# Patient Record
Sex: FEMALE | Race: WHITE | NOT HISPANIC OR LATINO | Employment: OTHER | URBAN - METROPOLITAN AREA
[De-identification: names, ages, dates, MRNs, and addresses within clinical notes are randomized per-mention and may not be internally consistent; named-entity substitution may affect disease eponyms.]

---

## 2023-02-20 LAB
INR IN PPP BY COAGULATION ASSAY: 2.5 (ref 0.9–1.1)
PROTHROMBIN TIME (PT) IN PPP BY COAGULATION ASSAY: 29.7 SEC (ref 9.8–13.4)

## 2023-03-10 LAB
ANION GAP IN SER/PLAS: 8 MMOL/L (ref 10–20)
CALCIUM (MG/DL) IN SER/PLAS: 9.4 MG/DL (ref 8.6–10.3)
CARBON DIOXIDE, TOTAL (MMOL/L) IN SER/PLAS: 29 MMOL/L (ref 21–32)
CHLORIDE (MMOL/L) IN SER/PLAS: 103 MMOL/L (ref 98–107)
CREATININE (MG/DL) IN SER/PLAS: 0.59 MG/DL (ref 0.5–1.05)
GFR FEMALE: >90 ML/MIN/1.73M2
GLUCOSE (MG/DL) IN SER/PLAS: 93 MG/DL (ref 74–99)
INR IN PPP BY COAGULATION ASSAY: 3.2 (ref 0.9–1.1)
POTASSIUM (MMOL/L) IN SER/PLAS: 4.8 MMOL/L (ref 3.5–5.3)
PROTHROMBIN TIME (PT) IN PPP BY COAGULATION ASSAY: 37.5 SEC (ref 9.8–13.4)
SODIUM (MMOL/L) IN SER/PLAS: 135 MMOL/L (ref 136–145)
UREA NITROGEN (MG/DL) IN SER/PLAS: 16 MG/DL (ref 6–23)

## 2023-03-24 LAB
INR IN PPP BY COAGULATION ASSAY: 2.6 (ref 0.9–1.1)
PROTHROMBIN TIME (PT) IN PPP BY COAGULATION ASSAY: 30.4 SEC (ref 9.8–13.4)

## 2023-04-11 RX ORDER — LORAZEPAM 1 MG/1
1 TABLET ORAL 2 TIMES DAILY PRN
COMMUNITY
Start: 2016-12-21 | End: 2023-04-12 | Stop reason: SDUPTHER

## 2023-04-11 RX ORDER — AMLODIPINE BESYLATE 5 MG/1
1 TABLET ORAL DAILY
COMMUNITY
Start: 2021-08-12 | End: 2024-01-29

## 2023-04-11 RX ORDER — CHOLECALCIFEROL (VITAMIN D3) 25 MCG
1 TABLET ORAL DAILY
COMMUNITY
Start: 2019-01-07

## 2023-04-11 RX ORDER — ROSUVASTATIN CALCIUM 40 MG/1
1 TABLET, COATED ORAL DAILY
COMMUNITY
Start: 2020-03-06 | End: 2024-03-05 | Stop reason: SDUPTHER

## 2023-04-11 RX ORDER — METOPROLOL TARTRATE 50 MG/1
50 TABLET ORAL 2 TIMES DAILY
COMMUNITY
End: 2024-03-05 | Stop reason: SDUPTHER

## 2023-04-11 RX ORDER — LISINOPRIL 20 MG/1
20 TABLET ORAL 2 TIMES DAILY
COMMUNITY
End: 2024-03-05 | Stop reason: SDUPTHER

## 2023-04-11 RX ORDER — WARFARIN SODIUM 5 MG/1
TABLET ORAL
COMMUNITY
Start: 2019-01-09 | End: 2024-01-10 | Stop reason: SDUPTHER

## 2023-04-12 ENCOUNTER — OFFICE VISIT (OUTPATIENT)
Dept: PRIMARY CARE | Facility: CLINIC | Age: 73
End: 2023-04-12
Payer: MEDICARE

## 2023-04-12 VITALS
HEIGHT: 59 IN | SYSTOLIC BLOOD PRESSURE: 128 MMHG | DIASTOLIC BLOOD PRESSURE: 66 MMHG | HEART RATE: 74 BPM | WEIGHT: 157.8 LBS | TEMPERATURE: 97.3 F | OXYGEN SATURATION: 96 % | BODY MASS INDEX: 31.81 KG/M2

## 2023-04-12 DIAGNOSIS — Z95.1 S/P CABG X 4: ICD-10-CM

## 2023-04-12 DIAGNOSIS — G47.33 OSA ON CPAP: ICD-10-CM

## 2023-04-12 DIAGNOSIS — F41.9 ANXIETY: Primary | ICD-10-CM

## 2023-04-12 DIAGNOSIS — I10 BENIGN ESSENTIAL HYPERTENSION: ICD-10-CM

## 2023-04-12 DIAGNOSIS — Z79.899 LONG-TERM CURRENT USE OF BENZODIAZEPINE: ICD-10-CM

## 2023-04-12 DIAGNOSIS — L98.8 SKIN MACULE: ICD-10-CM

## 2023-04-12 DIAGNOSIS — I50.32 CHRONIC DIASTOLIC CONGESTIVE HEART FAILURE (MULTI): ICD-10-CM

## 2023-04-12 PROBLEM — I25.10 ARTERIOSCLEROSIS OF CORONARY ARTERY IN PATIENT WITH HISTORY OF MYOCARDIAL INFARCTION: Status: ACTIVE | Noted: 2023-04-12

## 2023-04-12 PROBLEM — I36.1 NON-RHEUMATIC TRICUSPID VALVE INSUFFICIENCY: Status: ACTIVE | Noted: 2023-04-12

## 2023-04-12 PROBLEM — I48.0 PAROXYSMAL ATRIAL FIBRILLATION (MULTI): Status: ACTIVE | Noted: 2023-04-12

## 2023-04-12 PROBLEM — E78.5 HYPERLIPIDEMIA: Status: ACTIVE | Noted: 2023-04-12

## 2023-04-12 PROBLEM — R73.01 IFG (IMPAIRED FASTING GLUCOSE): Status: ACTIVE | Noted: 2023-04-12

## 2023-04-12 PROBLEM — M80.88XA OSTEOPOROTIC COMPRESSION FRACTURE OF SPINE (MULTI): Status: ACTIVE | Noted: 2023-04-12

## 2023-04-12 PROBLEM — I25.2 ARTERIOSCLEROSIS OF CORONARY ARTERY IN PATIENT WITH HISTORY OF MYOCARDIAL INFARCTION: Status: ACTIVE | Noted: 2023-04-12

## 2023-04-12 PROBLEM — I34.0 NONRHEUMATIC MITRAL (VALVE) INSUFFICIENCY: Status: ACTIVE | Noted: 2023-04-12

## 2023-04-12 LAB
POC AMPHETAMINE: NEGATIVE NG/ML
POC BARBITURATES: NEGATIVE NG/ML
POC BENZODIAZEPINES: POSITIVE NG/ML
POC BUPRENORPHINE URINE: NEGATIVE NG/ML
POC COCAINE: NEGATIVE NG/ML
POC MDMA (NG/ML) IN URINE: NEGATIVE NG/ML
POC METHADONE MANUALLY ENTERED: ABNORMAL NG/ML
POC METHAMPHETAMINE: NEGATIVE NG/ML
POC MORPHINE URINE: NEGATIVE NG/ML
POC OPIATES: NEGATIVE NG/ML
POC OXYCODONE: NEGATIVE NG/ML
POC PHENCYCLIDINE (PCP): NEGATIVE NG/ML
POC THC: NEGATIVE NG/ML
POC TICYCLIC ANTIDEPRESSANTS (TCA): ABNORMAL NG/ML

## 2023-04-12 PROCEDURE — 3074F SYST BP LT 130 MM HG: CPT | Performed by: INTERNAL MEDICINE

## 2023-04-12 PROCEDURE — 80305 DRUG TEST PRSMV DIR OPT OBS: CPT | Performed by: INTERNAL MEDICINE

## 2023-04-12 PROCEDURE — 3078F DIAST BP <80 MM HG: CPT | Performed by: INTERNAL MEDICINE

## 2023-04-12 PROCEDURE — 99214 OFFICE O/P EST MOD 30 MIN: CPT | Performed by: INTERNAL MEDICINE

## 2023-04-12 PROCEDURE — 1157F ADVNC CARE PLAN IN RCRD: CPT | Performed by: INTERNAL MEDICINE

## 2023-04-12 PROCEDURE — 1036F TOBACCO NON-USER: CPT | Performed by: INTERNAL MEDICINE

## 2023-04-12 RX ORDER — LORAZEPAM 1 MG/1
1 TABLET ORAL 2 TIMES DAILY PRN
Qty: 60 TABLET | Refills: 2 | Status: SHIPPED | OUTPATIENT
Start: 2023-04-12 | End: 2023-07-12 | Stop reason: SDUPTHER

## 2023-04-12 ASSESSMENT — ENCOUNTER SYMPTOMS
MUSCULOSKELETAL NEGATIVE: 1
PSYCHIATRIC NEGATIVE: 1
ENDOCRINE NEGATIVE: 1
CONSTITUTIONAL NEGATIVE: 1
EYES NEGATIVE: 1
ALLERGIC/IMMUNOLOGIC NEGATIVE: 1
CARDIOVASCULAR NEGATIVE: 1
HEMATOLOGIC/LYMPHATIC NEGATIVE: 1
GASTROINTESTINAL NEGATIVE: 1
RESPIRATORY NEGATIVE: 1
NEUROLOGICAL NEGATIVE: 1

## 2023-04-12 NOTE — PROGRESS NOTES
"Subjective   Patient ID: Sara Russell is a 72 y.o. female who presents for 3 month follow up .  Anxiety symptoms have been overall well controlled with current medication therapy.  No adverse effects of medication reported.  No new or associated issues reported.    She continues routine follow up with Cardiology for her cardiac issues.        Review of Systems   Constitutional: Negative.    HENT: Negative.     Eyes: Negative.    Respiratory: Negative.     Cardiovascular: Negative.    Gastrointestinal: Negative.    Endocrine: Negative.    Genitourinary: Negative.    Musculoskeletal: Negative.    Skin: Negative.    Allergic/Immunologic: Negative.    Neurological: Negative.    Hematological: Negative.    Psychiatric/Behavioral: Negative.         Objective     Blood pressure 128/66, pulse 74, temperature 36.3 °C (97.3 °F), temperature source Temporal, height 1.486 m (4' 10.5\"), weight 71.6 kg (157 lb 12.8 oz), SpO2 96 %.   Physical Exam  Constitutional:       Appearance: Normal appearance.   Neck:      Vascular: No carotid bruit.   Cardiovascular:      Rate and Rhythm: Normal rate and regular rhythm.      Pulses: Normal pulses.      Heart sounds: Normal heart sounds. No murmur heard.  Pulmonary:      Effort: Pulmonary effort is normal.      Breath sounds: Normal breath sounds. No wheezing or rales.   Abdominal:      General: Abdomen is flat. There is no distension.      Palpations: Abdomen is soft.      Tenderness: There is no abdominal tenderness.   Musculoskeletal:         General: Normal range of motion.      Cervical back: Normal range of motion and neck supple.   Skin:     General: Skin is warm and dry.   Neurological:      General: No focal deficit present.      Mental Status: She is alert and oriented to person, place, and time.   Psychiatric:         Mood and Affect: Mood normal.         Behavior: Behavior normal.         Assessment/Plan   Problem List Items Addressed This Visit          Nervous    APOLONIA on " CPAP       Circulatory    Benign essential hypertension    Chronic diastolic congestive heart failure (CMS/HCC)    Relevant Medications    amLODIPine (Norvasc) 5 mg tablet    metoprolol tartrate (Lopressor) 50 mg tablet    S/P CABG x 4       Other    Anxiety - Primary     Anxiety symptoms well controlled and doing well on current therapy.  Will continue present therapy and follow clinically.    CSA reviewed/renewed today.  UDS reviewed today with expected findings. She continues follow up with Cardiology.  She has a skin macule on the right flank and I recommend seeing Derm re:  this.  Pt. continues to use CPAP nightly and is tolerating well.  Advised to continue treatment and will continue to follow clinically.    Follow up in 3 months

## 2023-04-14 LAB
INR IN PPP BY COAGULATION ASSAY: 3 (ref 0.9–1.1)
PROTHROMBIN TIME (PT) IN PPP BY COAGULATION ASSAY: 35.6 SEC (ref 9.8–13.4)

## 2023-05-05 LAB
INR IN PPP BY COAGULATION ASSAY: 3.2 (ref 0.9–1.1)
PROTHROMBIN TIME (PT) IN PPP BY COAGULATION ASSAY: 37.5 SEC (ref 9.8–13.4)

## 2023-05-12 LAB
INR IN PPP BY COAGULATION ASSAY: 3.2 (ref 0.9–1.1)
PROTHROMBIN TIME (PT) IN PPP BY COAGULATION ASSAY: 37.8 SEC (ref 9.8–13.4)

## 2023-05-22 LAB
CHOLESTEROL (MG/DL) IN SER/PLAS: 142 MG/DL (ref 0–199)
CHOLESTEROL IN HDL (MG/DL) IN SER/PLAS: 54.2 MG/DL
CHOLESTEROL/HDL RATIO: 2.6
LDL: 72 MG/DL (ref 0–99)
TRIGLYCERIDE (MG/DL) IN SER/PLAS: 80 MG/DL (ref 0–149)
VLDL: 16 MG/DL (ref 0–40)

## 2023-06-02 LAB
INR IN PPP BY COAGULATION ASSAY: 2.7 (ref 0.9–1.1)
PROTHROMBIN TIME (PT) IN PPP BY COAGULATION ASSAY: 31 SEC (ref 9.8–13.4)

## 2023-06-23 LAB
INR IN PPP BY COAGULATION ASSAY: 2.2 (ref 0.9–1.1)
PROTHROMBIN TIME (PT) IN PPP BY COAGULATION ASSAY: 24.6 SEC (ref 9.8–12.8)

## 2023-07-07 LAB
INR IN PPP BY COAGULATION ASSAY: 2.1 (ref 0.9–1.1)
PROTHROMBIN TIME (PT) IN PPP BY COAGULATION ASSAY: 23.9 SEC (ref 9.8–12.8)

## 2023-07-12 ENCOUNTER — OFFICE VISIT (OUTPATIENT)
Dept: PRIMARY CARE | Facility: CLINIC | Age: 73
End: 2023-07-12
Payer: MEDICARE

## 2023-07-12 VITALS
HEART RATE: 79 BPM | WEIGHT: 157.4 LBS | OXYGEN SATURATION: 97 % | DIASTOLIC BLOOD PRESSURE: 70 MMHG | HEIGHT: 56 IN | TEMPERATURE: 97.2 F | SYSTOLIC BLOOD PRESSURE: 148 MMHG | BODY MASS INDEX: 35.41 KG/M2

## 2023-07-12 DIAGNOSIS — E78.00 PURE HYPERCHOLESTEROLEMIA: ICD-10-CM

## 2023-07-12 DIAGNOSIS — I25.2 ARTERIOSCLEROSIS OF CORONARY ARTERY IN PATIENT WITH HISTORY OF MYOCARDIAL INFARCTION: ICD-10-CM

## 2023-07-12 DIAGNOSIS — I25.10 ARTERIOSCLEROSIS OF CORONARY ARTERY IN PATIENT WITH HISTORY OF MYOCARDIAL INFARCTION: ICD-10-CM

## 2023-07-12 DIAGNOSIS — F41.9 ANXIETY: Primary | ICD-10-CM

## 2023-07-12 DIAGNOSIS — G47.33 OSA ON CPAP: ICD-10-CM

## 2023-07-12 DIAGNOSIS — I10 BENIGN ESSENTIAL HYPERTENSION: ICD-10-CM

## 2023-07-12 DIAGNOSIS — I48.0 PAROXYSMAL ATRIAL FIBRILLATION (MULTI): ICD-10-CM

## 2023-07-12 PROCEDURE — 1157F ADVNC CARE PLAN IN RCRD: CPT | Performed by: INTERNAL MEDICINE

## 2023-07-12 PROCEDURE — 3077F SYST BP >= 140 MM HG: CPT | Performed by: INTERNAL MEDICINE

## 2023-07-12 PROCEDURE — 99213 OFFICE O/P EST LOW 20 MIN: CPT | Performed by: INTERNAL MEDICINE

## 2023-07-12 PROCEDURE — 1036F TOBACCO NON-USER: CPT | Performed by: INTERNAL MEDICINE

## 2023-07-12 PROCEDURE — 3078F DIAST BP <80 MM HG: CPT | Performed by: INTERNAL MEDICINE

## 2023-07-12 RX ORDER — LORAZEPAM 1 MG/1
1 TABLET ORAL 2 TIMES DAILY PRN
Qty: 60 TABLET | Refills: 2 | Status: SHIPPED | OUTPATIENT
Start: 2023-07-12 | End: 2023-10-18 | Stop reason: SDUPTHER

## 2023-07-12 ASSESSMENT — ENCOUNTER SYMPTOMS
MUSCULOSKELETAL NEGATIVE: 1
HEMATOLOGIC/LYMPHATIC NEGATIVE: 1
GASTROINTESTINAL NEGATIVE: 1
ALLERGIC/IMMUNOLOGIC NEGATIVE: 1
ENDOCRINE NEGATIVE: 1
OCCASIONAL FEELINGS OF UNSTEADINESS: 0
LOSS OF SENSATION IN FEET: 0
NEUROLOGICAL NEGATIVE: 1
EYES NEGATIVE: 1
PSYCHIATRIC NEGATIVE: 1
CONSTITUTIONAL NEGATIVE: 1
DEPRESSION: 1
CARDIOVASCULAR NEGATIVE: 1
RESPIRATORY NEGATIVE: 1

## 2023-07-12 ASSESSMENT — ANXIETY QUESTIONNAIRES
6. BECOMING EASILY ANNOYED OR IRRITABLE: NOT AT ALL
2. NOT BEING ABLE TO STOP OR CONTROL WORRYING: SEVERAL DAYS
1. FEELING NERVOUS, ANXIOUS, OR ON EDGE: SEVERAL DAYS
4. TROUBLE RELAXING: NOT AT ALL
3. WORRYING TOO MUCH ABOUT DIFFERENT THINGS: SEVERAL DAYS
7. FEELING AFRAID AS IF SOMETHING AWFUL MIGHT HAPPEN: NOT AT ALL
5. BEING SO RESTLESS THAT IT IS HARD TO SIT STILL: NOT AT ALL
IF YOU CHECKED OFF ANY PROBLEMS ON THIS QUESTIONNAIRE, HOW DIFFICULT HAVE THESE PROBLEMS MADE IT FOR YOU TO DO YOUR WORK, TAKE CARE OF THINGS AT HOME, OR GET ALONG WITH OTHER PEOPLE: NOT DIFFICULT AT ALL
GAD7 TOTAL SCORE: 3

## 2023-07-12 NOTE — PROGRESS NOTES
"Subjective   Patient ID: Sara Russell is a 73 y.o. female who presents for 3 month follow up .  Patient presents today in follow up re:   anxiety.    Anxiety symptoms have been overall well controlled with current medication therapy.  No adverse effects of medication reported.  No new or associated issues reported.    She continues routine follow up with Cardiology for her cardiac issues.        Review of Systems   Constitutional: Negative.    HENT: Negative.     Eyes: Negative.    Respiratory: Negative.     Cardiovascular: Negative.    Gastrointestinal: Negative.    Endocrine: Negative.    Genitourinary: Negative.    Musculoskeletal: Negative.    Skin: Negative.    Allergic/Immunologic: Negative.    Neurological: Negative.    Hematological: Negative.    Psychiatric/Behavioral: Negative.         Objective     Blood pressure 148/70, pulse 79, temperature 36.2 °C (97.2 °F), temperature source Temporal, height 1.422 m (4' 8\"), weight 71.4 kg (157 lb 6.4 oz), SpO2 97 %.   Physical Exam  Constitutional:       Appearance: Normal appearance.   Neck:      Vascular: No carotid bruit.   Cardiovascular:      Rate and Rhythm: Normal rate and regular rhythm.      Pulses: Normal pulses.      Heart sounds: Normal heart sounds. No murmur heard.  Pulmonary:      Effort: Pulmonary effort is normal.      Breath sounds: Normal breath sounds. No wheezing or rales.   Abdominal:      General: Abdomen is flat. There is no distension.      Palpations: Abdomen is soft.      Tenderness: There is no abdominal tenderness.   Musculoskeletal:         General: Normal range of motion.      Cervical back: Normal range of motion and neck supple.   Skin:     General: Skin is warm and dry.   Neurological:      General: No focal deficit present.      Mental Status: She is alert and oriented to person, place, and time.   Psychiatric:         Mood and Affect: Mood normal.         Behavior: Behavior normal.         Assessment/Plan   Problem List " Items Addressed This Visit       Anxiety - Primary    Arteriosclerosis of coronary artery in patient with history of myocardial infarction    Benign essential hypertension    Hyperlipidemia    APOLONIA on CPAP    Paroxysmal atrial fibrillation (CMS/HCC)     Anxiety symptoms well controlled and doing well on current therapy.  Will continue present therapy and follow clinically.    CSA reviewed/renewed today.  She continues follow up with Cardiology re:  heart issues and anticoagulation.  Pt. continues to use CPAP nightly and is tolerating well.  Advised to continue treatment and will continue to follow clinically.    Follow up in 3 months

## 2023-07-21 LAB
INR IN PPP BY COAGULATION ASSAY: 2.4 (ref 0.9–1.1)
PROTHROMBIN TIME (PT) IN PPP BY COAGULATION ASSAY: 27.5 SEC (ref 9.8–12.8)

## 2023-08-11 LAB
INR IN PPP BY COAGULATION ASSAY: 2.4 (ref 0.9–1.1)
PROTHROMBIN TIME (PT) IN PPP BY COAGULATION ASSAY: 26.8 SEC (ref 9.8–12.8)

## 2023-09-06 LAB
INR IN PPP BY COAGULATION ASSAY: 2.1 (ref 0.9–1.1)
PROTHROMBIN TIME (PT) IN PPP BY COAGULATION ASSAY: 23.6 SEC (ref 9.8–12.8)

## 2023-10-06 ENCOUNTER — LAB (OUTPATIENT)
Dept: LAB | Facility: LAB | Age: 73
End: 2023-10-06
Payer: MEDICARE

## 2023-10-06 DIAGNOSIS — I48.0 PAROXYSMAL ATRIAL FIBRILLATION (MULTI): Primary | ICD-10-CM

## 2023-10-06 LAB
INR PPP: 2.3 (ref 0.9–1.1)
PROTHROMBIN TIME: 25.9 SECONDS (ref 9.8–12.8)

## 2023-10-06 PROCEDURE — 36415 COLL VENOUS BLD VENIPUNCTURE: CPT

## 2023-10-06 PROCEDURE — 85610 PROTHROMBIN TIME: CPT

## 2023-10-09 ENCOUNTER — ANTICOAGULATION - WARFARIN VISIT (OUTPATIENT)
Dept: CARDIOLOGY | Facility: CLINIC | Age: 73
End: 2023-10-09
Payer: MEDICARE

## 2023-10-09 PROBLEM — I48.91 ATRIAL FIBRILLATION (MULTI): Status: ACTIVE | Noted: 2023-04-12

## 2023-10-11 ENCOUNTER — APPOINTMENT (OUTPATIENT)
Dept: PRIMARY CARE | Facility: CLINIC | Age: 73
End: 2023-10-11
Payer: MEDICARE

## 2023-10-18 ENCOUNTER — OFFICE VISIT (OUTPATIENT)
Dept: PRIMARY CARE | Facility: CLINIC | Age: 73
End: 2023-10-18
Payer: MEDICARE

## 2023-10-18 VITALS
DIASTOLIC BLOOD PRESSURE: 60 MMHG | BODY MASS INDEX: 33.78 KG/M2 | TEMPERATURE: 98.6 F | HEART RATE: 79 BPM | HEIGHT: 57 IN | OXYGEN SATURATION: 90 % | SYSTOLIC BLOOD PRESSURE: 138 MMHG | WEIGHT: 156.6 LBS

## 2023-10-18 DIAGNOSIS — I25.10 ARTERIOSCLEROSIS OF CORONARY ARTERY IN PATIENT WITH HISTORY OF MYOCARDIAL INFARCTION: ICD-10-CM

## 2023-10-18 DIAGNOSIS — I10 BENIGN ESSENTIAL HYPERTENSION: ICD-10-CM

## 2023-10-18 DIAGNOSIS — G47.33 OSA ON CPAP: ICD-10-CM

## 2023-10-18 DIAGNOSIS — I50.32 CHRONIC DIASTOLIC CONGESTIVE HEART FAILURE (MULTI): ICD-10-CM

## 2023-10-18 DIAGNOSIS — E78.00 PURE HYPERCHOLESTEROLEMIA: ICD-10-CM

## 2023-10-18 DIAGNOSIS — M80.88XD OSTEOPOROTIC COMPRESSION FRACTURE OF VERTEBRA WITH ROUTINE HEALING, SUBSEQUENT ENCOUNTER: ICD-10-CM

## 2023-10-18 DIAGNOSIS — I48.0 PAROXYSMAL ATRIAL FIBRILLATION (MULTI): ICD-10-CM

## 2023-10-18 DIAGNOSIS — I25.2 ARTERIOSCLEROSIS OF CORONARY ARTERY IN PATIENT WITH HISTORY OF MYOCARDIAL INFARCTION: ICD-10-CM

## 2023-10-18 DIAGNOSIS — F41.9 ANXIETY: Primary | ICD-10-CM

## 2023-10-18 DIAGNOSIS — Z95.1 S/P CABG X 4: ICD-10-CM

## 2023-10-18 PROCEDURE — 1036F TOBACCO NON-USER: CPT | Performed by: INTERNAL MEDICINE

## 2023-10-18 PROCEDURE — 99213 OFFICE O/P EST LOW 20 MIN: CPT | Performed by: INTERNAL MEDICINE

## 2023-10-18 PROCEDURE — 1159F MED LIST DOCD IN RCRD: CPT | Performed by: INTERNAL MEDICINE

## 2023-10-18 PROCEDURE — 3078F DIAST BP <80 MM HG: CPT | Performed by: INTERNAL MEDICINE

## 2023-10-18 PROCEDURE — 3075F SYST BP GE 130 - 139MM HG: CPT | Performed by: INTERNAL MEDICINE

## 2023-10-18 RX ORDER — LORAZEPAM 1 MG/1
1 TABLET ORAL 2 TIMES DAILY PRN
Qty: 60 TABLET | Refills: 2 | Status: SHIPPED | OUTPATIENT
Start: 2023-10-18 | End: 2024-03-20 | Stop reason: SDUPTHER

## 2023-10-18 ASSESSMENT — ENCOUNTER SYMPTOMS
MUSCULOSKELETAL NEGATIVE: 1
HEMATOLOGIC/LYMPHATIC NEGATIVE: 1
CARDIOVASCULAR NEGATIVE: 1
GASTROINTESTINAL NEGATIVE: 1
NEUROLOGICAL NEGATIVE: 1
RESPIRATORY NEGATIVE: 1
CONSTITUTIONAL NEGATIVE: 1
ENDOCRINE NEGATIVE: 1
ALLERGIC/IMMUNOLOGIC NEGATIVE: 1
EYES NEGATIVE: 1
PSYCHIATRIC NEGATIVE: 1

## 2023-10-18 NOTE — PROGRESS NOTES
"Subjective   Patient ID: Sara Russell is a 73 y.o. female who presents for 3 month follow up .  Patient presents today in follow up re:   anxiety.    Anxiety symptoms have been overall well controlled with current medication therapy.  No adverse effects of medication reported.  No new or associated issues reported.    She continues routine follow up with Cardiology for all of her cardiac issues.        Review of Systems   Constitutional: Negative.    HENT: Negative.     Eyes: Negative.    Respiratory: Negative.     Cardiovascular: Negative.    Gastrointestinal: Negative.    Endocrine: Negative.    Genitourinary: Negative.    Musculoskeletal: Negative.    Skin: Negative.    Allergic/Immunologic: Negative.    Neurological: Negative.    Hematological: Negative.    Psychiatric/Behavioral: Negative.         Objective     Blood pressure 138/60, pulse 79, temperature 37 °C (98.6 °F), temperature source Temporal, height 1.448 m (4' 9\"), weight 71 kg (156 lb 9.6 oz), SpO2 90 %.   Physical Exam  Constitutional:       Appearance: Normal appearance.   Neck:      Vascular: No carotid bruit.   Cardiovascular:      Rate and Rhythm: Normal rate and regular rhythm.      Pulses: Normal pulses.      Heart sounds: Normal heart sounds. No murmur heard.  Pulmonary:      Effort: Pulmonary effort is normal.      Breath sounds: Normal breath sounds. No wheezing or rales.   Abdominal:      General: Abdomen is flat. There is no distension.      Palpations: Abdomen is soft.      Tenderness: There is no abdominal tenderness.   Musculoskeletal:         General: Normal range of motion.      Cervical back: Normal range of motion and neck supple.   Skin:     General: Skin is warm and dry.   Neurological:      General: No focal deficit present.      Mental Status: She is alert and oriented to person, place, and time.   Psychiatric:         Mood and Affect: Mood normal.         Behavior: Behavior normal.         Assessment/Plan   Problem List " Items Addressed This Visit       Anxiety - Primary    Relevant Medications    LORazepam (Ativan) 1 mg tablet    Arteriosclerosis of coronary artery in patient with history of myocardial infarction    Benign essential hypertension    Chronic diastolic congestive heart failure (CMS/HCC)    Hyperlipidemia    APOLONIA on CPAP    Osteoporotic compression fracture of spine (CMS/HCC)    Atrial fibrillation (CMS/HCC)    S/P CABG x 4     Anxiety symptoms well controlled and doing well on current therapy.  Will continue present therapy and follow clinically.    She continues follow with Cardiology re:  heart issues and anticoagulation.  Cardiology continues to manage all these issues and she is doing well.  Pt. continues to use CPAP nightly and is tolerating well.  Advised to continue treatment and will continue to follow clinically.    Follow up in 3 months

## 2023-11-06 ENCOUNTER — ANTICOAGULATION - WARFARIN VISIT (OUTPATIENT)
Dept: CARDIOLOGY | Facility: CLINIC | Age: 73
End: 2023-11-06

## 2023-11-06 ENCOUNTER — LAB (OUTPATIENT)
Dept: LAB | Facility: LAB | Age: 73
End: 2023-11-06
Payer: MEDICARE

## 2023-11-06 DIAGNOSIS — I48.0 PAROXYSMAL ATRIAL FIBRILLATION (MULTI): Primary | ICD-10-CM

## 2023-11-06 DIAGNOSIS — I48.91 ATRIAL FIBRILLATION, UNSPECIFIED TYPE (MULTI): ICD-10-CM

## 2023-11-06 LAB
INR PPP: 1.3 (ref 0.9–1.1)
PROTHROMBIN TIME: 14.2 SECONDS (ref 9.8–12.8)

## 2023-11-06 PROCEDURE — 36415 COLL VENOUS BLD VENIPUNCTURE: CPT

## 2023-11-06 PROCEDURE — 85610 PROTHROMBIN TIME: CPT

## 2023-11-13 ENCOUNTER — LAB (OUTPATIENT)
Dept: LAB | Facility: LAB | Age: 73
End: 2023-11-13
Payer: MEDICARE

## 2023-11-13 DIAGNOSIS — I48.0 PAROXYSMAL ATRIAL FIBRILLATION (MULTI): ICD-10-CM

## 2023-11-13 LAB
INR PPP: 2.1 (ref 0.9–1.1)
PROTHROMBIN TIME: 23.5 SECONDS (ref 9.8–12.8)

## 2023-11-13 PROCEDURE — 85610 PROTHROMBIN TIME: CPT

## 2023-11-13 PROCEDURE — 36415 COLL VENOUS BLD VENIPUNCTURE: CPT

## 2023-11-14 ENCOUNTER — ANTICOAGULATION - WARFARIN VISIT (OUTPATIENT)
Dept: CARDIOLOGY | Facility: CLINIC | Age: 73
End: 2023-11-14
Payer: MEDICARE

## 2023-11-14 DIAGNOSIS — I48.91 ATRIAL FIBRILLATION, UNSPECIFIED TYPE (MULTI): ICD-10-CM

## 2023-11-20 ENCOUNTER — LAB (OUTPATIENT)
Dept: LAB | Facility: LAB | Age: 73
End: 2023-11-20
Payer: MEDICARE

## 2023-11-20 ENCOUNTER — ANTICOAGULATION - WARFARIN VISIT (OUTPATIENT)
Dept: CARDIOLOGY | Facility: CLINIC | Age: 73
End: 2023-11-20

## 2023-11-20 DIAGNOSIS — I48.0 PAROXYSMAL ATRIAL FIBRILLATION (MULTI): ICD-10-CM

## 2023-11-20 DIAGNOSIS — I48.91 ATRIAL FIBRILLATION, UNSPECIFIED TYPE (MULTI): ICD-10-CM

## 2023-11-20 LAB
INR PPP: 2.5 (ref 0.9–1.1)
PROTHROMBIN TIME: 28.8 SECONDS (ref 9.8–12.8)

## 2023-11-20 PROCEDURE — 93793 ANTICOAG MGMT PT WARFARIN: CPT | Performed by: INTERNAL MEDICINE

## 2023-11-20 PROCEDURE — 36415 COLL VENOUS BLD VENIPUNCTURE: CPT

## 2023-11-20 PROCEDURE — 85610 PROTHROMBIN TIME: CPT

## 2023-11-21 NOTE — PROGRESS NOTES
Anticoagulation Episode Summary       Current INR goal:  2.0-3.0   TTR:  24.3 % (1.1 mo)   Next INR check:  11/27/2023   INR from last check:  2.5 (11/20/2023)   Weekly max warfarin dose:     Target end date:     INR check location:     Preferred lab:     Send INR reminders to:  DO [id number] CARD1 ACOAG    Indications    Atrial fibrillation (CMS/HCC) [I48.91]             Comments:               Anticoagulation Monitoring INR INR Date INR Goal Trend Last Week Total Next Week Total Sun Mon Tue 11/20/2023 2.5 11/20/2023 2.0-3.0 Same 32.5 mg 32.5 mg 5 mg 5 mg 5 mg   11/14/2023 2.1 11/13/2023 2.0-3.0 Same 32.5 mg 32.5 mg 5 mg - 5 mg   11/6/2023 1.3 11/6/2023 2.0-3.0 Up 30 mg 32.5 mg 5 mg 5 mg 5 mg   10/9/2023 2.3 10/6/2023 2.0-3.0 - 0 mg 30 mg 5 mg 2.5 mg 5 mg     Anticoagulation Monitoring Wed Thu Fri Sat   11/20/2023 5 mg 5 mg 2.5 mg 5 mg   11/14/2023 5 mg 5 mg 2.5 mg 5 mg   11/6/2023 5 mg 5 mg 2.5 mg 5 mg   10/9/2023 5 mg 5 mg 2.5 mg 5 mg         Phone communication conducted:     INR IN RANGE- Continue current dose of warfarin and repeat INR in 1w.

## 2023-11-27 ENCOUNTER — ANTICOAGULATION - WARFARIN VISIT (OUTPATIENT)
Dept: CARDIOLOGY | Facility: CLINIC | Age: 73
End: 2023-11-27

## 2023-11-27 ENCOUNTER — LAB (OUTPATIENT)
Dept: LAB | Facility: LAB | Age: 73
End: 2023-11-27
Payer: MEDICARE

## 2023-11-27 DIAGNOSIS — I48.0 PAROXYSMAL ATRIAL FIBRILLATION (MULTI): ICD-10-CM

## 2023-11-27 DIAGNOSIS — I48.91 ATRIAL FIBRILLATION, UNSPECIFIED TYPE (MULTI): ICD-10-CM

## 2023-11-27 LAB
INR IN PPP BY COAGULATION ASSAY EXTERNAL: 2.5 (ref 2–3)
INR PPP: 2.5 (ref 0.9–1.1)
PROTHROMBIN TIME (PT) IN PPP BY COAGULATION ASSAY EXTERNAL: NORMAL SECONDS
PROTHROMBIN TIME: 28 SECONDS (ref 9.8–12.8)

## 2023-11-27 PROCEDURE — 85610 PROTHROMBIN TIME: CPT

## 2023-11-27 PROCEDURE — 93793 ANTICOAG MGMT PT WARFARIN: CPT | Performed by: INTERNAL MEDICINE

## 2023-11-27 PROCEDURE — 36415 COLL VENOUS BLD VENIPUNCTURE: CPT

## 2023-11-27 NOTE — PROGRESS NOTES
Anticoagulation Episode Summary       Current INR goal:  2.0-3.0   TTR:  37.2 % (1.3 mo)   Next INR check:  12/18/2023   INR from last check:  2.50 (11/27/2023)   Weekly max warfarin dose:     Target end date:     INR check location:     Preferred lab:     Send INR reminders to:  DO [id number] CARD1 ACOAG    Indications    Atrial fibrillation (CMS/HCC) [I48.91]             Comments:               Anticoagulation Monitoring INR INR Date INR Goal Trend Last Week Total Next Week Total Sun Mon Tue 11/27/2023 2.50 11/27/2023 2.0-3.0 Same 32.5 mg 32.5 mg 5 mg 5 mg 5 mg   11/20/2023 2.5 11/20/2023 2.0-3.0 Same 32.5 mg 32.5 mg 5 mg 5 mg 5 mg   11/14/2023 2.1 11/13/2023 2.0-3.0 Same 32.5 mg 32.5 mg 5 mg - 5 mg   11/6/2023 1.3 11/6/2023 2.0-3.0 Up 30 mg 32.5 mg 5 mg 5 mg 5 mg   10/9/2023 2.3 10/6/2023 2.0-3.0 - 0 mg 30 mg 5 mg 2.5 mg 5 mg     Anticoagulation Monitoring Wed Thu Fri Sat   11/27/2023 5 mg 5 mg 2.5 mg 5 mg   11/20/2023 5 mg 5 mg 2.5 mg 5 mg   11/14/2023 5 mg 5 mg 2.5 mg 5 mg   11/6/2023 5 mg 5 mg 2.5 mg 5 mg   10/9/2023 5 mg 5 mg 2.5 mg 5 mg         Phone communication conducted: CWP SD    INR IN RANGE- Continue current dose of warfarin and repeat INR in 3w.

## 2023-12-20 ENCOUNTER — OFFICE VISIT (OUTPATIENT)
Dept: PRIMARY CARE | Facility: CLINIC | Age: 73
End: 2023-12-20
Payer: MEDICARE

## 2023-12-20 VITALS
HEART RATE: 85 BPM | WEIGHT: 156.6 LBS | OXYGEN SATURATION: 98 % | TEMPERATURE: 96.8 F | DIASTOLIC BLOOD PRESSURE: 70 MMHG | BODY MASS INDEX: 33.89 KG/M2 | SYSTOLIC BLOOD PRESSURE: 139 MMHG

## 2023-12-20 DIAGNOSIS — I48.91 ATRIAL FIBRILLATION, UNSPECIFIED TYPE (MULTI): ICD-10-CM

## 2023-12-20 DIAGNOSIS — F41.9 ANXIETY: Primary | ICD-10-CM

## 2023-12-20 DIAGNOSIS — G47.33 OSA ON CPAP: ICD-10-CM

## 2023-12-20 DIAGNOSIS — I10 BENIGN ESSENTIAL HYPERTENSION: ICD-10-CM

## 2023-12-20 PROCEDURE — 1159F MED LIST DOCD IN RCRD: CPT | Performed by: INTERNAL MEDICINE

## 2023-12-20 PROCEDURE — 1036F TOBACCO NON-USER: CPT | Performed by: INTERNAL MEDICINE

## 2023-12-20 PROCEDURE — 3078F DIAST BP <80 MM HG: CPT | Performed by: INTERNAL MEDICINE

## 2023-12-20 PROCEDURE — 3075F SYST BP GE 130 - 139MM HG: CPT | Performed by: INTERNAL MEDICINE

## 2023-12-20 PROCEDURE — 99213 OFFICE O/P EST LOW 20 MIN: CPT | Performed by: INTERNAL MEDICINE

## 2023-12-20 ASSESSMENT — ENCOUNTER SYMPTOMS
CONSTITUTIONAL NEGATIVE: 1
ENDOCRINE NEGATIVE: 1
HEMATOLOGIC/LYMPHATIC NEGATIVE: 1
MUSCULOSKELETAL NEGATIVE: 1
CARDIOVASCULAR NEGATIVE: 1
ALLERGIC/IMMUNOLOGIC NEGATIVE: 1
GASTROINTESTINAL NEGATIVE: 1
EYES NEGATIVE: 1
NEUROLOGICAL NEGATIVE: 1
PSYCHIATRIC NEGATIVE: 1
RESPIRATORY NEGATIVE: 1

## 2023-12-20 NOTE — PROGRESS NOTES
Subjective   Patient ID: Sara Russell is a 73 y.o. female who presents for 3 month follow up .  Patient presents today in follow up re:   anxiety.    Anxiety symptoms have been overall well controlled with current medication therapy.  No adverse effects of medication reported.  No new or associated issues reported.    She continues routine follow up with Cardiology for all of her cardiac issues.        Review of Systems   Constitutional: Negative.    HENT: Negative.     Eyes: Negative.    Respiratory: Negative.     Cardiovascular: Negative.    Gastrointestinal: Negative.    Endocrine: Negative.    Genitourinary: Negative.    Musculoskeletal: Negative.    Skin: Negative.    Allergic/Immunologic: Negative.    Neurological: Negative.    Hematological: Negative.    Psychiatric/Behavioral: Negative.         Objective     Blood pressure 139/70, pulse 85, temperature 36 °C (96.8 °F), temperature source Temporal, weight 71 kg (156 lb 9.6 oz), SpO2 98 %.   Physical Exam  Constitutional:       Appearance: Normal appearance.   Neck:      Vascular: No carotid bruit.   Cardiovascular:      Rate and Rhythm: Normal rate and regular rhythm.      Pulses: Normal pulses.      Heart sounds: Normal heart sounds. No murmur heard.  Pulmonary:      Effort: Pulmonary effort is normal.      Breath sounds: Normal breath sounds. No wheezing or rales.   Abdominal:      General: Abdomen is flat. There is no distension.      Palpations: Abdomen is soft.      Tenderness: There is no abdominal tenderness.   Musculoskeletal:         General: Normal range of motion.      Cervical back: Normal range of motion and neck supple.   Skin:     General: Skin is warm and dry.   Neurological:      General: No focal deficit present.      Mental Status: She is alert and oriented to person, place, and time.   Psychiatric:         Mood and Affect: Mood normal.         Behavior: Behavior normal.         Assessment/Plan   Problem List Items Addressed This  Visit       Anxiety - Primary    Benign essential hypertension    APOLONIA on CPAP    Atrial fibrillation (CMS/HCC)     Anxiety symptoms well controlled and doing well on current therapy.  Will continue present therapy and follow clinically.    She continues follow with Cardiology re:  heart issues and anticoagulation.  Cardiology continues to manage all these issues and she is doing well.  Pt. continues to use CPAP nightly and is tolerating well.  Advised to continue treatment and will continue to follow clinically.    Follow up in 3 months

## 2023-12-22 ENCOUNTER — LAB (OUTPATIENT)
Dept: LAB | Facility: LAB | Age: 73
End: 2023-12-22
Payer: MEDICARE

## 2023-12-22 DIAGNOSIS — I48.0 PAROXYSMAL ATRIAL FIBRILLATION (MULTI): ICD-10-CM

## 2023-12-22 LAB
INR PPP: 1.9 (ref 0.9–1.1)
PROTHROMBIN TIME: 22.1 SECONDS (ref 9.8–12.8)

## 2023-12-22 PROCEDURE — 85610 PROTHROMBIN TIME: CPT

## 2023-12-22 PROCEDURE — 36415 COLL VENOUS BLD VENIPUNCTURE: CPT

## 2023-12-27 ENCOUNTER — ANTICOAGULATION - WARFARIN VISIT (OUTPATIENT)
Dept: CARDIOLOGY | Facility: CLINIC | Age: 73
End: 2023-12-27
Payer: MEDICARE

## 2023-12-27 DIAGNOSIS — I48.91 ATRIAL FIBRILLATION, UNSPECIFIED TYPE (MULTI): ICD-10-CM

## 2023-12-27 PROCEDURE — 93793 ANTICOAG MGMT PT WARFARIN: CPT | Performed by: INTERNAL MEDICINE

## 2023-12-27 NOTE — PROGRESS NOTES
Anticoagulation Episode Summary       Current INR goal:  2.0-3.0   TTR:  55.4 % (2.1 mo)   Next INR check:  1/3/2024   INR from last check:  1.9 (12/22/2023)   Weekly max warfarin dose:     Target end date:     INR check location:     Preferred lab:     Send INR reminders to:  DO [id number] CARD1 ACOAG    Indications    Atrial fibrillation (CMS/HCC) [I48.91]             Comments:               Anticoagulation Monitoring INR INR Date INR Goal Trend Last Week Total Next Week Total Sun Mon Tue   12/27/2023 1.9 12/22/2023 2.0-3.0 Up 32.5 mg 35 mg 5 mg 5 mg 5 mg   11/27/2023 2.50 11/27/2023 2.0-3.0 Same 32.5 mg 32.5 mg 5 mg 5 mg 5 mg   11/20/2023 2.5 11/20/2023 2.0-3.0 Same 32.5 mg 32.5 mg 5 mg 5 mg 5 mg   11/14/2023 2.1 11/13/2023 2.0-3.0 Same 32.5 mg 32.5 mg 5 mg - 5 mg   11/6/2023 1.3 11/6/2023 2.0-3.0 Up 30 mg 32.5 mg 5 mg 5 mg 5 mg   10/9/2023 2.3 10/6/2023 2.0-3.0 - 0 mg 30 mg 5 mg 2.5 mg 5 mg     Anticoagulation Monitoring Wed Thu Fri Sat   12/27/2023 5 mg 5 mg 5 mg 5 mg   11/27/2023 5 mg 5 mg 2.5 mg 5 mg   11/20/2023 5 mg 5 mg 2.5 mg 5 mg   11/14/2023 5 mg 5 mg 2.5 mg 5 mg   11/6/2023 5 mg 5 mg 2.5 mg 5 mg   10/9/2023 5 mg 5 mg 2.5 mg 5 mg         Phone communication conducted: CWP SD    INR OUT OF RANGE- Change warfarin dose to 5mg daily and repeat INR in 1w week/s.

## 2024-01-04 ENCOUNTER — LAB (OUTPATIENT)
Dept: LAB | Facility: LAB | Age: 74
End: 2024-01-04
Payer: MEDICARE

## 2024-01-04 DIAGNOSIS — I48.0 PAROXYSMAL ATRIAL FIBRILLATION (MULTI): ICD-10-CM

## 2024-01-04 LAB
INR PPP: 3.4 (ref 0.9–1.1)
PROTHROMBIN TIME: 38.7 SECONDS (ref 9.8–12.8)

## 2024-01-04 PROCEDURE — 36415 COLL VENOUS BLD VENIPUNCTURE: CPT

## 2024-01-04 PROCEDURE — 85610 PROTHROMBIN TIME: CPT

## 2024-01-05 ENCOUNTER — ANTICOAGULATION - WARFARIN VISIT (OUTPATIENT)
Dept: CARDIOLOGY | Facility: CLINIC | Age: 74
End: 2024-01-05
Payer: MEDICARE

## 2024-01-05 DIAGNOSIS — I48.91 ATRIAL FIBRILLATION, UNSPECIFIED TYPE (MULTI): ICD-10-CM

## 2024-01-05 PROCEDURE — 93793 ANTICOAG MGMT PT WARFARIN: CPT | Performed by: INTERNAL MEDICINE

## 2024-01-05 NOTE — PROGRESS NOTES
Anticoagulation Episode Summary       Current INR goal:  2.0-3.0   TTR:  57.3 % (2.6 mo)   Next INR check:  1/3/2024   INR from last check:  3.4 (1/4/2024)   Weekly max warfarin dose:     Target end date:     INR check location:     Preferred lab:     Send INR reminders to:  DO [id number] CARD1 ACOAG    Indications    Atrial fibrillation (CMS/HCC) [I48.91]             Comments:               Anticoagulation Monitoring INR INR Date INR Goal Trend Last Week Total Next Week Total Sun Mon Tue 1/5/2024 3.4 1/4/2024 2.0-3.0 Down 35 mg 32.5 mg 5 mg 5 mg 5 mg   12/27/2023 1.9 12/22/2023 2.0-3.0 Up 32.5 mg 35 mg 5 mg 5 mg 5 mg   11/27/2023 2.50 11/27/2023 2.0-3.0 Same 32.5 mg 32.5 mg 5 mg 5 mg 5 mg   11/20/2023 2.5 11/20/2023 2.0-3.0 Same 32.5 mg 32.5 mg 5 mg 5 mg 5 mg   11/14/2023 2.1 11/13/2023 2.0-3.0 Same 32.5 mg 32.5 mg 5 mg - 5 mg   11/6/2023 1.3 11/6/2023 2.0-3.0 Up 30 mg 32.5 mg 5 mg 5 mg 5 mg   10/9/2023 2.3 10/6/2023 2.0-3.0 - 0 mg 30 mg 5 mg 2.5 mg 5 mg     Anticoagulation Monitoring Wed Thu Fri Sat   1/5/2024 5 mg 5 mg 2.5 mg 5 mg   12/27/2023 5 mg 5 mg 5 mg 5 mg   11/27/2023 5 mg 5 mg 2.5 mg 5 mg   11/20/2023 5 mg 5 mg 2.5 mg 5 mg   11/14/2023 5 mg 5 mg 2.5 mg 5 mg   11/6/2023 5 mg 5 mg 2.5 mg 5 mg   10/9/2023 5 mg 5 mg 2.5 mg 5 mg         Phone communication conducted: lvm sd    INR OUT OF RANGE- Change warfarin dose to 2.5mg f, 5mg others and repeat INR in 2w week/s.

## 2024-01-10 DIAGNOSIS — I48.91 ATRIAL FIBRILLATION, UNSPECIFIED TYPE (MULTI): Primary | ICD-10-CM

## 2024-01-10 RX ORDER — WARFARIN SODIUM 5 MG/1
5 TABLET ORAL EVERY EVENING
Qty: 90 TABLET | Refills: 0 | Status: SHIPPED | OUTPATIENT
Start: 2024-01-10 | End: 2024-04-04 | Stop reason: SDUPTHER

## 2024-01-17 ENCOUNTER — APPOINTMENT (OUTPATIENT)
Dept: PRIMARY CARE | Facility: CLINIC | Age: 74
End: 2024-01-17
Payer: MEDICARE

## 2024-01-18 ENCOUNTER — LAB (OUTPATIENT)
Dept: LAB | Facility: LAB | Age: 74
End: 2024-01-18
Payer: MEDICARE

## 2024-01-18 ENCOUNTER — ANTICOAGULATION - WARFARIN VISIT (OUTPATIENT)
Dept: CARDIOLOGY | Facility: CLINIC | Age: 74
End: 2024-01-18

## 2024-01-18 DIAGNOSIS — I48.91 ATRIAL FIBRILLATION, UNSPECIFIED TYPE (MULTI): ICD-10-CM

## 2024-01-18 DIAGNOSIS — I48.0 PAROXYSMAL ATRIAL FIBRILLATION (MULTI): ICD-10-CM

## 2024-01-18 LAB
INR PPP: 2.9 (ref 0.9–1.1)
PROTHROMBIN TIME: 33.5 SECONDS (ref 9.8–12.8)

## 2024-01-18 PROCEDURE — 85610 PROTHROMBIN TIME: CPT

## 2024-01-18 PROCEDURE — 36415 COLL VENOUS BLD VENIPUNCTURE: CPT

## 2024-01-18 PROCEDURE — 93793 ANTICOAG MGMT PT WARFARIN: CPT | Performed by: INTERNAL MEDICINE

## 2024-01-18 NOTE — PROGRESS NOTES
Anticoagulation Episode Summary       Current INR goal:  2.0-3.0   TTR:  51.6 % (3 mo)   Next INR check:  1/25/2024   INR from last check:  2.9 (1/18/2024)   Weekly max warfarin dose:     Target end date:     INR check location:     Preferred lab:     Send INR reminders to:  DO [id number] CARD1 ACOAG    Indications    Atrial fibrillation (CMS/HCC) [I48.91]             Comments:               Anticoagulation Monitoring INR INR Date INR Goal Trend Last Week Total Next Week Total Sun Mon Tue   1/18/2024 2.9 1/18/2024 2.0-3.0 Same 32.5 mg 32.5 mg 5 mg 5 mg 5 mg   1/5/2024 3.4 1/4/2024 2.0-3.0 Down 35 mg 32.5 mg 5 mg 5 mg 5 mg   12/27/2023 1.9 12/22/2023 2.0-3.0 Up 32.5 mg 35 mg 5 mg 5 mg 5 mg   11/27/2023 2.50 11/27/2023 2.0-3.0 Same 32.5 mg 32.5 mg 5 mg 5 mg 5 mg   11/20/2023 2.5 11/20/2023 2.0-3.0 Same 32.5 mg 32.5 mg 5 mg 5 mg 5 mg   11/14/2023 2.1 11/13/2023 2.0-3.0 Same 32.5 mg 32.5 mg 5 mg - 5 mg   11/6/2023 1.3 11/6/2023 2.0-3.0 Up 30 mg 32.5 mg 5 mg 5 mg 5 mg   10/9/2023 2.3 10/6/2023 2.0-3.0 - 0 mg 30 mg 5 mg 2.5 mg 5 mg     Anticoagulation Monitoring Wed Thu Fri Sat   1/18/2024 5 mg 5 mg 2.5 mg 5 mg   1/5/2024 5 mg 5 mg 2.5 mg 5 mg   12/27/2023 5 mg 5 mg 5 mg 5 mg   11/27/2023 5 mg 5 mg 2.5 mg 5 mg   11/20/2023 5 mg 5 mg 2.5 mg 5 mg   11/14/2023 5 mg 5 mg 2.5 mg 5 mg   11/6/2023 5 mg 5 mg 2.5 mg 5 mg   10/9/2023 5 mg 5 mg 2.5 mg 5 mg         Phone communication conducted: cwp sd    INR IN RANGE- Continue current dose of warfarin and repeat INR in 1w.

## 2024-01-26 ENCOUNTER — LAB (OUTPATIENT)
Dept: LAB | Facility: LAB | Age: 74
End: 2024-01-26
Payer: MEDICARE

## 2024-01-26 DIAGNOSIS — I48.0 PAROXYSMAL ATRIAL FIBRILLATION (MULTI): ICD-10-CM

## 2024-01-26 LAB
INR PPP: 3.2 (ref 0.9–1.1)
PROTHROMBIN TIME: 36.3 SECONDS (ref 9.8–12.8)

## 2024-01-26 PROCEDURE — 85610 PROTHROMBIN TIME: CPT

## 2024-01-26 PROCEDURE — 36415 COLL VENOUS BLD VENIPUNCTURE: CPT

## 2024-01-29 ENCOUNTER — ANTICOAGULATION - WARFARIN VISIT (OUTPATIENT)
Dept: CARDIOLOGY | Facility: CLINIC | Age: 74
End: 2024-01-29
Payer: MEDICARE

## 2024-01-29 DIAGNOSIS — I10 BENIGN ESSENTIAL HYPERTENSION: ICD-10-CM

## 2024-01-29 DIAGNOSIS — E78.5 HYPERLIPIDEMIA, UNSPECIFIED HYPERLIPIDEMIA TYPE: ICD-10-CM

## 2024-01-29 DIAGNOSIS — I10 BENIGN ESSENTIAL HYPERTENSION: Primary | ICD-10-CM

## 2024-01-29 DIAGNOSIS — I48.91 ATRIAL FIBRILLATION, UNSPECIFIED TYPE (MULTI): ICD-10-CM

## 2024-01-29 DIAGNOSIS — E78.5 HYPERLIPIDEMIA, UNSPECIFIED HYPERLIPIDEMIA TYPE: Primary | ICD-10-CM

## 2024-01-29 PROCEDURE — 93793 ANTICOAG MGMT PT WARFARIN: CPT | Performed by: INTERNAL MEDICINE

## 2024-01-29 RX ORDER — METOPROLOL TARTRATE 100 MG/1
TABLET ORAL 2 TIMES DAILY
Qty: 90 TABLET | Refills: 3 | Status: SHIPPED | OUTPATIENT
Start: 2024-01-29 | End: 2024-03-05 | Stop reason: WASHOUT

## 2024-01-29 RX ORDER — AMLODIPINE BESYLATE 5 MG/1
5 TABLET ORAL DAILY
Qty: 90 TABLET | Refills: 3 | Status: SHIPPED | OUTPATIENT
Start: 2024-01-29 | End: 2024-03-05 | Stop reason: SDUPTHER

## 2024-01-29 NOTE — PROGRESS NOTES
Anticoagulation Episode Summary       Current INR goal:  2.0-3.0   TTR:  50.2 % (3.3 mo)   Next INR check:  2/5/2024   INR from last check:  3.2 (1/26/2024)   Weekly max warfarin dose:     Target end date:     INR check location:     Preferred lab:     Send INR reminders to:  DO [id number] CARD1 ACOAG    Indications    Atrial fibrillation (CMS/HCC) [I48.91]             Comments:               Anticoagulation Monitoring INR INR Date INR Goal Trend Last Week Total Next Week Total Sun Mon Tue   1/29/2024 3.2 1/26/2024 2.0-3.0 Down 32.5 mg 30 mg 5 mg 2.5 mg 5 mg   1/18/2024 2.9 1/18/2024 2.0-3.0 Same 32.5 mg 32.5 mg 5 mg 5 mg 5 mg   1/5/2024 3.4 1/4/2024 2.0-3.0 Down 35 mg 32.5 mg 5 mg 5 mg 5 mg   12/27/2023 1.9 12/22/2023 2.0-3.0 Up 32.5 mg 35 mg 5 mg 5 mg 5 mg   11/27/2023 2.50 11/27/2023 2.0-3.0 Same 32.5 mg 32.5 mg 5 mg 5 mg 5 mg   11/20/2023 2.5 11/20/2023 2.0-3.0 Same 32.5 mg 32.5 mg 5 mg 5 mg 5 mg   11/14/2023 2.1 11/13/2023 2.0-3.0 Same 32.5 mg 32.5 mg 5 mg - 5 mg   11/6/2023 1.3 11/6/2023 2.0-3.0 Up 30 mg 32.5 mg 5 mg 5 mg 5 mg   10/9/2023 2.3 10/6/2023 2.0-3.0 - 0 mg 30 mg 5 mg 2.5 mg 5 mg     Anticoagulation Monitoring Wed Thu Fri Sat   1/29/2024 5 mg 5 mg 2.5 mg 5 mg   1/18/2024 5 mg 5 mg 2.5 mg 5 mg   1/5/2024 5 mg 5 mg 2.5 mg 5 mg   12/27/2023 5 mg 5 mg 5 mg 5 mg   11/27/2023 5 mg 5 mg 2.5 mg 5 mg   11/20/2023 5 mg 5 mg 2.5 mg 5 mg   11/14/2023 5 mg 5 mg 2.5 mg 5 mg   11/6/2023 5 mg 5 mg 2.5 mg 5 mg   10/9/2023 5 mg 5 mg 2.5 mg 5 mg         Phone communication conducted: cwp sd    INR OUT OF RANGE- Change warfarin dose to 2.5mg m&f, 5mg others and repeat INR in 1 week/s.

## 2024-01-29 NOTE — TELEPHONE ENCOUNTER
1/29/24  1601  Labs ordered.  Called patient and informed her to have fasting labs done prior to her appt with Dr. Stoddard; verified that appt was with Dr. Stoddard.    Patient verbalized understanding.

## 2024-02-06 ENCOUNTER — LAB (OUTPATIENT)
Dept: LAB | Facility: LAB | Age: 74
End: 2024-02-06
Payer: MEDICARE

## 2024-02-06 DIAGNOSIS — I48.0 PAROXYSMAL ATRIAL FIBRILLATION (MULTI): ICD-10-CM

## 2024-02-06 LAB
INR PPP: 3.2 (ref 0.9–1.1)
PROTHROMBIN TIME: 36.3 SECONDS (ref 9.8–12.8)

## 2024-02-06 PROCEDURE — 36415 COLL VENOUS BLD VENIPUNCTURE: CPT

## 2024-02-06 PROCEDURE — 85610 PROTHROMBIN TIME: CPT

## 2024-02-07 ENCOUNTER — ANTICOAGULATION - WARFARIN VISIT (OUTPATIENT)
Dept: CARDIOLOGY | Facility: CLINIC | Age: 74
End: 2024-02-07
Payer: MEDICARE

## 2024-02-07 DIAGNOSIS — I48.91 ATRIAL FIBRILLATION, UNSPECIFIED TYPE (MULTI): ICD-10-CM

## 2024-02-07 PROCEDURE — 93793 ANTICOAG MGMT PT WARFARIN: CPT | Performed by: INTERNAL MEDICINE

## 2024-02-07 NOTE — PROGRESS NOTES
Anticoagulation Episode Summary       Current INR goal:  2.0-3.0   TTR:  45.2 % (3.7 mo)   Next INR check:  2/13/2024   INR from last check:  3.2 (2/6/2024)   Weekly max warfarin dose:     Target end date:     INR check location:     Preferred lab:     Send INR reminders to:  DO [id number] CARD1 ACOAG    Indications    Atrial fibrillation (CMS/HCC) [I48.91]             Comments:               Anticoagulation Monitoring INR INR Date INR Goal Trend Last Week Total Next Week Total Sun Mon Tue   2/7/2024 3.2 2/6/2024 2.0-3.0 Down 30 mg 27.5 mg 5 mg 2.5 mg -   1/29/2024 3.2 1/26/2024 2.0-3.0 Down 32.5 mg 30 mg 5 mg 2.5 mg 5 mg   1/18/2024 2.9 1/18/2024 2.0-3.0 Same 32.5 mg 32.5 mg 5 mg 5 mg 5 mg   1/5/2024 3.4 1/4/2024 2.0-3.0 Down 35 mg 32.5 mg 5 mg 5 mg 5 mg   12/27/2023 1.9 12/22/2023 2.0-3.0 Up 32.5 mg 35 mg 5 mg 5 mg 5 mg   11/27/2023 2.50 11/27/2023 2.0-3.0 Same 32.5 mg 32.5 mg 5 mg 5 mg 5 mg   11/20/2023 2.5 11/20/2023 2.0-3.0 Same 32.5 mg 32.5 mg 5 mg 5 mg 5 mg   11/14/2023 2.1 11/13/2023 2.0-3.0 Same 32.5 mg 32.5 mg 5 mg - 5 mg   11/6/2023 1.3 11/6/2023 2.0-3.0 Up 30 mg 32.5 mg 5 mg 5 mg 5 mg   10/9/2023 2.3 10/6/2023 2.0-3.0 - 0 mg 30 mg 5 mg 2.5 mg 5 mg     Anticoagulation Monitoring Wed Thu Fri Sat   2/7/2024 2.5 mg 5 mg 2.5 mg 5 mg   1/29/2024 5 mg 5 mg 2.5 mg 5 mg   1/18/2024 5 mg 5 mg 2.5 mg 5 mg   1/5/2024 5 mg 5 mg 2.5 mg 5 mg   12/27/2023 5 mg 5 mg 5 mg 5 mg   11/27/2023 5 mg 5 mg 2.5 mg 5 mg   11/20/2023 5 mg 5 mg 2.5 mg 5 mg   11/14/2023 5 mg 5 mg 2.5 mg 5 mg   11/6/2023 5 mg 5 mg 2.5 mg 5 mg   10/9/2023 5 mg 5 mg 2.5 mg 5 mg         Phone communication conducted: lvm sb    INR OUT OF RANGE- Change warfarin dose to 2.5mg m,w,f, 5mg others and repeat INR in 1w week/s.

## 2024-02-13 ENCOUNTER — LAB (OUTPATIENT)
Dept: LAB | Facility: LAB | Age: 74
End: 2024-02-13
Payer: MEDICARE

## 2024-02-13 ENCOUNTER — ANTICOAGULATION - WARFARIN VISIT (OUTPATIENT)
Dept: CARDIOLOGY | Facility: CLINIC | Age: 74
End: 2024-02-13

## 2024-02-13 DIAGNOSIS — I48.91 ATRIAL FIBRILLATION, UNSPECIFIED TYPE (MULTI): ICD-10-CM

## 2024-02-13 DIAGNOSIS — I48.0 PAROXYSMAL ATRIAL FIBRILLATION (MULTI): ICD-10-CM

## 2024-02-13 LAB
INR PPP: 2.5 (ref 0.9–1.1)
PROTHROMBIN TIME: 28.6 SECONDS (ref 9.8–12.8)

## 2024-02-13 PROCEDURE — 93793 ANTICOAG MGMT PT WARFARIN: CPT | Performed by: INTERNAL MEDICINE

## 2024-02-13 PROCEDURE — 36415 COLL VENOUS BLD VENIPUNCTURE: CPT

## 2024-02-13 PROCEDURE — 85610 PROTHROMBIN TIME: CPT

## 2024-02-13 NOTE — PROGRESS NOTES
Anticoagulation Episode Summary       Current INR goal:  2.0-3.0   TTR:  46.8 % (3.9 mo)   Next INR check:  2/20/2024   INR from last check:  2.5 (2/13/2024)   Weekly max warfarin dose:     Target end date:     INR check location:     Preferred lab:     Send INR reminders to:  DO [id number] CARD1 ACOAG    Indications    Atrial fibrillation (CMS/HCC) [I48.91]             Comments:               Anticoagulation Monitoring INR INR Date INR Goal Trend Last Week Total Next Week Total Sun Mon Tue 2/13/2024 2.5 2/13/2024 2.0-3.0 Same 27.5 mg 27.5 mg 5 mg 2.5 mg 5 mg   2/7/2024 3.2 2/6/2024 2.0-3.0 Down 30 mg 27.5 mg 5 mg 2.5 mg -   1/29/2024 3.2 1/26/2024 2.0-3.0 Down 32.5 mg 30 mg 5 mg 2.5 mg 5 mg   1/18/2024 2.9 1/18/2024 2.0-3.0 Same 32.5 mg 32.5 mg 5 mg 5 mg 5 mg   1/5/2024 3.4 1/4/2024 2.0-3.0 Down 35 mg 32.5 mg 5 mg 5 mg 5 mg   12/27/2023 1.9 12/22/2023 2.0-3.0 Up 32.5 mg 35 mg 5 mg 5 mg 5 mg   11/27/2023 2.50 11/27/2023 2.0-3.0 Same 32.5 mg 32.5 mg 5 mg 5 mg 5 mg   11/20/2023 2.5 11/20/2023 2.0-3.0 Same 32.5 mg 32.5 mg 5 mg 5 mg 5 mg   11/14/2023 2.1 11/13/2023 2.0-3.0 Same 32.5 mg 32.5 mg 5 mg - 5 mg   11/6/2023 1.3 11/6/2023 2.0-3.0 Up 30 mg 32.5 mg 5 mg 5 mg 5 mg   10/9/2023 2.3 10/6/2023 2.0-3.0 - 0 mg 30 mg 5 mg 2.5 mg 5 mg     Anticoagulation Monitoring Wed Thu Fri Sat   2/13/2024 2.5 mg 5 mg 2.5 mg 5 mg   2/7/2024 2.5 mg 5 mg 2.5 mg 5 mg   1/29/2024 5 mg 5 mg 2.5 mg 5 mg   1/18/2024 5 mg 5 mg 2.5 mg 5 mg   1/5/2024 5 mg 5 mg 2.5 mg 5 mg   12/27/2023 5 mg 5 mg 5 mg 5 mg   11/27/2023 5 mg 5 mg 2.5 mg 5 mg   11/20/2023 5 mg 5 mg 2.5 mg 5 mg   11/14/2023 5 mg 5 mg 2.5 mg 5 mg   11/6/2023 5 mg 5 mg 2.5 mg 5 mg   10/9/2023 5 mg 5 mg 2.5 mg 5 mg         Phone communication conducted: cwp sd    INR IN RANGE- Continue current dose of warfarin and repeat INR in 1w.       DISPLAY PLAN FREE TEXT

## 2024-02-20 ENCOUNTER — LAB (OUTPATIENT)
Dept: LAB | Facility: LAB | Age: 74
End: 2024-02-20
Payer: MEDICARE

## 2024-02-20 ENCOUNTER — ANTICOAGULATION - WARFARIN VISIT (OUTPATIENT)
Dept: CARDIOLOGY | Facility: CLINIC | Age: 74
End: 2024-02-20

## 2024-02-20 DIAGNOSIS — I48.0 PAROXYSMAL ATRIAL FIBRILLATION (MULTI): ICD-10-CM

## 2024-02-20 DIAGNOSIS — I48.91 ATRIAL FIBRILLATION, UNSPECIFIED TYPE (MULTI): ICD-10-CM

## 2024-02-20 LAB
INR PPP: 2.3 (ref 0.9–1.1)
PROTHROMBIN TIME: 25.6 SECONDS (ref 9.8–12.8)

## 2024-02-20 PROCEDURE — 85610 PROTHROMBIN TIME: CPT

## 2024-02-20 PROCEDURE — 93793 ANTICOAG MGMT PT WARFARIN: CPT | Performed by: INTERNAL MEDICINE

## 2024-02-20 PROCEDURE — 36415 COLL VENOUS BLD VENIPUNCTURE: CPT

## 2024-02-20 NOTE — PROGRESS NOTES
Anticoagulation Episode Summary       Current INR goal:  2.0-3.0   TTR:  49.8 % (4.1 mo)   Next INR check:  2/27/2024   INR from last check:  2.3 (2/20/2024)   Weekly max warfarin dose:     Target end date:     INR check location:     Preferred lab:     Send INR reminders to:  DO [id number] CARD1 ACOAG    Indications    Atrial fibrillation (CMS/HCC) [I48.91]             Comments:               Anticoagulation Monitoring INR INR Date INR Goal Trend Last Week Total Next Week Total Sun Mon Tue 2/20/2024 2.3 2/20/2024 2.0-3.0 Same 27.5 mg 27.5 mg 5 mg 2.5 mg 5 mg   2/13/2024 2.5 2/13/2024 2.0-3.0 Same 27.5 mg 27.5 mg 5 mg 2.5 mg 5 mg   2/7/2024 3.2 2/6/2024 2.0-3.0 Down 30 mg 27.5 mg 5 mg 2.5 mg -   1/29/2024 3.2 1/26/2024 2.0-3.0 Down 32.5 mg 30 mg 5 mg 2.5 mg 5 mg   1/18/2024 2.9 1/18/2024 2.0-3.0 Same 32.5 mg 32.5 mg 5 mg 5 mg 5 mg   1/5/2024 3.4 1/4/2024 2.0-3.0 Down 35 mg 32.5 mg 5 mg 5 mg 5 mg   12/27/2023 1.9 12/22/2023 2.0-3.0 Up 32.5 mg 35 mg 5 mg 5 mg 5 mg   11/27/2023 2.50 11/27/2023 2.0-3.0 Same 32.5 mg 32.5 mg 5 mg 5 mg 5 mg   11/20/2023 2.5 11/20/2023 2.0-3.0 Same 32.5 mg 32.5 mg 5 mg 5 mg 5 mg   11/14/2023 2.1 11/13/2023 2.0-3.0 Same 32.5 mg 32.5 mg 5 mg - 5 mg   11/6/2023 1.3 11/6/2023 2.0-3.0 Up 30 mg 32.5 mg 5 mg 5 mg 5 mg   10/9/2023 2.3 10/6/2023 2.0-3.0 - 0 mg 30 mg 5 mg 2.5 mg 5 mg     Anticoagulation Monitoring Wed Thu Fri Sat   2/20/2024 2.5 mg 5 mg 2.5 mg 5 mg   2/13/2024 2.5 mg 5 mg 2.5 mg 5 mg   2/7/2024 2.5 mg 5 mg 2.5 mg 5 mg   1/29/2024 5 mg 5 mg 2.5 mg 5 mg   1/18/2024 5 mg 5 mg 2.5 mg 5 mg   1/5/2024 5 mg 5 mg 2.5 mg 5 mg   12/27/2023 5 mg 5 mg 5 mg 5 mg   11/27/2023 5 mg 5 mg 2.5 mg 5 mg   11/20/2023 5 mg 5 mg 2.5 mg 5 mg   11/14/2023 5 mg 5 mg 2.5 mg 5 mg   11/6/2023 5 mg 5 mg 2.5 mg 5 mg   10/9/2023 5 mg 5 mg 2.5 mg 5 mg         Phone communication conducted: CWP SD    INR IN RANGE- Continue current dose of warfarin and repeat INR in 1W.

## 2024-02-27 ENCOUNTER — LAB (OUTPATIENT)
Dept: LAB | Facility: LAB | Age: 74
End: 2024-02-27
Payer: MEDICARE

## 2024-02-27 ENCOUNTER — ANTICOAGULATION - WARFARIN VISIT (OUTPATIENT)
Dept: CARDIOLOGY | Facility: CLINIC | Age: 74
End: 2024-02-27

## 2024-02-27 DIAGNOSIS — I48.91 ATRIAL FIBRILLATION, UNSPECIFIED TYPE (MULTI): ICD-10-CM

## 2024-02-27 DIAGNOSIS — E78.5 HYPERLIPIDEMIA, UNSPECIFIED HYPERLIPIDEMIA TYPE: ICD-10-CM

## 2024-02-27 DIAGNOSIS — I10 BENIGN ESSENTIAL HYPERTENSION: ICD-10-CM

## 2024-02-27 DIAGNOSIS — I48.0 PAROXYSMAL ATRIAL FIBRILLATION (MULTI): ICD-10-CM

## 2024-02-27 LAB
ALBUMIN SERPL BCP-MCNC: 3.9 G/DL (ref 3.4–5)
ALP SERPL-CCNC: 49 U/L (ref 33–136)
ALT SERPL W P-5'-P-CCNC: 21 U/L (ref 7–45)
ANION GAP SERPL CALC-SCNC: 8 MMOL/L (ref 10–20)
AST SERPL W P-5'-P-CCNC: 29 U/L (ref 9–39)
BILIRUB SERPL-MCNC: 0.9 MG/DL (ref 0–1.2)
BUN SERPL-MCNC: 13 MG/DL (ref 6–23)
CALCIUM SERPL-MCNC: 9.4 MG/DL (ref 8.6–10.3)
CHLORIDE SERPL-SCNC: 103 MMOL/L (ref 98–107)
CHOLEST SERPL-MCNC: 142 MG/DL (ref 0–199)
CHOLESTEROL/HDL RATIO: 2.8
CO2 SERPL-SCNC: 31 MMOL/L (ref 21–32)
CREAT SERPL-MCNC: 0.58 MG/DL (ref 0.5–1.05)
EGFRCR SERPLBLD CKD-EPI 2021: >90 ML/MIN/1.73M*2
GLUCOSE SERPL-MCNC: 92 MG/DL (ref 74–99)
HDLC SERPL-MCNC: 51.3 MG/DL
INR PPP: 2.5 (ref 0.9–1.1)
LDLC SERPL CALC-MCNC: 71 MG/DL
NON HDL CHOLESTEROL: 91 MG/DL (ref 0–149)
POTASSIUM SERPL-SCNC: 4.5 MMOL/L (ref 3.5–5.3)
PROT SERPL-MCNC: 7 G/DL (ref 6.4–8.2)
PROTHROMBIN TIME: 27.9 SECONDS (ref 9.8–12.8)
SODIUM SERPL-SCNC: 137 MMOL/L (ref 136–145)
TRIGL SERPL-MCNC: 99 MG/DL (ref 0–149)
VLDL: 20 MG/DL (ref 0–40)

## 2024-02-27 PROCEDURE — 93793 ANTICOAG MGMT PT WARFARIN: CPT | Performed by: INTERNAL MEDICINE

## 2024-02-27 PROCEDURE — 36415 COLL VENOUS BLD VENIPUNCTURE: CPT

## 2024-02-27 PROCEDURE — 80061 LIPID PANEL: CPT

## 2024-02-27 PROCEDURE — 85610 PROTHROMBIN TIME: CPT

## 2024-02-27 PROCEDURE — 80053 COMPREHEN METABOLIC PANEL: CPT

## 2024-02-27 NOTE — PROGRESS NOTES
Anticoagulation Episode Summary       Current INR goal:  2.0-3.0   TTR:  52.4 % (4.4 mo)   Next INR check:  3/5/2024   INR from last check:  2.5 (2/27/2024)   Weekly max warfarin dose:     Target end date:     INR check location:     Preferred lab:     Send INR reminders to:  DO [id number] CARD1 ACOAG    Indications    Atrial fibrillation (CMS/HCC) [I48.91]             Comments:               Anticoagulation Monitoring INR INR Date INR Goal Trend Last Week Total Next Week Total Sun Mon Tue 2/27/2024 2.5 2/27/2024 2.0-3.0 Same 27.5 mg 27.5 mg 5 mg 2.5 mg 5 mg   2/20/2024 2.3 2/20/2024 2.0-3.0 Same 27.5 mg 27.5 mg 5 mg 2.5 mg 5 mg   2/13/2024 2.5 2/13/2024 2.0-3.0 Same 27.5 mg 27.5 mg 5 mg 2.5 mg 5 mg   2/7/2024 3.2 2/6/2024 2.0-3.0 Down 30 mg 27.5 mg 5 mg 2.5 mg -   1/29/2024 3.2 1/26/2024 2.0-3.0 Down 32.5 mg 30 mg 5 mg 2.5 mg 5 mg   1/18/2024 2.9 1/18/2024 2.0-3.0 Same 32.5 mg 32.5 mg 5 mg 5 mg 5 mg   1/5/2024 3.4 1/4/2024 2.0-3.0 Down 35 mg 32.5 mg 5 mg 5 mg 5 mg   12/27/2023 1.9 12/22/2023 2.0-3.0 Up 32.5 mg 35 mg 5 mg 5 mg 5 mg   11/27/2023 2.50 11/27/2023 2.0-3.0 Same 32.5 mg 32.5 mg 5 mg 5 mg 5 mg   11/20/2023 2.5 11/20/2023 2.0-3.0 Same 32.5 mg 32.5 mg 5 mg 5 mg 5 mg   11/14/2023 2.1 11/13/2023 2.0-3.0 Same 32.5 mg 32.5 mg 5 mg - 5 mg   11/6/2023 1.3 11/6/2023 2.0-3.0 Up 30 mg 32.5 mg 5 mg 5 mg 5 mg   10/9/2023 2.3 10/6/2023 2.0-3.0 - 0 mg 30 mg 5 mg 2.5 mg 5 mg     Anticoagulation Monitoring Wed Thu Fri Sat   2/27/2024 2.5 mg 5 mg 2.5 mg 5 mg   2/20/2024 2.5 mg 5 mg 2.5 mg 5 mg   2/13/2024 2.5 mg 5 mg 2.5 mg 5 mg   2/7/2024 2.5 mg 5 mg 2.5 mg 5 mg   1/29/2024 5 mg 5 mg 2.5 mg 5 mg   1/18/2024 5 mg 5 mg 2.5 mg 5 mg   1/5/2024 5 mg 5 mg 2.5 mg 5 mg   12/27/2023 5 mg 5 mg 5 mg 5 mg   11/27/2023 5 mg 5 mg 2.5 mg 5 mg   11/20/2023 5 mg 5 mg 2.5 mg 5 mg   11/14/2023 5 mg 5 mg 2.5 mg 5 mg   11/6/2023 5 mg 5 mg 2.5 mg 5 mg   10/9/2023 5 mg 5 mg 2.5 mg 5 mg         Phone communication conducted: LVM SD    INR IN  RANGE- Continue current dose of warfarin and repeat INR in 1w.

## 2024-03-05 ENCOUNTER — OFFICE VISIT (OUTPATIENT)
Dept: CARDIOLOGY | Facility: CLINIC | Age: 74
End: 2024-03-05
Payer: MEDICARE

## 2024-03-05 VITALS
BODY MASS INDEX: 33.66 KG/M2 | DIASTOLIC BLOOD PRESSURE: 76 MMHG | HEART RATE: 90 BPM | SYSTOLIC BLOOD PRESSURE: 126 MMHG | WEIGHT: 156 LBS | HEIGHT: 57 IN

## 2024-03-05 DIAGNOSIS — I10 BENIGN ESSENTIAL HYPERTENSION: ICD-10-CM

## 2024-03-05 DIAGNOSIS — I25.2 ARTERIOSCLEROSIS OF CORONARY ARTERY IN PATIENT WITH HISTORY OF MYOCARDIAL INFARCTION: Primary | ICD-10-CM

## 2024-03-05 DIAGNOSIS — I25.10 ARTERIOSCLEROSIS OF CORONARY ARTERY IN PATIENT WITH HISTORY OF MYOCARDIAL INFARCTION: Primary | ICD-10-CM

## 2024-03-05 DIAGNOSIS — I48.91 ATRIAL FIBRILLATION, UNSPECIFIED TYPE (MULTI): ICD-10-CM

## 2024-03-05 PROCEDURE — 3078F DIAST BP <80 MM HG: CPT | Performed by: INTERNAL MEDICINE

## 2024-03-05 PROCEDURE — 1157F ADVNC CARE PLAN IN RCRD: CPT | Performed by: INTERNAL MEDICINE

## 2024-03-05 PROCEDURE — 3074F SYST BP LT 130 MM HG: CPT | Performed by: INTERNAL MEDICINE

## 2024-03-05 PROCEDURE — 1036F TOBACCO NON-USER: CPT | Performed by: INTERNAL MEDICINE

## 2024-03-05 PROCEDURE — 1159F MED LIST DOCD IN RCRD: CPT | Performed by: INTERNAL MEDICINE

## 2024-03-05 PROCEDURE — 99214 OFFICE O/P EST MOD 30 MIN: CPT | Performed by: INTERNAL MEDICINE

## 2024-03-05 PROCEDURE — 93000 ELECTROCARDIOGRAM COMPLETE: CPT | Performed by: INTERNAL MEDICINE

## 2024-03-05 RX ORDER — METOPROLOL TARTRATE 50 MG/1
50 TABLET ORAL 2 TIMES DAILY
Qty: 180 TABLET | Refills: 3 | Status: SHIPPED | OUTPATIENT
Start: 2024-03-05 | End: 2025-03-05

## 2024-03-05 RX ORDER — LISINOPRIL 20 MG/1
20 TABLET ORAL 2 TIMES DAILY
Qty: 180 TABLET | Refills: 3 | Status: SHIPPED | OUTPATIENT
Start: 2024-03-05 | End: 2025-03-05

## 2024-03-05 RX ORDER — AMLODIPINE BESYLATE 5 MG/1
5 TABLET ORAL DAILY
Qty: 90 TABLET | Refills: 3 | Status: SHIPPED | OUTPATIENT
Start: 2024-03-05 | End: 2025-03-05

## 2024-03-05 RX ORDER — ROSUVASTATIN CALCIUM 40 MG/1
40 TABLET, COATED ORAL DAILY
Qty: 90 TABLET | Refills: 3 | Status: SHIPPED | OUTPATIENT
Start: 2024-03-05 | End: 2025-03-05

## 2024-03-05 NOTE — PATIENT INSTRUCTIONS
Thanks for following up in office today.    1)  I am sending in the  dose of metoprolol in so that you do not have to cut the pill in half.     2)  Go to the American Heart Association website   (heart.org) to review diet recommendations.  Generally try to keep your fat grams lower than 13-15 grams a day.      3)  Please continue your cardiac medications as prescribed.    Follow up with  AILEEN Santamaria NP  in 6  months  If you have any questions, please call [phone] and choose option for Dr. Stoddard's nurse Ramya Stahl

## 2024-03-05 NOTE — PROGRESS NOTES
Chief Complaint:   No chief complaint on file.     History Of Present Illness:    Sara Russell is a 73 y.o. female presenting for 6 month follow up.  History of coronary artery disease s/p CABG (L-LAD, S-high lat Cx, S-Cx, S-RCA 2018), nonrheumatic mitral regurgitation s/p MVR 2018, atrial fibrillation (TANVI ligation and MAZE procedure) s/p RFA 2021, LV aneurysm, DL, APOLONIA on CPAP.    No chest pain, SOB, LE edema, orthopnea, palps, LH/dizziness, syncope.    Does exercise regularly - goes to the [place] for swimming, also uses stationary bike 10 mins/day.  Uses her CPAP regularly, sometimes feels like it blows too much air.    ROS:  The remainder of the review of systems was obtained, as was negative as pertains to the chief complaint.    CV testing reviewed :    2/2024  CMP  K 4.5  BUN 13  crea 0.58 ast 29, alt 21  Lipid labs chol 142, HDL 51.3,   LDL 71  trig 99     TTE 10/2022   EF 45% WMA mild to moderate conc LVH ,left atrium moderate to severely dilated , moderate reduced RV function, trace to mild aortic valve regurg, moderate mitral valve stenosis, status post mitral annular  ring repair, trace mitral valve regurg, RVSP mild to moderately elevated at 49.     [facility]  10/2018  Triple vessel CAD with proximal LAD involvement ,severe MR     Prior history:  .She was initially diagnosed with atrial fibrillation in 2017. She underwent cardioversion but did not hold sinus rhythm for more than a few weeks. Imaging suggested RCA territory LV aneurysm. She later established care with me in 2018. She was having shortness of breath with exertion. I referred her for heart catheterization showing severe multivessel coronary disease and mitral regurgitation. She underwent open heart surgery in December 2018 with LIMA to the LAD, vein graft to the high lateral circumflex, vein graft to the lateral circumflex, and vein graft to the distal RCA. She had mitral valve repair, left atrial appendage ligation, and maze procedure.  As an outpatient postoperatively, she was in atrial flutter and underwent cardioversion. She has since maintained sinus rhythm and has been chronically anticoagulated. She tells me that she gets a left upper arm pain and a left leg pain over the past couple of weeks. She went to her chiropractor who diagnosed her with sciatica and felt that her rotator cuff was causing her shoulder pain. He referred her to an orthopedic surgeon that she is seeing later this week. She does not have chest pain, shortness of breath, orthopnea, PND, palpitations, edema, or lightheadedness. No syncope. She is exercising on a stationary cycle 20-30 minutes per day. She says she feels well on the cycle. She has been watching her calories and trying to lose weight. She has noticed that her blood pressure and heart rate have increased over the last week. Her blood pressures are running in the low 140s over  and her heart rate is running in the low 100s to 112 bpm. She has obstructive sleep apnea syndrome and is wearing CPAP. She is taking her medications as prescribed. She is tolerating the change in statin to rosuvastatin without myalgias as she had previously on atorvastatin.    Last Recorded Vitals:  There were no vitals filed for this visit.    Past Medical History:  She has a past medical history of Other long term (current) drug therapy and Personal history of other specified conditions.    Past Surgical History:  She has a past surgical history that includes Other surgical history (2019); Other surgical history (2018); Other surgical history (2018); Other surgical history (2018); and  section, classic (2018).      Social History:  She reports that she has never smoked. She has never used smokeless tobacco. She reports current alcohol use of about 1.0 standard drink of alcohol per week. She reports that she does not use drugs.    Family History:  Family History   Problem Relation Name Age of  Onset    Hypertension Mother      Hyperlipidemia Mother      Hypertension Father      Hypercalcemia Father      Heart attack Mother's Sister      Heart attack Mother's Brother      Breast cancer Father's Sister      Heart attack Paternal Grandfather          Allergies:  Patient has no known allergies.    Outpatient Medications:  Current Outpatient Medications   Medication Instructions    amLODIPine (NORVASC) 5 mg, oral, Daily    cholecalciferol (Vitamin D-3) 25 MCG (1000 UT) tablet 1 tablet, oral, Daily    fish oil concentrate (Omega-3) 120-180 mg capsule oral    lisinopril 20 mg, oral, 2 times daily    LORazepam (ATIVAN) 1 mg, oral, 2 times daily PRN    metoprolol tartrate (Lopressor) 100 mg tablet oral, 2 times daily    metoprolol tartrate (LOPRESSOR) 50 mg, oral, 2 times daily    rosuvastatin (Crestor) 40 mg tablet 1 tablet, oral, Daily    warfarin (COUMADIN) 5 mg, oral, Every evening, Or as directed by clinician       Physical Exam:  Physical Exam  HENT:      Head: Normocephalic.      Nose: Nose normal.      Mouth/Throat:      Mouth: Mucous membranes are moist.   Cardiovascular:      Rate and Rhythm: Normal rate and regular rhythm.      Heart sounds: S1 normal and S2 normal.   Pulmonary:      Breath sounds: Normal breath sounds.   Abdominal:      Palpations: Abdomen is soft.   Musculoskeletal:         General: Normal range of motion.      Cervical back: Normal range of motion.   Skin:     General: Skin is warm and dry.   Neurological:      General: No focal deficit present.      Mental Status: She is alert.   Psychiatric:         Mood and Affect: Mood normal.            Last Labs:  CBC -  Lab Results   Component Value Date    WBC 9.9 11/19/2021    HGB 14.6 11/19/2021    HCT 45.3 11/19/2021    MCV 93 11/19/2021     11/19/2021       CMP -  Lab Results   Component Value Date    CALCIUM 9.4 02/27/2024    PHOS 3.5 12/27/2018    PROT 7.0 02/27/2024    ALBUMIN 3.9 02/27/2024    AST 29 02/27/2024    ALT 21  02/27/2024    ALKPHOS 49 02/27/2024    BILITOT 0.9 02/27/2024       LIPID PANEL -   Lab Results   Component Value Date    CHOL 142 02/27/2024    TRIG 99 02/27/2024    HDL 51.3 02/27/2024    CHHDL 2.8 02/27/2024    LDLF 72 05/22/2023    VLDL 20 02/27/2024    NHDL 91 02/27/2024       RENAL FUNCTION PANEL -   Lab Results   Component Value Date    GLUCOSE 92 02/27/2024     02/27/2024    K 4.5 02/27/2024     02/27/2024    CO2 31 02/27/2024    ANIONGAP 8 (L) 02/27/2024    BUN 13 02/27/2024    CREATININE 0.58 02/27/2024    CALCIUM 9.4 02/27/2024    PHOS 3.5 12/27/2018    ALBUMIN 3.9 02/27/2024        Lab Results   Component Value Date    HGBA1C 4.9 12/11/2018           Assessment/Plan   ASHD: s/p CABG. h/o LV aneurysm. no angina currently.  -not on ASA due to jantoven  -continue rosuvastatin 40mg, half tab daily - last lipids checked 2/24 with LDL 72    Afib: s/p MAZE/TANVI ligation at time of CABG, and RFA 2021 with improvement in sx  -continue metoprolol tart 50mg bid  -jantoven goal INR 2-3     Mitral regurgitations s/p MVR 2018 - VALENCIA 9/21 with trace MR  -hold off on rpt TTE for now, euvolemic    DL: LDL 72 despite halving dose of her statin  -continue rosuvastatin 40mg, half pil daily  -she will try lifestyle modifications      APOLONIA:   -urged ongoing CPAP compliance

## 2024-03-12 ENCOUNTER — LAB (OUTPATIENT)
Dept: LAB | Facility: LAB | Age: 74
End: 2024-03-12
Payer: MEDICARE

## 2024-03-12 ENCOUNTER — ANTICOAGULATION - WARFARIN VISIT (OUTPATIENT)
Dept: CARDIOLOGY | Facility: CLINIC | Age: 74
End: 2024-03-12

## 2024-03-12 DIAGNOSIS — I48.0 PAROXYSMAL ATRIAL FIBRILLATION (MULTI): ICD-10-CM

## 2024-03-12 DIAGNOSIS — I48.91 ATRIAL FIBRILLATION, UNSPECIFIED TYPE (MULTI): ICD-10-CM

## 2024-03-12 LAB
INR IN PPP BY COAGULATION ASSAY EXTERNAL: 2 (ref 2–3)
INR PPP: 2 (ref 0.9–1.1)
PROTHROMBIN TIME (PT) IN PPP BY COAGULATION ASSAY EXTERNAL: NORMAL SECONDS
PROTHROMBIN TIME: 22.6 SECONDS (ref 9.8–12.8)

## 2024-03-12 PROCEDURE — 85610 PROTHROMBIN TIME: CPT

## 2024-03-12 PROCEDURE — 36415 COLL VENOUS BLD VENIPUNCTURE: CPT

## 2024-03-12 PROCEDURE — 93793 ANTICOAG MGMT PT WARFARIN: CPT | Performed by: INTERNAL MEDICINE

## 2024-03-12 NOTE — PROGRESS NOTES
Anticoagulation Episode Summary       Current INR goal:  2.0-3.0   TTR:  56.9 % (4.8 mo)   Next INR check:  3/19/2024   INR from last check:  2.00 (3/12/2024)   Weekly max warfarin dose:     Target end date:     INR check location:     Preferred lab:     Send INR reminders to:  DO [id number] CARD1 ACOAG    Indications    Atrial fibrillation (CMS/HCC) [I48.91]             Comments:               Anticoagulation Care Providers       Provider Role Specialty Phone number    Cayetano Stoddard MD  Cardiology [phone]          Anticoagulation Monitoring INR INR Date INR Goal Trend Last Week Total Next Week Total Sun Mon Tue   3/12/2024 2.00 3/12/2024 2.0-3.0 2.0-3.0 Same 27.5 mg 27.5 mg 5 mg 2.5 mg 5 mg   2/27/2024 2.5 2/27/2024 2.0-3.0 Same 27.5 mg 27.5 mg 5 mg 2.5 mg 5 mg   2/20/2024 2.3 2/20/2024 2.0-3.0 Same 27.5 mg 27.5 mg 5 mg 2.5 mg 5 mg   2/13/2024 2.5 2/13/2024 2.0-3.0 Same 27.5 mg 27.5 mg 5 mg 2.5 mg 5 mg   2/7/2024 3.2 2/6/2024 2.0-3.0 Down 30 mg 27.5 mg 5 mg 2.5 mg -   1/29/2024 3.2 1/26/2024 2.0-3.0 Down 32.5 mg 30 mg 5 mg 2.5 mg 5 mg   1/18/2024 2.9 1/18/2024 2.0-3.0 Same 32.5 mg 32.5 mg 5 mg 5 mg 5 mg   1/5/2024 3.4 1/4/2024 2.0-3.0 Down 35 mg 32.5 mg 5 mg 5 mg 5 mg   12/27/2023 1.9 12/22/2023 2.0-3.0 Up 32.5 mg 35 mg 5 mg 5 mg 5 mg   11/27/2023 2.50 11/27/2023 2.0-3.0 Same 32.5 mg 32.5 mg 5 mg 5 mg 5 mg   11/20/2023 2.5 11/20/2023 2.0-3.0 Same 32.5 mg 32.5 mg 5 mg 5 mg 5 mg   11/14/2023 2.1 11/13/2023 2.0-3.0 Same 32.5 mg 32.5 mg 5 mg - 5 mg   11/6/2023 1.3 11/6/2023 2.0-3.0 Up 30 mg 32.5 mg 5 mg 5 mg 5 mg   10/9/2023 2.3 10/6/2023 2.0-3.0 - 0 mg 30 mg 5 mg 2.5 mg 5 mg     Anticoagulation Monitoring Wed Thu Fri Sat   3/12/2024 2.5 mg 5 mg 2.5 mg 5 mg   2/27/2024 2.5 mg 5 mg 2.5 mg 5 mg   2/20/2024 2.5 mg 5 mg 2.5 mg 5 mg   2/13/2024 2.5 mg 5 mg 2.5 mg 5 mg   2/7/2024 2.5 mg 5 mg 2.5 mg 5 mg   1/29/2024 5 mg 5 mg 2.5 mg 5 mg   1/18/2024 5 mg 5 mg 2.5 mg 5 mg   1/5/2024 5 mg 5 mg 2.5 mg 5 mg   12/27/2023 5  mg 5 mg 5 mg 5 mg   11/27/2023 5 mg 5 mg 2.5 mg 5 mg   11/20/2023 5 mg 5 mg 2.5 mg 5 mg   11/14/2023 5 mg 5 mg 2.5 mg 5 mg   11/6/2023 5 mg 5 mg 2.5 mg 5 mg   10/9/2023 5 mg 5 mg 2.5 mg 5 mg         Phone communication conducted: CWP WITH PT SAME DOSE ONE WEEK CHECK. VK    INR IN RANGE- Continue current dose of warfarin and repeat INR in 1 week.

## 2024-03-19 ENCOUNTER — LAB (OUTPATIENT)
Dept: LAB | Facility: LAB | Age: 74
End: 2024-03-19
Payer: MEDICARE

## 2024-03-19 ENCOUNTER — ANTICOAGULATION - WARFARIN VISIT (OUTPATIENT)
Dept: CARDIOLOGY | Facility: CLINIC | Age: 74
End: 2024-03-19

## 2024-03-19 DIAGNOSIS — I48.91 ATRIAL FIBRILLATION, UNSPECIFIED TYPE (MULTI): ICD-10-CM

## 2024-03-19 LAB
INR PPP: 2.1 (ref 0.9–1.1)
PROTHROMBIN TIME: 24.3 SECONDS (ref 9.8–12.8)

## 2024-03-19 PROCEDURE — 36415 COLL VENOUS BLD VENIPUNCTURE: CPT

## 2024-03-19 PROCEDURE — 85610 PROTHROMBIN TIME: CPT

## 2024-03-19 PROCEDURE — 93793 ANTICOAG MGMT PT WARFARIN: CPT | Performed by: INTERNAL MEDICINE

## 2024-03-19 NOTE — PROGRESS NOTES
Anticoagulation Episode Summary       Current INR goal:  2.0-3.0   TTR:  59.0 % (5.1 mo)   Next INR check:  4/2/2024   INR from last check:  2.1 (3/19/2024)   Weekly max warfarin dose:     Target end date:     INR check location:     Preferred lab:     Send INR reminders to:  DO [id number] CARD1 ACOAG    Indications    Atrial fibrillation (CMS/HCC) [I48.91]             Comments:               Anticoagulation Care Providers       Provider Role Specialty Phone number    Cayetano Stoddard MD  Cardiology [phone]          Anticoagulation Monitoring INR INR Date INR Goal Trend Last Week Total Next Week Total Sun Mon Tue   3/19/2024 2.1 3/19/2024 2.0-3.0 Same 27.5 mg 27.5 mg 5 mg 2.5 mg 5 mg   3/12/2024 2.00 3/12/2024 2.0-3.0 2.0-3.0 Same 27.5 mg 27.5 mg 5 mg 2.5 mg 5 mg   2/27/2024 2.5 2/27/2024 2.0-3.0 Same 27.5 mg 27.5 mg 5 mg 2.5 mg 5 mg   2/20/2024 2.3 2/20/2024 2.0-3.0 Same 27.5 mg 27.5 mg 5 mg 2.5 mg 5 mg   2/13/2024 2.5 2/13/2024 2.0-3.0 Same 27.5 mg 27.5 mg 5 mg 2.5 mg 5 mg   2/7/2024 3.2 2/6/2024 2.0-3.0 Down 30 mg 27.5 mg 5 mg 2.5 mg -   1/29/2024 3.2 1/26/2024 2.0-3.0 Down 32.5 mg 30 mg 5 mg 2.5 mg 5 mg   1/18/2024 2.9 1/18/2024 2.0-3.0 Same 32.5 mg 32.5 mg 5 mg 5 mg 5 mg   1/5/2024 3.4 1/4/2024 2.0-3.0 Down 35 mg 32.5 mg 5 mg 5 mg 5 mg   12/27/2023 1.9 12/22/2023 2.0-3.0 Up 32.5 mg 35 mg 5 mg 5 mg 5 mg   11/27/2023 2.50 11/27/2023 2.0-3.0 Same 32.5 mg 32.5 mg 5 mg 5 mg 5 mg   11/20/2023 2.5 11/20/2023 2.0-3.0 Same 32.5 mg 32.5 mg 5 mg 5 mg 5 mg   11/14/2023 2.1 11/13/2023 2.0-3.0 Same 32.5 mg 32.5 mg 5 mg - 5 mg   11/6/2023 1.3 11/6/2023 2.0-3.0 Up 30 mg 32.5 mg 5 mg 5 mg 5 mg   10/9/2023 2.3 10/6/2023 2.0-3.0 - 0 mg 30 mg 5 mg 2.5 mg 5 mg     Anticoagulation Monitoring Wed Thu Fri Sat   3/19/2024 2.5 mg 5 mg 2.5 mg 5 mg   3/12/2024 2.5 mg 5 mg 2.5 mg 5 mg   2/27/2024 2.5 mg 5 mg 2.5 mg 5 mg   2/20/2024 2.5 mg 5 mg 2.5 mg 5 mg   2/13/2024 2.5 mg 5 mg 2.5 mg 5 mg   2/7/2024 2.5 mg 5 mg 2.5 mg 5 mg   1/29/2024  5 mg 5 mg 2.5 mg 5 mg   1/18/2024 5 mg 5 mg 2.5 mg 5 mg   1/5/2024 5 mg 5 mg 2.5 mg 5 mg   12/27/2023 5 mg 5 mg 5 mg 5 mg   11/27/2023 5 mg 5 mg 2.5 mg 5 mg   11/20/2023 5 mg 5 mg 2.5 mg 5 mg   11/14/2023 5 mg 5 mg 2.5 mg 5 mg   11/6/2023 5 mg 5 mg 2.5 mg 5 mg   10/9/2023 5 mg 5 mg 2.5 mg 5 mg         Phone communication conducted: CWP SD    INR IN RANGE- Continue current dose of warfarin and repeat INR in 2W.

## 2024-03-20 ENCOUNTER — OFFICE VISIT (OUTPATIENT)
Dept: PRIMARY CARE | Facility: CLINIC | Age: 74
End: 2024-03-20
Payer: MEDICARE

## 2024-03-20 VITALS
HEART RATE: 70 BPM | BODY MASS INDEX: 33.54 KG/M2 | TEMPERATURE: 95.1 F | DIASTOLIC BLOOD PRESSURE: 76 MMHG | SYSTOLIC BLOOD PRESSURE: 126 MMHG | WEIGHT: 155 LBS | OXYGEN SATURATION: 90 %

## 2024-03-20 DIAGNOSIS — Z95.1 S/P CABG X 4: ICD-10-CM

## 2024-03-20 DIAGNOSIS — I10 BENIGN ESSENTIAL HYPERTENSION: ICD-10-CM

## 2024-03-20 DIAGNOSIS — I48.0 PAROXYSMAL ATRIAL FIBRILLATION (MULTI): ICD-10-CM

## 2024-03-20 DIAGNOSIS — F41.9 ANXIETY: ICD-10-CM

## 2024-03-20 DIAGNOSIS — G47.33 OSA ON CPAP: ICD-10-CM

## 2024-03-20 DIAGNOSIS — I50.32 CHRONIC DIASTOLIC CONGESTIVE HEART FAILURE (MULTI): ICD-10-CM

## 2024-03-20 DIAGNOSIS — Z00.00 MEDICARE ANNUAL WELLNESS VISIT, SUBSEQUENT: Primary | ICD-10-CM

## 2024-03-20 PROCEDURE — G0439 PPPS, SUBSEQ VISIT: HCPCS | Performed by: INTERNAL MEDICINE

## 2024-03-20 PROCEDURE — 1123F ACP DISCUSS/DSCN MKR DOCD: CPT | Performed by: INTERNAL MEDICINE

## 2024-03-20 PROCEDURE — 3078F DIAST BP <80 MM HG: CPT | Performed by: INTERNAL MEDICINE

## 2024-03-20 PROCEDURE — 3074F SYST BP LT 130 MM HG: CPT | Performed by: INTERNAL MEDICINE

## 2024-03-20 PROCEDURE — 1036F TOBACCO NON-USER: CPT | Performed by: INTERNAL MEDICINE

## 2024-03-20 PROCEDURE — 1157F ADVNC CARE PLAN IN RCRD: CPT | Performed by: INTERNAL MEDICINE

## 2024-03-20 PROCEDURE — 1159F MED LIST DOCD IN RCRD: CPT | Performed by: INTERNAL MEDICINE

## 2024-03-20 PROCEDURE — 1170F FXNL STATUS ASSESSED: CPT | Performed by: INTERNAL MEDICINE

## 2024-03-20 PROCEDURE — 1158F ADVNC CARE PLAN TLK DOCD: CPT | Performed by: INTERNAL MEDICINE

## 2024-03-20 PROCEDURE — 99213 OFFICE O/P EST LOW 20 MIN: CPT | Performed by: INTERNAL MEDICINE

## 2024-03-20 RX ORDER — LORAZEPAM 1 MG/1
1 TABLET ORAL 2 TIMES DAILY PRN
Qty: 60 TABLET | Refills: 2 | Status: SHIPPED | OUTPATIENT
Start: 2024-03-20 | End: 2024-06-18

## 2024-03-20 ASSESSMENT — ENCOUNTER SYMPTOMS
EYES NEGATIVE: 1
ENDOCRINE NEGATIVE: 1
GASTROINTESTINAL NEGATIVE: 1
ALLERGIC/IMMUNOLOGIC NEGATIVE: 1
NEUROLOGICAL NEGATIVE: 1
HEMATOLOGIC/LYMPHATIC NEGATIVE: 1
OCCASIONAL FEELINGS OF UNSTEADINESS: 0
CARDIOVASCULAR NEGATIVE: 1
PSYCHIATRIC NEGATIVE: 1
LOSS OF SENSATION IN FEET: 0
MUSCULOSKELETAL NEGATIVE: 1
DEPRESSION: 1
RESPIRATORY NEGATIVE: 1
CONSTITUTIONAL NEGATIVE: 1

## 2024-03-20 ASSESSMENT — PATIENT HEALTH QUESTIONNAIRE - PHQ9
10. IF YOU CHECKED OFF ANY PROBLEMS, HOW DIFFICULT HAVE THESE PROBLEMS MADE IT FOR YOU TO DO YOUR WORK, TAKE CARE OF THINGS AT HOME, OR GET ALONG WITH OTHER PEOPLE: NOT DIFFICULT AT ALL
1. LITTLE INTEREST OR PLEASURE IN DOING THINGS: NOT AT ALL
SUM OF ALL RESPONSES TO PHQ9 QUESTIONS 1 AND 2: 1
2. FEELING DOWN, DEPRESSED OR HOPELESS: SEVERAL DAYS

## 2024-03-20 ASSESSMENT — ACTIVITIES OF DAILY LIVING (ADL)
BATHING: INDEPENDENT
DOING_HOUSEWORK: INDEPENDENT
TAKING_MEDICATION: INDEPENDENT
GROCERY_SHOPPING: INDEPENDENT
DRESSING: INDEPENDENT
MANAGING_FINANCES: INDEPENDENT

## 2024-03-20 NOTE — PROGRESS NOTES
Subjective   Patient ID: Sara Russell is a 73 y.o. female who presents for 3 month follow up  and Medicare Annual Wellness Visit Subsequent.  Patient presents today in follow up re:   anxiety.    Anxiety symptoms have been overall well controlled with current medication therapy.  No adverse effects of medication reported.  No new or associated issues reported.    She continues routine follow up with Cardiology for all of her cardiac issues.        Review of Systems   Constitutional: Negative.    HENT: Negative.     Eyes: Negative.    Respiratory: Negative.     Cardiovascular: Negative.    Gastrointestinal: Negative.    Endocrine: Negative.    Genitourinary: Negative.    Musculoskeletal: Negative.    Skin: Negative.    Allergic/Immunologic: Negative.    Neurological: Negative.    Hematological: Negative.    Psychiatric/Behavioral: Negative.         Objective     Blood pressure 126/76, pulse 70, temperature 35.1 °C (95.1 °F), temperature source Temporal, weight 70.3 kg (155 lb), SpO2 90 %.   Physical Exam  Constitutional:       Appearance: Normal appearance.   Neck:      Vascular: No carotid bruit.   Cardiovascular:      Rate and Rhythm: Normal rate and regular rhythm.      Pulses: Normal pulses.      Heart sounds: Normal heart sounds. No murmur heard.  Pulmonary:      Effort: Pulmonary effort is normal.      Breath sounds: Normal breath sounds. No wheezing or rales.   Abdominal:      General: Abdomen is flat. There is no distension.      Palpations: Abdomen is soft.      Tenderness: There is no abdominal tenderness.   Musculoskeletal:         General: Normal range of motion.      Cervical back: Normal range of motion and neck supple.   Skin:     General: Skin is warm and dry.   Neurological:      General: No focal deficit present.      Mental Status: She is alert and oriented to person, place, and time.   Psychiatric:         Mood and Affect: Mood normal.         Behavior: Behavior normal.          Assessment/Plan   Problem List Items Addressed This Visit       Anxiety    Relevant Medications    LORazepam (Ativan) 1 mg tablet    Benign essential hypertension    Chronic diastolic congestive heart failure (CMS/HCC)    APOLONIA on CPAP    Atrial fibrillation (CMS/HCC)    S/P CABG x 4     Other Visit Diagnoses       Medicare annual wellness visit, subsequent    -  Primary          Medicare annual wellness completed with no new findings or issues identified.  Patient has documented advanced care planning and POA in place  Anxiety symptoms well controlled and doing well on current therapy.  Will continue present therapy and follow clinically.    She continues follow with Cardiology re:  heart issues and anticoagulation.  Cardiology continues to manage all these issues and she is doing well.  Pt. continues to use CPAP nightly and is tolerating well with clinical benefit.  No morning headaches or daytime somnolence reported. Advised to continue treatment and will continue to follow clinically.  She has a small macule on the left cheek in front of the ear that is dark colored anteriorly, but flesh-tones posteriorly.  She is advised to see Derm who she is established with.    Follow up in 3 months

## 2024-03-22 ENCOUNTER — TELEPHONE (OUTPATIENT)
Dept: PRIMARY CARE | Facility: CLINIC | Age: 74
End: 2024-03-22
Payer: MEDICARE

## 2024-03-22 DIAGNOSIS — G47.33 OSA ON CPAP: Primary | ICD-10-CM

## 2024-03-22 NOTE — TELEPHONE ENCOUNTER
Patient called in and is asking for an order for a new c-pap machine. The patient stated that her machine is over 10 years old. The patient would like that order faxed to [facility] at [phone].

## 2024-04-04 ENCOUNTER — ANTICOAGULATION - WARFARIN VISIT (OUTPATIENT)
Dept: CARDIOLOGY | Facility: CLINIC | Age: 74
End: 2024-04-04

## 2024-04-04 ENCOUNTER — LAB (OUTPATIENT)
Dept: LAB | Facility: LAB | Age: 74
End: 2024-04-04
Payer: MEDICARE

## 2024-04-04 DIAGNOSIS — I48.91 ATRIAL FIBRILLATION, UNSPECIFIED TYPE (MULTI): ICD-10-CM

## 2024-04-04 LAB
INR PPP: 1.8 (ref 0.9–1.1)
PROTHROMBIN TIME: 20.5 SECONDS (ref 9.8–12.8)

## 2024-04-04 PROCEDURE — 85610 PROTHROMBIN TIME: CPT

## 2024-04-04 PROCEDURE — 93793 ANTICOAG MGMT PT WARFARIN: CPT | Performed by: NURSE PRACTITIONER

## 2024-04-04 PROCEDURE — 36415 COLL VENOUS BLD VENIPUNCTURE: CPT

## 2024-04-04 RX ORDER — WARFARIN SODIUM 5 MG/1
5 TABLET ORAL EVERY EVENING
Qty: 90 TABLET | Refills: 1 | Status: SHIPPED | OUTPATIENT
Start: 2024-04-04

## 2024-04-05 NOTE — PROGRESS NOTES
Anticoagulation Episode Summary       Current INR goal:  2.0-3.0   TTR:  56.6 % (5.6 mo)   Next INR check:  4/11/2024   INR from last check:  1.8 (4/4/2024)   Weekly max warfarin dose:     Target end date:     INR check location:     Preferred lab:     Send INR reminders to:  DO [id number] CARD1 ACOAG    Indications    Atrial fibrillation (CMS/HCC) [I48.91]             Comments:               Anticoagulation Care Providers       Provider Role Specialty Phone number    Vi Santamaria, APRN-CNP  Cardiology [phone]          Anticoagulation Monitoring INR INR Date INR Goal Trend Last Week Total Next Week Total Sun Mon Tue 4/4/2024 1.8 4/4/2024 2.0-3.0 Up 27.5 mg 30 mg 5 mg 2.5 mg 5 mg   3/19/2024 2.1 3/19/2024 2.0-3.0 Same 27.5 mg 27.5 mg 5 mg 2.5 mg 5 mg   3/12/2024 2.00 3/12/2024 2.0-3.0 2.0-3.0 Same 27.5 mg 27.5 mg 5 mg 2.5 mg 5 mg   2/27/2024 2.5 2/27/2024 2.0-3.0 Same 27.5 mg 27.5 mg 5 mg 2.5 mg 5 mg   2/20/2024 2.3 2/20/2024 2.0-3.0 Same 27.5 mg 27.5 mg 5 mg 2.5 mg 5 mg   2/13/2024 2.5 2/13/2024 2.0-3.0 Same 27.5 mg 27.5 mg 5 mg 2.5 mg 5 mg   2/7/2024 3.2 2/6/2024 2.0-3.0 Down 30 mg 27.5 mg 5 mg 2.5 mg -   1/29/2024 3.2 1/26/2024 2.0-3.0 Down 32.5 mg 30 mg 5 mg 2.5 mg 5 mg   1/18/2024 2.9 1/18/2024 2.0-3.0 Same 32.5 mg 32.5 mg 5 mg 5 mg 5 mg   1/5/2024 3.4 1/4/2024 2.0-3.0 Down 35 mg 32.5 mg 5 mg 5 mg 5 mg   12/27/2023 1.9 12/22/2023 2.0-3.0 Up 32.5 mg 35 mg 5 mg 5 mg 5 mg   11/27/2023 2.50 11/27/2023 2.0-3.0 Same 32.5 mg 32.5 mg 5 mg 5 mg 5 mg   11/20/2023 2.5 11/20/2023 2.0-3.0 Same 32.5 mg 32.5 mg 5 mg 5 mg 5 mg   11/14/2023 2.1 11/13/2023 2.0-3.0 Same 32.5 mg 32.5 mg 5 mg - 5 mg   11/6/2023 1.3 11/6/2023 2.0-3.0 Up 30 mg 32.5 mg 5 mg 5 mg 5 mg   10/9/2023 2.3 10/6/2023 2.0-3.0 - 0 mg 30 mg 5 mg 2.5 mg 5 mg     Anticoagulation Monitoring Wed Thu Fri Sat   4/4/2024 5 mg 5 mg 2.5 mg 5 mg   3/19/2024 2.5 mg 5 mg 2.5 mg 5 mg   3/12/2024 2.5 mg 5 mg 2.5 mg 5 mg   2/27/2024 2.5 mg 5 mg 2.5 mg 5 mg   2/20/2024  2.5 mg 5 mg 2.5 mg 5 mg   2/13/2024 2.5 mg 5 mg 2.5 mg 5 mg   2/7/2024 2.5 mg 5 mg 2.5 mg 5 mg   1/29/2024 5 mg 5 mg 2.5 mg 5 mg   1/18/2024 5 mg 5 mg 2.5 mg 5 mg   1/5/2024 5 mg 5 mg 2.5 mg 5 mg   12/27/2023 5 mg 5 mg 5 mg 5 mg   11/27/2023 5 mg 5 mg 2.5 mg 5 mg   11/20/2023 5 mg 5 mg 2.5 mg 5 mg   11/14/2023 5 mg 5 mg 2.5 mg 5 mg   11/6/2023 5 mg 5 mg 2.5 mg 5 mg   10/9/2023 5 mg 5 mg 2.5 mg 5 mg         Phone communication conducted: CWP SD    INR OUT OF RANGE- Change warfarin dose to 2.5MG M&F, 5MG OTHERS and repeat INR in 1W week/s.

## 2024-04-11 ENCOUNTER — ANTICOAGULATION - WARFARIN VISIT (OUTPATIENT)
Dept: CARDIOLOGY | Facility: CLINIC | Age: 74
End: 2024-04-11
Payer: MEDICARE

## 2024-04-11 DIAGNOSIS — I48.91 ATRIAL FIBRILLATION, UNSPECIFIED TYPE (MULTI): ICD-10-CM

## 2024-04-11 LAB
INR IN PPP BY COAGULATION ASSAY EXTERNAL: 1.8 (ref 2–3)
PROTHROMBIN TIME (PT) IN PPP BY COAGULATION ASSAY EXTERNAL: ABNORMAL SECONDS

## 2024-04-11 PROCEDURE — 93793 ANTICOAG MGMT PT WARFARIN: CPT | Performed by: PHYSICIAN ASSISTANT

## 2024-04-11 NOTE — PROGRESS NOTES
Anticoagulation Episode Summary       Current INR goal:  2.0-3.0   TTR:  54.5% (5.8 mo)   Next INR check:  4/18/2024   INR from last check:  1.80 (4/11/2024)   Weekly max warfarin dose:     Target end date:     INR check location:     Preferred lab:     Send INR reminders to:  DO [id number] CARD1 ACOAG    Indications    Atrial fibrillation (CMS/HCC) [I48.91]             Comments:               Anticoagulation Care Providers       Provider Role Specialty Phone number    Vi Kumarremington, APRN-CNP  Cardiology [phone]          Anticoagulation Monitoring INR INR Date INR Goal Trend Last Week Total Next Week Total Sun Mon Tue 4/11/2024 1.80 4/11/2024 2.0-3.0 Up 30 mg 32.5 mg 5 mg 2.5 mg 5 mg   4/4/2024 1.8 4/4/2024 2.0-3.0 Up 27.5 mg 30 mg 5 mg 2.5 mg 5 mg   3/19/2024 2.1 3/19/2024 2.0-3.0 Same 27.5 mg 27.5 mg 5 mg 2.5 mg 5 mg   3/12/2024 2.00 3/12/2024 2.0-3.0 2.0-3.0 Same 27.5 mg 27.5 mg 5 mg 2.5 mg 5 mg   2/27/2024 2.5 2/27/2024 2.0-3.0 Same 27.5 mg 27.5 mg 5 mg 2.5 mg 5 mg   2/20/2024 2.3 2/20/2024 2.0-3.0 Same 27.5 mg 27.5 mg 5 mg 2.5 mg 5 mg   2/13/2024 2.5 2/13/2024 2.0-3.0 Same 27.5 mg 27.5 mg 5 mg 2.5 mg 5 mg   2/7/2024 3.2 2/6/2024 2.0-3.0 Down 30 mg 27.5 mg 5 mg 2.5 mg -   1/29/2024 3.2 1/26/2024 2.0-3.0 Down 32.5 mg 30 mg 5 mg 2.5 mg 5 mg   1/18/2024 2.9 1/18/2024 2.0-3.0 Same 32.5 mg 32.5 mg 5 mg 5 mg 5 mg   1/5/2024 3.4 1/4/2024 2.0-3.0 Down 35 mg 32.5 mg 5 mg 5 mg 5 mg   12/27/2023 1.9 12/22/2023 2.0-3.0 Up 32.5 mg 35 mg 5 mg 5 mg 5 mg   11/27/2023 2.50 11/27/2023 2.0-3.0 Same 32.5 mg 32.5 mg 5 mg 5 mg 5 mg   11/20/2023 2.5 11/20/2023 2.0-3.0 Same 32.5 mg 32.5 mg 5 mg 5 mg 5 mg   11/14/2023 2.1 11/13/2023 2.0-3.0 Same 32.5 mg 32.5 mg 5 mg - 5 mg   11/6/2023 1.3 11/6/2023 2.0-3.0 Up 30 mg 32.5 mg 5 mg 5 mg 5 mg   10/9/2023 2.3 10/6/2023 2.0-3.0 - 0 mg 30 mg 5 mg 2.5 mg 5 mg     Anticoagulation Monitoring Wed Thu Fri Sat   4/11/2024 5 mg 5 mg 5 mg 5 mg   4/4/2024 5 mg 5 mg 2.5 mg 5 mg   3/19/2024 2.5 mg  5 mg 2.5 mg 5 mg   3/12/2024 2.5 mg 5 mg 2.5 mg 5 mg   2/27/2024 2.5 mg 5 mg 2.5 mg 5 mg   2/20/2024 2.5 mg 5 mg 2.5 mg 5 mg   2/13/2024 2.5 mg 5 mg 2.5 mg 5 mg   2/7/2024 2.5 mg 5 mg 2.5 mg 5 mg   1/29/2024 5 mg 5 mg 2.5 mg 5 mg   1/18/2024 5 mg 5 mg 2.5 mg 5 mg   1/5/2024 5 mg 5 mg 2.5 mg 5 mg   12/27/2023 5 mg 5 mg 5 mg 5 mg   11/27/2023 5 mg 5 mg 2.5 mg 5 mg   11/20/2023 5 mg 5 mg 2.5 mg 5 mg   11/14/2023 5 mg 5 mg 2.5 mg 5 mg   11/6/2023 5 mg 5 mg 2.5 mg 5 mg   10/9/2023 5 mg 5 mg 2.5 mg 5 mg         Phone communication conducted: LVM SD    INR OUT OF RANGE- Change warfarin dose to 2.5MG M, 5MG OTHERS and repeat INR in 1W week/s.

## 2024-04-18 LAB
INR IN PPP BY COAGULATION ASSAY EXTERNAL: 2.2 (ref 2–3)
PROTHROMBIN TIME (PT) IN PPP BY COAGULATION ASSAY EXTERNAL: NORMAL SECONDS

## 2024-04-19 ENCOUNTER — ANTICOAGULATION - WARFARIN VISIT (OUTPATIENT)
Dept: CARDIOLOGY | Facility: CLINIC | Age: 74
End: 2024-04-19
Payer: MEDICARE

## 2024-04-19 DIAGNOSIS — I48.91 ATRIAL FIBRILLATION, UNSPECIFIED TYPE (MULTI): ICD-10-CM

## 2024-04-19 PROCEDURE — 93793 ANTICOAG MGMT PT WARFARIN: CPT | Performed by: NURSE PRACTITIONER

## 2024-04-19 NOTE — PROGRESS NOTES
Anticoagulation Episode Summary       Current INR goal:  2.0-3.0   TTR:  54.3% (6.1 mo)   Next INR check:  4/25/2024   INR from last check:  2.20 (4/18/2024)   Weekly max warfarin dose:     Target end date:     INR check location:     Preferred lab:     Send INR reminders to:  DO [id number] CARD1 ACOAG    Indications    Atrial fibrillation (Multi) [I48.91]             Comments:               Anticoagulation Care Providers       Provider Role Specialty Phone number    Vi Santamaria, APRN-CNP  Cardiology [phone]          Anticoagulation Monitoring INR INR Date INR Goal Trend Last Week Total Next Week Total Sun Mon Tue 4/19/2024 2.20 4/18/2024 2.0-3.0 Same 32.5 mg 32.5 mg 5 mg 2.5 mg 5 mg   4/11/2024 1.80 4/11/2024 2.0-3.0 Up 30 mg 32.5 mg 5 mg 2.5 mg 5 mg   4/4/2024 1.8 4/4/2024 2.0-3.0 Up 27.5 mg 30 mg 5 mg 2.5 mg 5 mg   3/19/2024 2.1 3/19/2024 2.0-3.0 Same 27.5 mg 27.5 mg 5 mg 2.5 mg 5 mg   3/12/2024 2.00 3/12/2024 2.0-3.0 2.0-3.0 Same 27.5 mg 27.5 mg 5 mg 2.5 mg 5 mg   2/27/2024 2.5 2/27/2024 2.0-3.0 Same 27.5 mg 27.5 mg 5 mg 2.5 mg 5 mg   2/20/2024 2.3 2/20/2024 2.0-3.0 Same 27.5 mg 27.5 mg 5 mg 2.5 mg 5 mg   2/13/2024 2.5 2/13/2024 2.0-3.0 Same 27.5 mg 27.5 mg 5 mg 2.5 mg 5 mg   2/7/2024 3.2 2/6/2024 2.0-3.0 Down 30 mg 27.5 mg 5 mg 2.5 mg -   1/29/2024 3.2 1/26/2024 2.0-3.0 Down 32.5 mg 30 mg 5 mg 2.5 mg 5 mg   1/18/2024 2.9 1/18/2024 2.0-3.0 Same 32.5 mg 32.5 mg 5 mg 5 mg 5 mg   1/5/2024 3.4 1/4/2024 2.0-3.0 Down 35 mg 32.5 mg 5 mg 5 mg 5 mg   12/27/2023 1.9 12/22/2023 2.0-3.0 Up 32.5 mg 35 mg 5 mg 5 mg 5 mg   11/27/2023 2.50 11/27/2023 2.0-3.0 Same 32.5 mg 32.5 mg 5 mg 5 mg 5 mg   11/20/2023 2.5 11/20/2023 2.0-3.0 Same 32.5 mg 32.5 mg 5 mg 5 mg 5 mg   11/14/2023 2.1 11/13/2023 2.0-3.0 Same 32.5 mg 32.5 mg 5 mg - 5 mg   11/6/2023 1.3 11/6/2023 2.0-3.0 Up 30 mg 32.5 mg 5 mg 5 mg 5 mg   10/9/2023 2.3 10/6/2023 2.0-3.0 - 0 mg 30 mg 5 mg 2.5 mg 5 mg     Anticoagulation Monitoring Wed Thu Fri Sat   4/19/2024 5  mg - 5 mg 5 mg   4/11/2024 5 mg 5 mg 5 mg 5 mg   4/4/2024 5 mg 5 mg 2.5 mg 5 mg   3/19/2024 2.5 mg 5 mg 2.5 mg 5 mg   3/12/2024 2.5 mg 5 mg 2.5 mg 5 mg   2/27/2024 2.5 mg 5 mg 2.5 mg 5 mg   2/20/2024 2.5 mg 5 mg 2.5 mg 5 mg   2/13/2024 2.5 mg 5 mg 2.5 mg 5 mg   2/7/2024 2.5 mg 5 mg 2.5 mg 5 mg   1/29/2024 5 mg 5 mg 2.5 mg 5 mg   1/18/2024 5 mg 5 mg 2.5 mg 5 mg   1/5/2024 5 mg 5 mg 2.5 mg 5 mg   12/27/2023 5 mg 5 mg 5 mg 5 mg   11/27/2023 5 mg 5 mg 2.5 mg 5 mg   11/20/2023 5 mg 5 mg 2.5 mg 5 mg   11/14/2023 5 mg 5 mg 2.5 mg 5 mg   11/6/2023 5 mg 5 mg 2.5 mg 5 mg   10/9/2023 5 mg 5 mg 2.5 mg 5 mg         Phone communication conducted: cwp cl    INR IN RANGE- Continue current dose of warfarin and repeat INR in 1w.

## 2024-04-26 ENCOUNTER — ANTICOAGULATION - WARFARIN VISIT (OUTPATIENT)
Dept: CARDIOLOGY | Facility: CLINIC | Age: 74
End: 2024-04-26
Payer: MEDICARE

## 2024-04-26 DIAGNOSIS — I48.91 ATRIAL FIBRILLATION, UNSPECIFIED TYPE (MULTI): ICD-10-CM

## 2024-04-26 PROCEDURE — 93793 ANTICOAG MGMT PT WARFARIN: CPT | Performed by: INTERNAL MEDICINE

## 2024-04-26 NOTE — PROGRESS NOTES
Anticoagulation Episode Summary       Current INR goal:  2.0-3.0   TTR:  56.2% (6.3 mo)   Next INR check:  5/3/2024   INR from last check:  2.20 (4/26/2024)   Weekly max warfarin dose:     Target end date:     INR check location:     Preferred lab:     Send INR reminders to:  DO [id number] CARD1 ACOAG    Indications    Atrial fibrillation (Multi) [I48.91]             Comments:               Anticoagulation Care Providers       Provider Role Specialty Phone number    Vi Santamaria, APRN-CNP  Cardiology [phone]          Anticoagulation Monitoring INR INR Date INR Goal Trend Last Week Total Next Week Total Sun Mon Tue 4/26/2024 2.20 4/26/2024 2.0-3.0 Same 32.5 mg 32.5 mg 5 mg 2.5 mg 5 mg   4/19/2024 2.20 4/18/2024 2.0-3.0 Same 32.5 mg 32.5 mg 5 mg 2.5 mg 5 mg   4/11/2024 1.80 4/11/2024 2.0-3.0 Up 30 mg 32.5 mg 5 mg 2.5 mg 5 mg   4/4/2024 1.8 4/4/2024 2.0-3.0 Up 27.5 mg 30 mg 5 mg 2.5 mg 5 mg   3/19/2024 2.1 3/19/2024 2.0-3.0 Same 27.5 mg 27.5 mg 5 mg 2.5 mg 5 mg   3/12/2024 2.00 3/12/2024 2.0-3.0 2.0-3.0 Same 27.5 mg 27.5 mg 5 mg 2.5 mg 5 mg   2/27/2024 2.5 2/27/2024 2.0-3.0 Same 27.5 mg 27.5 mg 5 mg 2.5 mg 5 mg   2/20/2024 2.3 2/20/2024 2.0-3.0 Same 27.5 mg 27.5 mg 5 mg 2.5 mg 5 mg   2/13/2024 2.5 2/13/2024 2.0-3.0 Same 27.5 mg 27.5 mg 5 mg 2.5 mg 5 mg   2/7/2024 3.2 2/6/2024 2.0-3.0 Down 30 mg 27.5 mg 5 mg 2.5 mg -   1/29/2024 3.2 1/26/2024 2.0-3.0 Down 32.5 mg 30 mg 5 mg 2.5 mg 5 mg   1/18/2024 2.9 1/18/2024 2.0-3.0 Same 32.5 mg 32.5 mg 5 mg 5 mg 5 mg   1/5/2024 3.4 1/4/2024 2.0-3.0 Down 35 mg 32.5 mg 5 mg 5 mg 5 mg   12/27/2023 1.9 12/22/2023 2.0-3.0 Up 32.5 mg 35 mg 5 mg 5 mg 5 mg   11/27/2023 2.50 11/27/2023 2.0-3.0 Same 32.5 mg 32.5 mg 5 mg 5 mg 5 mg   11/20/2023 2.5 11/20/2023 2.0-3.0 Same 32.5 mg 32.5 mg 5 mg 5 mg 5 mg   11/14/2023 2.1 11/13/2023 2.0-3.0 Same 32.5 mg 32.5 mg 5 mg - 5 mg   11/6/2023 1.3 11/6/2023 2.0-3.0 Up 30 mg 32.5 mg 5 mg 5 mg 5 mg   10/9/2023 2.3 10/6/2023 2.0-3.0 - 0 mg 30 mg 5 mg  2.5 mg 5 mg     Anticoagulation Monitoring Wed Thu Fri Sat   4/26/2024 5 mg 5 mg 5 mg 5 mg   4/19/2024 5 mg - 5 mg 5 mg   4/11/2024 5 mg 5 mg 5 mg 5 mg   4/4/2024 5 mg 5 mg 2.5 mg 5 mg   3/19/2024 2.5 mg 5 mg 2.5 mg 5 mg   3/12/2024 2.5 mg 5 mg 2.5 mg 5 mg   2/27/2024 2.5 mg 5 mg 2.5 mg 5 mg   2/20/2024 2.5 mg 5 mg 2.5 mg 5 mg   2/13/2024 2.5 mg 5 mg 2.5 mg 5 mg   2/7/2024 2.5 mg 5 mg 2.5 mg 5 mg   1/29/2024 5 mg 5 mg 2.5 mg 5 mg   1/18/2024 5 mg 5 mg 2.5 mg 5 mg   1/5/2024 5 mg 5 mg 2.5 mg 5 mg   12/27/2023 5 mg 5 mg 5 mg 5 mg   11/27/2023 5 mg 5 mg 2.5 mg 5 mg   11/20/2023 5 mg 5 mg 2.5 mg 5 mg   11/14/2023 5 mg 5 mg 2.5 mg 5 mg   11/6/2023 5 mg 5 mg 2.5 mg 5 mg   10/9/2023 5 mg 5 mg 2.5 mg 5 mg         Phone communication conducted: CWP SD    1 W

## 2024-05-10 ENCOUNTER — ANTICOAGULATION - WARFARIN VISIT (OUTPATIENT)
Dept: CARDIOLOGY | Facility: CLINIC | Age: 74
End: 2024-05-10
Payer: MEDICARE

## 2024-05-10 DIAGNOSIS — I48.91 ATRIAL FIBRILLATION, UNSPECIFIED TYPE (MULTI): ICD-10-CM

## 2024-05-10 LAB
INR IN PPP BY COAGULATION ASSAY EXTERNAL: 2.3 (ref 2–3)
PROTHROMBIN TIME (PT) IN PPP BY COAGULATION ASSAY EXTERNAL: NORMAL SECONDS

## 2024-05-10 PROCEDURE — 93793 ANTICOAG MGMT PT WARFARIN: CPT | Performed by: INTERNAL MEDICINE

## 2024-05-10 NOTE — PROGRESS NOTES
Anticoagulation Episode Summary       Current INR goal:  2.0-3.0   TTR:  59.0% (6.8 mo)   Next INR check:  5/23/2024   INR from last check:  2.30 (5/9/2024)   Weekly max warfarin dose:     Target end date:     INR check location:     Preferred lab:     Send INR reminders to:  DO [id number] CARD1 ACOAG    Indications    Atrial fibrillation (Multi) [I48.91]             Comments:               Anticoagulation Care Providers       Provider Role Specialty Phone number    Vi Santamaria, APRN-CNP  Cardiology [phone]             More data may exist     Anticoagulation Monitoring INR INR Date INR Goal Trend Last Week Total Next Week Total Sun Mon Tue   5/10/2024 2.30 5/9/2024 2.0-3.0 Same 32.5 mg 32.5 mg 5 mg 2.5 mg 5 mg   4/26/2024 2.20 4/26/2024 2.0-3.0 Same 32.5 mg 32.5 mg 5 mg 2.5 mg 5 mg   4/19/2024 2.20 4/18/2024 2.0-3.0 Same 32.5 mg 32.5 mg 5 mg 2.5 mg 5 mg   4/11/2024 1.80 4/11/2024 2.0-3.0 Up 30 mg 32.5 mg 5 mg 2.5 mg 5 mg   4/4/2024 1.8 4/4/2024 2.0-3.0 Up 27.5 mg 30 mg 5 mg 2.5 mg 5 mg   3/19/2024 2.1 3/19/2024 2.0-3.0 Same 27.5 mg 27.5 mg 5 mg 2.5 mg 5 mg   3/12/2024 2.00 3/12/2024 2.0-3.0 2.0-3.0 Same 27.5 mg 27.5 mg 5 mg 2.5 mg 5 mg   2/27/2024 2.5 2/27/2024 2.0-3.0 Same 27.5 mg 27.5 mg 5 mg 2.5 mg 5 mg   2/20/2024 2.3 2/20/2024 2.0-3.0 Same 27.5 mg 27.5 mg 5 mg 2.5 mg 5 mg   2/13/2024 2.5 2/13/2024 2.0-3.0 Same 27.5 mg 27.5 mg 5 mg 2.5 mg 5 mg   2/7/2024 3.2 2/6/2024 2.0-3.0 Down 30 mg 27.5 mg 5 mg 2.5 mg -   1/29/2024 3.2 1/26/2024 2.0-3.0 Down 32.5 mg 30 mg 5 mg 2.5 mg 5 mg   1/18/2024 2.9 1/18/2024 2.0-3.0 Same 32.5 mg 32.5 mg 5 mg 5 mg 5 mg   1/5/2024 3.4 1/4/2024 2.0-3.0 Down 35 mg 32.5 mg 5 mg 5 mg 5 mg   12/27/2023 1.9 12/22/2023 2.0-3.0 Up 32.5 mg 35 mg 5 mg 5 mg 5 mg   11/27/2023 2.50 11/27/2023 2.0-3.0 Same 32.5 mg 32.5 mg 5 mg 5 mg 5 mg   11/20/2023 2.5 11/20/2023 2.0-3.0 Same 32.5 mg 32.5 mg 5 mg 5 mg 5 mg   11/14/2023 2.1 11/13/2023 2.0-3.0 Same 32.5 mg 32.5 mg 5 mg - 5 mg   11/6/2023 1.3  11/6/2023 2.0-3.0 Up 30 mg 32.5 mg 5 mg 5 mg 5 mg   10/9/2023 2.3 10/6/2023 2.0-3.0 - 0 mg 30 mg 5 mg 2.5 mg 5 mg     Anticoagulation Monitoring Wed Thu Fri Sat   5/10/2024 5 mg 5 mg 5 mg 5 mg   4/26/2024 5 mg 5 mg 5 mg 5 mg   4/19/2024 5 mg - 5 mg 5 mg   4/11/2024 5 mg 5 mg 5 mg 5 mg   4/4/2024 5 mg 5 mg 2.5 mg 5 mg   3/19/2024 2.5 mg 5 mg 2.5 mg 5 mg   3/12/2024 2.5 mg 5 mg 2.5 mg 5 mg   2/27/2024 2.5 mg 5 mg 2.5 mg 5 mg   2/20/2024 2.5 mg 5 mg 2.5 mg 5 mg   2/13/2024 2.5 mg 5 mg 2.5 mg 5 mg   2/7/2024 2.5 mg 5 mg 2.5 mg 5 mg   1/29/2024 5 mg 5 mg 2.5 mg 5 mg   1/18/2024 5 mg 5 mg 2.5 mg 5 mg   1/5/2024 5 mg 5 mg 2.5 mg 5 mg   12/27/2023 5 mg 5 mg 5 mg 5 mg   11/27/2023 5 mg 5 mg 2.5 mg 5 mg   11/20/2023 5 mg 5 mg 2.5 mg 5 mg   11/14/2023 5 mg 5 mg 2.5 mg 5 mg   11/6/2023 5 mg 5 mg 2.5 mg 5 mg   10/9/2023 5 mg 5 mg 2.5 mg 5 mg         Phone communication conducted: LVM SD    INR IN RANGE- Continue current dose of warfarin and repeat INR in 2W.

## 2024-05-20 ENCOUNTER — ANTICOAGULATION - WARFARIN VISIT (OUTPATIENT)
Dept: CARDIOLOGY | Facility: CLINIC | Age: 74
End: 2024-05-20
Payer: MEDICARE

## 2024-05-20 DIAGNOSIS — I48.91 ATRIAL FIBRILLATION, UNSPECIFIED TYPE (MULTI): ICD-10-CM

## 2024-05-20 LAB
INR IN PPP BY COAGULATION ASSAY EXTERNAL: 2.5 (ref 2–3)
PROTHROMBIN TIME (PT) IN PPP BY COAGULATION ASSAY EXTERNAL: NORMAL SECONDS

## 2024-05-20 PROCEDURE — 93793 ANTICOAG MGMT PT WARFARIN: CPT | Performed by: INTERNAL MEDICINE

## 2024-05-20 NOTE — PROGRESS NOTES
Anticoagulation Episode Summary       Current INR goal:  2.0-3.0   TTR:  61.1% (7.1 mo)   Next INR check:  6/3/2024   INR from last check:  2.50 (5/20/2024)   Weekly max warfarin dose:     Target end date:     INR check location:     Preferred lab:     Send INR reminders to:  DO [id number] CARD1 ACOAG    Indications    Atrial fibrillation (Multi) [I48.91]             Comments:               Anticoagulation Care Providers       Provider Role Specialty Phone number    Vi Santamaria, APRN-CNP  Cardiology [phone]             More data exists     Anticoagulation Monitoring INR INR Date INR Goal Trend Last Week Total Next Week Total Sun Mon Tue 5/20/2024 2.50 5/20/2024 2.0-3.0 Same 32.5 mg 32.5 mg 5 mg 2.5 mg 5 mg   5/10/2024 2.30 5/9/2024 2.0-3.0 Same 32.5 mg 32.5 mg 5 mg 2.5 mg 5 mg   4/26/2024 2.20 4/26/2024 2.0-3.0 Same 32.5 mg 32.5 mg 5 mg 2.5 mg 5 mg   4/19/2024 2.20 4/18/2024 2.0-3.0 Same 32.5 mg 32.5 mg 5 mg 2.5 mg 5 mg   4/11/2024 1.80 4/11/2024 2.0-3.0 Up 30 mg 32.5 mg 5 mg 2.5 mg 5 mg   4/4/2024 1.8 4/4/2024 2.0-3.0 Up 27.5 mg 30 mg 5 mg 2.5 mg 5 mg   3/19/2024 2.1 3/19/2024 2.0-3.0 Same 27.5 mg 27.5 mg 5 mg 2.5 mg 5 mg   3/12/2024 2.00 3/12/2024 2.0-3.0 2.0-3.0 Same 27.5 mg 27.5 mg 5 mg 2.5 mg 5 mg   2/27/2024 2.5 2/27/2024 2.0-3.0 Same 27.5 mg 27.5 mg 5 mg 2.5 mg 5 mg   2/20/2024 2.3 2/20/2024 2.0-3.0 Same 27.5 mg 27.5 mg 5 mg 2.5 mg 5 mg   2/13/2024 2.5 2/13/2024 2.0-3.0 Same 27.5 mg 27.5 mg 5 mg 2.5 mg 5 mg   2/7/2024 3.2 2/6/2024 2.0-3.0 Down 30 mg 27.5 mg 5 mg 2.5 mg -   1/29/2024 3.2 1/26/2024 2.0-3.0 Down 32.5 mg 30 mg 5 mg 2.5 mg 5 mg   1/18/2024 2.9 1/18/2024 2.0-3.0 Same 32.5 mg 32.5 mg 5 mg 5 mg 5 mg   1/5/2024 3.4 1/4/2024 2.0-3.0 Down 35 mg 32.5 mg 5 mg 5 mg 5 mg   12/27/2023 1.9 12/22/2023 2.0-3.0 Up 32.5 mg 35 mg 5 mg 5 mg 5 mg   11/27/2023 2.50 11/27/2023 2.0-3.0 Same 32.5 mg 32.5 mg 5 mg 5 mg 5 mg   11/20/2023 2.5 11/20/2023 2.0-3.0 Same 32.5 mg 32.5 mg 5 mg 5 mg 5 mg   11/14/2023 2.1  11/13/2023 2.0-3.0 Same 32.5 mg 32.5 mg 5 mg - 5 mg   11/6/2023 1.3 11/6/2023 2.0-3.0 Up 30 mg 32.5 mg 5 mg 5 mg 5 mg     Anticoagulation Monitoring Wed Thu Fri Sat   5/20/2024 5 mg 5 mg 5 mg 5 mg   5/10/2024 5 mg 5 mg 5 mg 5 mg   4/26/2024 5 mg 5 mg 5 mg 5 mg   4/19/2024 5 mg - 5 mg 5 mg   4/11/2024 5 mg 5 mg 5 mg 5 mg   4/4/2024 5 mg 5 mg 2.5 mg 5 mg   3/19/2024 2.5 mg 5 mg 2.5 mg 5 mg   3/12/2024 2.5 mg 5 mg 2.5 mg 5 mg   2/27/2024 2.5 mg 5 mg 2.5 mg 5 mg   2/20/2024 2.5 mg 5 mg 2.5 mg 5 mg   2/13/2024 2.5 mg 5 mg 2.5 mg 5 mg   2/7/2024 2.5 mg 5 mg 2.5 mg 5 mg   1/29/2024 5 mg 5 mg 2.5 mg 5 mg   1/18/2024 5 mg 5 mg 2.5 mg 5 mg   1/5/2024 5 mg 5 mg 2.5 mg 5 mg   12/27/2023 5 mg 5 mg 5 mg 5 mg   11/27/2023 5 mg 5 mg 2.5 mg 5 mg   11/20/2023 5 mg 5 mg 2.5 mg 5 mg   11/14/2023 5 mg 5 mg 2.5 mg 5 mg   11/6/2023 5 mg 5 mg 2.5 mg 5 mg         Phone communication conducted: CWP SD    INR IN RANGE- Continue current dose of warfarin and repeat INR in 2W.

## 2024-05-22 ENCOUNTER — TELEPHONE (OUTPATIENT)
Dept: CARDIOLOGY | Facility: CLINIC | Age: 74
End: 2024-05-22
Payer: MEDICARE

## 2024-05-31 ENCOUNTER — ANTICOAGULATION - WARFARIN VISIT (OUTPATIENT)
Dept: CARDIOLOGY | Facility: CLINIC | Age: 74
End: 2024-05-31
Payer: MEDICARE

## 2024-05-31 DIAGNOSIS — I48.91 ATRIAL FIBRILLATION, UNSPECIFIED TYPE (MULTI): ICD-10-CM

## 2024-05-31 LAB
POC INR: 2.1 (ref 2–3)
POC PROTHROMBIN TIME: NORMAL

## 2024-05-31 PROCEDURE — 93793 ANTICOAG MGMT PT WARFARIN: CPT | Performed by: INTERNAL MEDICINE

## 2024-05-31 NOTE — PROGRESS NOTES
Anticoagulation Episode Summary       Current INR goal:  2.0-3.0   TTR:  63.0% (7.5 mo)   Next INR check:  6/14/2024   INR from last check:  2.10 (5/31/2024)   Weekly max warfarin dose:     Target end date:     INR check location:     Preferred lab:     Send INR reminders to:  DO [id number] CARD1 ACOAG    Indications    Atrial fibrillation (Multi) [I48.91]             Comments:               Anticoagulation Care Providers       Provider Role Specialty Phone number    Vi Santamaria, APRN-CNP  Cardiology [phone]             More data exists     Anticoagulation Monitoring INR INR Date INR Goal Trend Last Week Total Next Week Total Sun Mon Tue 5/31/2024 2.10 5/31/2024 2.0-3.0 Same 32.5 mg 32.5 mg 5 mg 2.5 mg 5 mg   5/20/2024 2.50 5/20/2024 2.0-3.0 Same 32.5 mg 32.5 mg 5 mg 2.5 mg 5 mg   5/10/2024 2.30 5/9/2024 2.0-3.0 Same 32.5 mg 32.5 mg 5 mg 2.5 mg 5 mg   4/26/2024 2.20 4/26/2024 2.0-3.0 Same 32.5 mg 32.5 mg 5 mg 2.5 mg 5 mg   4/19/2024 2.20 4/18/2024 2.0-3.0 Same 32.5 mg 32.5 mg 5 mg 2.5 mg 5 mg   4/11/2024 1.80 4/11/2024 2.0-3.0 Up 30 mg 32.5 mg 5 mg 2.5 mg 5 mg   4/4/2024 1.8 4/4/2024 2.0-3.0 Up 27.5 mg 30 mg 5 mg 2.5 mg 5 mg   3/19/2024 2.1 3/19/2024 2.0-3.0 Same 27.5 mg 27.5 mg 5 mg 2.5 mg 5 mg   3/12/2024 2.00 3/12/2024 2.0-3.0 2.0-3.0 Same 27.5 mg 27.5 mg 5 mg 2.5 mg 5 mg   2/27/2024 2.5 2/27/2024 2.0-3.0 Same 27.5 mg 27.5 mg 5 mg 2.5 mg 5 mg   2/20/2024 2.3 2/20/2024 2.0-3.0 Same 27.5 mg 27.5 mg 5 mg 2.5 mg 5 mg   2/13/2024 2.5 2/13/2024 2.0-3.0 Same 27.5 mg 27.5 mg 5 mg 2.5 mg 5 mg   2/7/2024 3.2 2/6/2024 2.0-3.0 Down 30 mg 27.5 mg 5 mg 2.5 mg -   1/29/2024 3.2 1/26/2024 2.0-3.0 Down 32.5 mg 30 mg 5 mg 2.5 mg 5 mg   1/18/2024 2.9 1/18/2024 2.0-3.0 Same 32.5 mg 32.5 mg 5 mg 5 mg 5 mg   1/5/2024 3.4 1/4/2024 2.0-3.0 Down 35 mg 32.5 mg 5 mg 5 mg 5 mg   12/27/2023 1.9 12/22/2023 2.0-3.0 Up 32.5 mg 35 mg 5 mg 5 mg 5 mg   11/27/2023 2.50 11/27/2023 2.0-3.0 Same 32.5 mg 32.5 mg 5 mg 5 mg 5 mg   11/20/2023 2.5  11/20/2023 2.0-3.0 Same 32.5 mg 32.5 mg 5 mg 5 mg 5 mg   11/14/2023 2.1 11/13/2023 2.0-3.0 Same 32.5 mg 32.5 mg 5 mg - 5 mg     Anticoagulation Monitoring Wed Thu Fri Sat   5/31/2024 5 mg 5 mg 5 mg 5 mg   5/20/2024 5 mg 5 mg 5 mg 5 mg   5/10/2024 5 mg 5 mg 5 mg 5 mg   4/26/2024 5 mg 5 mg 5 mg 5 mg   4/19/2024 5 mg - 5 mg 5 mg   4/11/2024 5 mg 5 mg 5 mg 5 mg   4/4/2024 5 mg 5 mg 2.5 mg 5 mg   3/19/2024 2.5 mg 5 mg 2.5 mg 5 mg   3/12/2024 2.5 mg 5 mg 2.5 mg 5 mg   2/27/2024 2.5 mg 5 mg 2.5 mg 5 mg   2/20/2024 2.5 mg 5 mg 2.5 mg 5 mg   2/13/2024 2.5 mg 5 mg 2.5 mg 5 mg   2/7/2024 2.5 mg 5 mg 2.5 mg 5 mg   1/29/2024 5 mg 5 mg 2.5 mg 5 mg   1/18/2024 5 mg 5 mg 2.5 mg 5 mg   1/5/2024 5 mg 5 mg 2.5 mg 5 mg   12/27/2023 5 mg 5 mg 5 mg 5 mg   11/27/2023 5 mg 5 mg 2.5 mg 5 mg   11/20/2023 5 mg 5 mg 2.5 mg 5 mg   11/14/2023 5 mg 5 mg 2.5 mg 5 mg         Phone communication conducted:     PER LAURA SCHUMACHER-  INR IN RANGE- Continue current dose of warfarin and repeat INR in 2 weeks.

## 2024-06-03 ENCOUNTER — APPOINTMENT (OUTPATIENT)
Dept: CARDIOLOGY | Facility: CLINIC | Age: 74
End: 2024-06-03
Payer: MEDICARE

## 2024-06-14 ENCOUNTER — APPOINTMENT (OUTPATIENT)
Dept: CARDIOLOGY | Facility: CLINIC | Age: 74
End: 2024-06-14
Payer: MEDICARE

## 2024-06-14 ENCOUNTER — ANTICOAGULATION - WARFARIN VISIT (OUTPATIENT)
Dept: CARDIOLOGY | Facility: CLINIC | Age: 74
End: 2024-06-14

## 2024-06-14 DIAGNOSIS — I48.91 ATRIAL FIBRILLATION, UNSPECIFIED TYPE (MULTI): ICD-10-CM

## 2024-06-14 LAB
POC INR: 2.8 (ref 2–3)
POC PROTHROMBIN TIME: NORMAL

## 2024-06-14 PROCEDURE — 85610 PROTHROMBIN TIME: CPT | Performed by: INTERNAL MEDICINE

## 2024-06-14 PROCEDURE — 93793 ANTICOAG MGMT PT WARFARIN: CPT | Performed by: INTERNAL MEDICINE

## 2024-06-17 NOTE — PROGRESS NOTES
Anticoagulation Episode Summary       Current INR goal:  2.0-3.0   TTR:  65.2% (8 mo)   Next INR check:  7/12/2024   INR from last check:  2.80 (6/14/2024)   Weekly max warfarin dose:     Target end date:     INR check location:     Preferred lab:     Send INR reminders to:  DO [id number] CARD1 ACOAG    Indications    Atrial fibrillation (Multi) [I48.91]             Comments:               Anticoagulation Care Providers       Provider Role Specialty Phone number    Vi Santamaria, APRN-CNP  Cardiology [phone]             More data exists     Anticoagulation Monitoring INR INR Date INR Goal Trend Last Week Total Next Week Total Sun Mon Tue 6/14/2024 2.80 6/14/2024 2.0-3.0 Same 32.5 mg 32.5 mg 5 mg 2.5 mg 5 mg   5/31/2024 2.10 5/31/2024 2.0-3.0 Same 32.5 mg 32.5 mg 5 mg 2.5 mg 5 mg   5/20/2024 2.50 5/20/2024 2.0-3.0 Same 32.5 mg 32.5 mg 5 mg 2.5 mg 5 mg   5/10/2024 2.30 5/9/2024 2.0-3.0 Same 32.5 mg 32.5 mg 5 mg 2.5 mg 5 mg   4/26/2024 2.20 4/26/2024 2.0-3.0 Same 32.5 mg 32.5 mg 5 mg 2.5 mg 5 mg   4/19/2024 2.20 4/18/2024 2.0-3.0 Same 32.5 mg 32.5 mg 5 mg 2.5 mg 5 mg   4/11/2024 1.80 4/11/2024 2.0-3.0 Up 30 mg 32.5 mg 5 mg 2.5 mg 5 mg   4/4/2024 1.8 4/4/2024 2.0-3.0 Up 27.5 mg 30 mg 5 mg 2.5 mg 5 mg   3/19/2024 2.1 3/19/2024 2.0-3.0 Same 27.5 mg 27.5 mg 5 mg 2.5 mg 5 mg   3/12/2024 2.00 3/12/2024 2.0-3.0 2.0-3.0 Same 27.5 mg 27.5 mg 5 mg 2.5 mg 5 mg   2/27/2024 2.5 2/27/2024 2.0-3.0 Same 27.5 mg 27.5 mg 5 mg 2.5 mg 5 mg   2/20/2024 2.3 2/20/2024 2.0-3.0 Same 27.5 mg 27.5 mg 5 mg 2.5 mg 5 mg   2/13/2024 2.5 2/13/2024 2.0-3.0 Same 27.5 mg 27.5 mg 5 mg 2.5 mg 5 mg   2/7/2024 3.2 2/6/2024 2.0-3.0 Down 30 mg 27.5 mg 5 mg 2.5 mg -   1/29/2024 3.2 1/26/2024 2.0-3.0 Down 32.5 mg 30 mg 5 mg 2.5 mg 5 mg   1/18/2024 2.9 1/18/2024 2.0-3.0 Same 32.5 mg 32.5 mg 5 mg 5 mg 5 mg   1/5/2024 3.4 1/4/2024 2.0-3.0 Down 35 mg 32.5 mg 5 mg 5 mg 5 mg   12/27/2023 1.9 12/22/2023 2.0-3.0 Up 32.5 mg 35 mg 5 mg 5 mg 5 mg   11/27/2023 2.50  11/27/2023 2.0-3.0 Same 32.5 mg 32.5 mg 5 mg 5 mg 5 mg   11/20/2023 2.5 11/20/2023 2.0-3.0 Same 32.5 mg 32.5 mg 5 mg 5 mg 5 mg     Anticoagulation Monitoring Wed Thu Fri Sat   6/14/2024 5 mg 5 mg 5 mg 5 mg   5/31/2024 5 mg 5 mg 5 mg 5 mg   5/20/2024 5 mg 5 mg 5 mg 5 mg   5/10/2024 5 mg 5 mg 5 mg 5 mg   4/26/2024 5 mg 5 mg 5 mg 5 mg   4/19/2024 5 mg - 5 mg 5 mg   4/11/2024 5 mg 5 mg 5 mg 5 mg   4/4/2024 5 mg 5 mg 2.5 mg 5 mg   3/19/2024 2.5 mg 5 mg 2.5 mg 5 mg   3/12/2024 2.5 mg 5 mg 2.5 mg 5 mg   2/27/2024 2.5 mg 5 mg 2.5 mg 5 mg   2/20/2024 2.5 mg 5 mg 2.5 mg 5 mg   2/13/2024 2.5 mg 5 mg 2.5 mg 5 mg   2/7/2024 2.5 mg 5 mg 2.5 mg 5 mg   1/29/2024 5 mg 5 mg 2.5 mg 5 mg   1/18/2024 5 mg 5 mg 2.5 mg 5 mg   1/5/2024 5 mg 5 mg 2.5 mg 5 mg   12/27/2023 5 mg 5 mg 5 mg 5 mg   11/27/2023 5 mg 5 mg 2.5 mg 5 mg   11/20/2023 5 mg 5 mg 2.5 mg 5 mg         Phone communication conducted: cwp cl    INR IN RANGE- Continue current dose of warfarin and repeat INR in 4w.

## 2024-06-20 ENCOUNTER — APPOINTMENT (OUTPATIENT)
Dept: PRIMARY CARE | Facility: CLINIC | Age: 74
End: 2024-06-20
Payer: MEDICARE

## 2024-06-20 VITALS
HEART RATE: 81 BPM | DIASTOLIC BLOOD PRESSURE: 70 MMHG | BODY MASS INDEX: 33.71 KG/M2 | WEIGHT: 155.8 LBS | TEMPERATURE: 97.2 F | SYSTOLIC BLOOD PRESSURE: 130 MMHG | OXYGEN SATURATION: 96 %

## 2024-06-20 DIAGNOSIS — F41.9 ANXIETY: Primary | ICD-10-CM

## 2024-06-20 DIAGNOSIS — I48.0 PAROXYSMAL ATRIAL FIBRILLATION (MULTI): ICD-10-CM

## 2024-06-20 DIAGNOSIS — G47.33 OSA ON CPAP: ICD-10-CM

## 2024-06-20 DIAGNOSIS — Z79.899 MEDICATION MANAGEMENT: ICD-10-CM

## 2024-06-20 DIAGNOSIS — Z95.1 S/P CABG X 4: ICD-10-CM

## 2024-06-20 DIAGNOSIS — E78.00 PURE HYPERCHOLESTEROLEMIA: ICD-10-CM

## 2024-06-20 DIAGNOSIS — B35.6 TINEA CRURIS: ICD-10-CM

## 2024-06-20 DIAGNOSIS — I10 BENIGN ESSENTIAL HYPERTENSION: ICD-10-CM

## 2024-06-20 LAB
POC AMPHETAMINE: NEGATIVE NG/ML
POC BARBITURATES: NEGATIVE NG/ML
POC BENZODIAZEPINES: POSITIVE NG/ML
POC BUPRENORPHINE URINE: NEGATIVE NG/ML
POC COCAINE: NEGATIVE NG/ML
POC MDMA (NG/ML) IN URINE: NEGATIVE NG/ML
POC METHADONE MANUALLY ENTERED: NEGATIVE NG/ML
POC METHAMPHETAMINE: NEGATIVE NG/ML
POC MORPHINE URINE: ABNORMAL NG/ML
POC OPIATES: NEGATIVE NG/ML
POC OXYCODONE: NEGATIVE NG/ML
POC PHENCYCLIDINE (PCP): NEGATIVE NG/ML
POC THC: NEGATIVE NG/ML
POC TICYCLIC ANTIDEPRESSANTS (TCA): ABNORMAL NG/ML

## 2024-06-20 PROCEDURE — 3075F SYST BP GE 130 - 139MM HG: CPT | Performed by: INTERNAL MEDICINE

## 2024-06-20 PROCEDURE — 1159F MED LIST DOCD IN RCRD: CPT | Performed by: INTERNAL MEDICINE

## 2024-06-20 PROCEDURE — 99213 OFFICE O/P EST LOW 20 MIN: CPT | Performed by: INTERNAL MEDICINE

## 2024-06-20 PROCEDURE — 80305 DRUG TEST PRSMV DIR OPT OBS: CPT | Performed by: INTERNAL MEDICINE

## 2024-06-20 PROCEDURE — G2211 COMPLEX E/M VISIT ADD ON: HCPCS | Performed by: INTERNAL MEDICINE

## 2024-06-20 PROCEDURE — 3078F DIAST BP <80 MM HG: CPT | Performed by: INTERNAL MEDICINE

## 2024-06-20 PROCEDURE — 1123F ACP DISCUSS/DSCN MKR DOCD: CPT | Performed by: INTERNAL MEDICINE

## 2024-06-20 PROCEDURE — 1157F ADVNC CARE PLAN IN RCRD: CPT | Performed by: INTERNAL MEDICINE

## 2024-06-20 RX ORDER — NYSTATIN 100000 [USP'U]/G
1 POWDER TOPICAL 2 TIMES DAILY
Qty: 30 G | Refills: 0 | Status: SHIPPED | OUTPATIENT
Start: 2024-06-20

## 2024-06-20 RX ORDER — LORAZEPAM 1 MG/1
1 TABLET ORAL 2 TIMES DAILY PRN
Qty: 60 TABLET | Refills: 2 | Status: SHIPPED | OUTPATIENT
Start: 2024-06-20 | End: 2024-09-18

## 2024-06-20 ASSESSMENT — ENCOUNTER SYMPTOMS
ALLERGIC/IMMUNOLOGIC NEGATIVE: 1
PSYCHIATRIC NEGATIVE: 1
CONSTITUTIONAL NEGATIVE: 1
NEUROLOGICAL NEGATIVE: 1
CARDIOVASCULAR NEGATIVE: 1
HEMATOLOGIC/LYMPHATIC NEGATIVE: 1
GASTROINTESTINAL NEGATIVE: 1
ENDOCRINE NEGATIVE: 1
EYES NEGATIVE: 1
RESPIRATORY NEGATIVE: 1
MUSCULOSKELETAL NEGATIVE: 1

## 2024-06-20 NOTE — PROGRESS NOTES
Subjective   Patient ID: Sara Russell is a 74 y.o. female who presents for 3 month follow up .  Patient presents today in follow up re:   anxiety.    Anxiety symptoms have been overall well controlled with current medication therapy.  No adverse effects of medication reported.  No new or associated issues reported.    She continues routine follow up with Cardiology for all of her cardiac issues.        Review of Systems   Constitutional: Negative.    HENT: Negative.     Eyes: Negative.    Respiratory: Negative.     Cardiovascular: Negative.    Gastrointestinal: Negative.    Endocrine: Negative.    Genitourinary: Negative.    Musculoskeletal: Negative.    Skin: Negative.    Allergic/Immunologic: Negative.    Neurological: Negative.    Hematological: Negative.    Psychiatric/Behavioral: Negative.         Objective     Blood pressure 130/70, pulse 81, temperature 36.2 °C (97.2 °F), temperature source Temporal, weight 70.7 kg (155 lb 12.8 oz), SpO2 96%.   Physical Exam  Constitutional:       Appearance: Normal appearance.   Neck:      Vascular: No carotid bruit.   Cardiovascular:      Rate and Rhythm: Normal rate and regular rhythm.      Pulses: Normal pulses.      Heart sounds: Normal heart sounds. No murmur heard.  Pulmonary:      Effort: Pulmonary effort is normal.      Breath sounds: Normal breath sounds. No wheezing or rales.   Abdominal:      General: Abdomen is flat. There is no distension.      Palpations: Abdomen is soft.      Tenderness: There is no abdominal tenderness.   Musculoskeletal:         General: Normal range of motion.      Cervical back: Normal range of motion and neck supple.   Skin:     General: Skin is warm and dry.   Neurological:      General: No focal deficit present.      Mental Status: She is alert and oriented to person, place, and time.   Psychiatric:         Mood and Affect: Mood normal.         Behavior: Behavior normal.         Assessment/Plan   Problem List Items Addressed This  Visit       Anxiety - Primary    Relevant Medications    LORazepam (Ativan) 1 mg tablet    Other Relevant Orders    POCT waived urine drug screen manually resulted    Benign essential hypertension    Hyperlipidemia    APOLONIA on CPAP    Atrial fibrillation (Multi)    S/P CABG x 4     Other Visit Diagnoses       Medication management        Relevant Orders    POCT waived urine drug screen manually resulted    Tinea cruris        Relevant Medications    nystatin (Mycostatin) 100,000 unit/gram powder            Anxiety symptoms well controlled and doing well on current therapy.  Will continue present therapy and follow clinically.  I have personally reviewed the [facility] report.  This report is scanned into the electronic medical record.  I have considered the risks of abuse, dependence, addiction and diversion.  I believe that it is clinically appropriate to prescribe this medication.  CSA reviewed/renewed today.  UDS reviewed today with expected findings.     She continues follow with Cardiology re:  heart issues and anticoagulation.  Cardiology continues to manage all these issues and she is doing well.  Pt. continues to use CPAP nightly and is tolerating well with clinical benefit.  No morning headaches or daytime somnolence reported. Advised to continue treatment and will continue to follow clinically.  She states that she got a new CPAP machine about 2 months ago and states she is doing very well.      Follow up in 3 months

## 2024-07-10 ENCOUNTER — ANTICOAGULATION - WARFARIN VISIT (OUTPATIENT)
Dept: CARDIOLOGY | Facility: CLINIC | Age: 74
End: 2024-07-10

## 2024-07-10 ENCOUNTER — APPOINTMENT (OUTPATIENT)
Dept: CARDIOLOGY | Facility: CLINIC | Age: 74
End: 2024-07-10
Payer: MEDICARE

## 2024-07-10 DIAGNOSIS — I48.91 ATRIAL FIBRILLATION, UNSPECIFIED TYPE (MULTI): ICD-10-CM

## 2024-07-10 LAB
POC INR: 2.7 (ref 2–3)
POC PROTHROMBIN TIME: NORMAL

## 2024-07-10 PROCEDURE — 93793 ANTICOAG MGMT PT WARFARIN: CPT | Performed by: INTERNAL MEDICINE

## 2024-07-10 PROCEDURE — 85610 PROTHROMBIN TIME: CPT | Performed by: INTERNAL MEDICINE

## 2024-07-10 NOTE — PROGRESS NOTES
Anticoagulation Episode Summary       Current INR goal:  2.0-3.0   TTR:  68.6% (8.8 mo)   Next INR check:  8/7/2024   INR from last check:  2.70 (7/10/2024)   Weekly max warfarin dose:  --   Target end date:  --   INR check location:  --   Preferred lab:  --   Send INR reminders to:  DO [id number] CARD1 ACOAG    Indications    Atrial fibrillation (Multi) [I48.91]             Comments:  --             Anticoagulation Care Providers       Provider Role Specialty Phone number    Vi ALFARO Rosalio, APRN-CNP  Cardiology [phone]             More data exists     Anticoagulation Monitoring INR INR Date INR Goal Trend Last Week Total Next Week Total Sun Mon Tue   7/10/2024 2.70 7/10/2024 2.0-3.0 Same 32.5 mg 32.5 mg 5 mg 2.5 mg 5 mg   6/14/2024 2.80 6/14/2024 2.0-3.0 Same 32.5 mg 32.5 mg 5 mg 2.5 mg 5 mg   5/31/2024 2.10 5/31/2024 2.0-3.0 Same 32.5 mg 32.5 mg 5 mg 2.5 mg 5 mg   5/20/2024 2.50 5/20/2024 2.0-3.0 Same 32.5 mg 32.5 mg 5 mg 2.5 mg 5 mg   5/10/2024 2.30 5/9/2024 2.0-3.0 Same 32.5 mg 32.5 mg 5 mg 2.5 mg 5 mg   4/26/2024 2.20 4/26/2024 2.0-3.0 Same 32.5 mg 32.5 mg 5 mg 2.5 mg 5 mg   4/19/2024 2.20 4/18/2024 2.0-3.0 Same 32.5 mg 32.5 mg 5 mg 2.5 mg 5 mg   4/11/2024 1.80 4/11/2024 2.0-3.0 Up 30 mg 32.5 mg 5 mg 2.5 mg 5 mg   4/4/2024 1.8 4/4/2024 2.0-3.0 Up 27.5 mg 30 mg 5 mg 2.5 mg 5 mg   3/19/2024 2.1 3/19/2024 2.0-3.0 Same 27.5 mg 27.5 mg 5 mg 2.5 mg 5 mg   3/12/2024 2.00 -- 3/12/2024 2.0-3.0 2.0-3.0 Same 27.5 mg 27.5 mg 5 mg 2.5 mg 5 mg   2/27/2024 2.5 2/27/2024 2.0-3.0 Same 27.5 mg 27.5 mg 5 mg 2.5 mg 5 mg   2/20/2024 2.3 2/20/2024 2.0-3.0 Same 27.5 mg 27.5 mg 5 mg 2.5 mg 5 mg   2/13/2024 2.5 2/13/2024 2.0-3.0 Same 27.5 mg 27.5 mg 5 mg 2.5 mg 5 mg   2/7/2024 3.2 2/6/2024 2.0-3.0 Down 30 mg 27.5 mg 5 mg 2.5 mg -   1/29/2024 3.2 1/26/2024 2.0-3.0 Down 32.5 mg 30 mg 5 mg 2.5 mg 5 mg   1/18/2024 2.9 1/18/2024 2.0-3.0 Same 32.5 mg 32.5 mg 5 mg 5 mg 5 mg   1/5/2024 3.4 1/4/2024 2.0-3.0 Down 35 mg 32.5 mg 5 mg 5 mg 5 mg    12/27/2023 1.9 12/22/2023 2.0-3.0 Up 32.5 mg 35 mg 5 mg 5 mg 5 mg   11/27/2023 2.50 11/27/2023 2.0-3.0 Same 32.5 mg 32.5 mg 5 mg 5 mg 5 mg     Anticoagulation Monitoring Wed Thu Fri Sat   7/10/2024 5 mg 5 mg 5 mg 5 mg   6/14/2024 5 mg 5 mg 5 mg 5 mg   5/31/2024 5 mg 5 mg 5 mg 5 mg   5/20/2024 5 mg 5 mg 5 mg 5 mg   5/10/2024 5 mg 5 mg 5 mg 5 mg   4/26/2024 5 mg 5 mg 5 mg 5 mg   4/19/2024 5 mg - 5 mg 5 mg   4/11/2024 5 mg 5 mg 5 mg 5 mg   4/4/2024 5 mg 5 mg 2.5 mg 5 mg   3/19/2024 2.5 mg 5 mg 2.5 mg 5 mg   3/12/2024 2.5 mg 5 mg 2.5 mg 5 mg   2/27/2024 2.5 mg 5 mg 2.5 mg 5 mg   2/20/2024 2.5 mg 5 mg 2.5 mg 5 mg   2/13/2024 2.5 mg 5 mg 2.5 mg 5 mg   2/7/2024 2.5 mg 5 mg 2.5 mg 5 mg   1/29/2024 5 mg 5 mg 2.5 mg 5 mg   1/18/2024 5 mg 5 mg 2.5 mg 5 mg   1/5/2024 5 mg 5 mg 2.5 mg 5 mg   12/27/2023 5 mg 5 mg 5 mg 5 mg   11/27/2023 5 mg 5 mg 2.5 mg 5 mg      Details          Multiple values from one day are sorted in reverse-chronological order                 Phone communication conducted: C[facility] [place]    INR IN RANGE- Continue current dose of warfarin and repeat INR in 4W.

## 2024-08-07 ENCOUNTER — ANTICOAGULATION - WARFARIN VISIT (OUTPATIENT)
Dept: CARDIOLOGY | Facility: HOSPITAL | Age: 74
End: 2024-08-07

## 2024-08-07 ENCOUNTER — APPOINTMENT (OUTPATIENT)
Dept: CARDIOLOGY | Facility: CLINIC | Age: 74
End: 2024-08-07
Payer: MEDICARE

## 2024-08-07 DIAGNOSIS — I48.91 ATRIAL FIBRILLATION, UNSPECIFIED TYPE (MULTI): ICD-10-CM

## 2024-08-07 LAB
POC INR: 2.8 (ref 2–3)
POC PROTHROMBIN TIME: NORMAL

## 2024-08-07 PROCEDURE — 93793 ANTICOAG MGMT PT WARFARIN: CPT | Performed by: INTERNAL MEDICINE

## 2024-08-07 NOTE — PROGRESS NOTES
Anticoagulation Episode Summary       Current INR goal:  2.0-3.0   TTR:  71.6% (9.8 mo)   Next INR check:  9/4/2024   INR from last check:  2.80 (8/7/2024)   Weekly max warfarin dose:  --   Target end date:  --   INR check location:  --   Preferred lab:  --   Send INR reminders to:  DO [id number] CARD1 ACOAG    Indications    Atrial fibrillation (Multi) [I48.91]             Comments:  --             Anticoagulation Care Providers       Provider Role Specialty Phone number    Vi ALFARO Rosalio, APRN-CNP  Cardiology [phone]             More data exists     Anticoagulation Monitoring INR INR Date INR Goal Trend Last Week Total Next Week Total Sun Mon Tue 8/7/2024 2.80 8/7/2024 2.0-3.0 Same 32.5 mg 32.5 mg 5 mg 2.5 mg 5 mg   7/10/2024 2.70 7/10/2024 2.0-3.0 Same 32.5 mg 32.5 mg 5 mg 2.5 mg 5 mg   6/14/2024 2.80 6/14/2024 2.0-3.0 Same 32.5 mg 32.5 mg 5 mg 2.5 mg 5 mg   5/31/2024 2.10 5/31/2024 2.0-3.0 Same 32.5 mg 32.5 mg 5 mg 2.5 mg 5 mg   5/20/2024 2.50 5/20/2024 2.0-3.0 Same 32.5 mg 32.5 mg 5 mg 2.5 mg 5 mg   5/10/2024 2.30 5/9/2024 2.0-3.0 Same 32.5 mg 32.5 mg 5 mg 2.5 mg 5 mg   4/26/2024 2.20 4/26/2024 2.0-3.0 Same 32.5 mg 32.5 mg 5 mg 2.5 mg 5 mg   4/19/2024 2.20 4/18/2024 2.0-3.0 Same 32.5 mg 32.5 mg 5 mg 2.5 mg 5 mg   4/11/2024 1.80 4/11/2024 2.0-3.0 Up 30 mg 32.5 mg 5 mg 2.5 mg 5 mg   4/4/2024 1.8 4/4/2024 2.0-3.0 Up 27.5 mg 30 mg 5 mg 2.5 mg 5 mg   3/19/2024 2.1 3/19/2024 2.0-3.0 Same 27.5 mg 27.5 mg 5 mg 2.5 mg 5 mg   3/12/2024 2.00 -- 3/12/2024 2.0-3.0 2.0-3.0 Same 27.5 mg 27.5 mg 5 mg 2.5 mg 5 mg   2/27/2024 2.5 2/27/2024 2.0-3.0 Same 27.5 mg 27.5 mg 5 mg 2.5 mg 5 mg   2/20/2024 2.3 2/20/2024 2.0-3.0 Same 27.5 mg 27.5 mg 5 mg 2.5 mg 5 mg   2/13/2024 2.5 2/13/2024 2.0-3.0 Same 27.5 mg 27.5 mg 5 mg 2.5 mg 5 mg   2/7/2024 3.2 2/6/2024 2.0-3.0 Down 30 mg 27.5 mg 5 mg 2.5 mg -   1/29/2024 3.2 1/26/2024 2.0-3.0 Down 32.5 mg 30 mg 5 mg 2.5 mg 5 mg   1/18/2024 2.9 1/18/2024 2.0-3.0 Same 32.5 mg 32.5 mg 5 mg 5 mg  5 mg   1/5/2024 3.4 1/4/2024 2.0-3.0 Down 35 mg 32.5 mg 5 mg 5 mg 5 mg   12/27/2023 1.9 12/22/2023 2.0-3.0 Up 32.5 mg 35 mg 5 mg 5 mg 5 mg     Anticoagulation Monitoring Wed Thu Fri Sat   8/7/2024 5 mg 5 mg 5 mg 5 mg   7/10/2024 5 mg 5 mg 5 mg 5 mg   6/14/2024 5 mg 5 mg 5 mg 5 mg   5/31/2024 5 mg 5 mg 5 mg 5 mg   5/20/2024 5 mg 5 mg 5 mg 5 mg   5/10/2024 5 mg 5 mg 5 mg 5 mg   4/26/2024 5 mg 5 mg 5 mg 5 mg   4/19/2024 5 mg - 5 mg 5 mg   4/11/2024 5 mg 5 mg 5 mg 5 mg   4/4/2024 5 mg 5 mg 2.5 mg 5 mg   3/19/2024 2.5 mg 5 mg 2.5 mg 5 mg   3/12/2024 2.5 mg 5 mg 2.5 mg 5 mg   2/27/2024 2.5 mg 5 mg 2.5 mg 5 mg   2/20/2024 2.5 mg 5 mg 2.5 mg 5 mg   2/13/2024 2.5 mg 5 mg 2.5 mg 5 mg   2/7/2024 2.5 mg 5 mg 2.5 mg 5 mg   1/29/2024 5 mg 5 mg 2.5 mg 5 mg   1/18/2024 5 mg 5 mg 2.5 mg 5 mg   1/5/2024 5 mg 5 mg 2.5 mg 5 mg   12/27/2023 5 mg 5 mg 5 mg 5 mg      Details          Multiple values from one day are sorted in reverse-chronological order                 Phone communication conducted:     INR IN RANGE- Continue current dose of warfarin and repeat INR in 4 weeks.

## 2024-08-27 PROBLEM — R93.1 ABNORMAL ECHOCARDIOGRAM: Status: ACTIVE | Noted: 2024-08-27

## 2024-08-27 PROBLEM — I38 HEART VALVE DISEASE: Status: ACTIVE | Noted: 2024-08-27

## 2024-08-27 PROBLEM — M48.50XA COMPRESSION FRACTURE OF VERTEBRA (MULTI): Status: ACTIVE | Noted: 2023-04-12

## 2024-08-27 PROBLEM — R93.1 ABNORMAL ECHOCARDIOGRAPHY: Status: ACTIVE | Noted: 2022-10-13

## 2024-08-27 PROBLEM — L98.8 SKIN MACULE: Status: ACTIVE | Noted: 2024-08-27

## 2024-08-27 PROBLEM — M25.472 ANKLE EDEMA, BILATERAL: Status: ACTIVE | Noted: 2024-08-27

## 2024-08-27 PROBLEM — I38 VALVULAR HEART DISEASE: Status: ACTIVE | Noted: 2024-08-27

## 2024-08-27 PROBLEM — H10.30 ACUTE CONJUNCTIVITIS: Status: ACTIVE | Noted: 2024-08-27

## 2024-08-27 PROBLEM — G89.18 POSTOPERATIVE PAIN: Status: ACTIVE | Noted: 2024-08-27

## 2024-08-27 PROBLEM — K59.03 DRUG-INDUCED CONSTIPATION: Status: ACTIVE | Noted: 2024-08-27

## 2024-08-27 PROBLEM — I48.4 ATYPICAL ATRIAL FLUTTER (MULTI): Status: ACTIVE | Noted: 2024-08-27

## 2024-08-27 PROBLEM — E66.9 OBESITY: Status: ACTIVE | Noted: 2023-01-05

## 2024-08-27 PROBLEM — M79.10 MYALGIA DUE TO STATIN: Status: ACTIVE | Noted: 2024-08-27

## 2024-08-27 PROBLEM — I48.0 PAROXYSMAL ATRIAL FIBRILLATION (MULTI): Status: ACTIVE | Noted: 2024-08-27

## 2024-08-27 PROBLEM — T46.6X5A MYALGIA DUE TO STATIN: Status: ACTIVE | Noted: 2024-08-27

## 2024-08-27 PROBLEM — R73.01 IMPAIRED FASTING GLUCOSE: Status: ACTIVE | Noted: 2023-04-12

## 2024-08-27 PROBLEM — I34.0 NONRHEUMATIC MITRAL VALVE REGURGITATION: Status: ACTIVE | Noted: 2022-10-13

## 2024-08-27 PROBLEM — I11.0 HYPERTENSIVE HEART DISEASE WITH HEART FAILURE (MULTI): Status: ACTIVE | Noted: 2024-08-27

## 2024-08-27 PROBLEM — M54.30 SCIATICA: Status: ACTIVE | Noted: 2024-08-27

## 2024-08-27 PROBLEM — I50.32 CHRONIC DIASTOLIC HEART FAILURE (MULTI): Status: ACTIVE | Noted: 2022-10-13

## 2024-08-27 PROBLEM — Z95.1 HISTORY OF CORONARY ARTERY BYPASS SURGERY: Status: ACTIVE | Noted: 2023-01-05

## 2024-08-27 PROBLEM — E66.9 OBESITY WITH BODY MASS INDEX 30 OR GREATER: Status: ACTIVE | Noted: 2024-08-27

## 2024-08-27 PROBLEM — G89.18 POST-OP PAIN: Status: ACTIVE | Noted: 2024-08-27

## 2024-08-27 PROBLEM — M25.471 ANKLE EDEMA, BILATERAL: Status: ACTIVE | Noted: 2024-08-27

## 2024-08-27 PROBLEM — D64.9 ANEMIA: Status: ACTIVE | Noted: 2024-08-27

## 2024-08-27 PROBLEM — I48.19 PERSISTENT ATRIAL FIBRILLATION (MULTI): Status: ACTIVE | Noted: 2022-06-29

## 2024-08-27 PROBLEM — M54.32 SCIATICA OF LEFT SIDE: Status: ACTIVE | Noted: 2024-08-27

## 2024-08-27 PROBLEM — G47.33 OBSTRUCTIVE SLEEP APNEA SYNDROME: Status: ACTIVE | Noted: 2023-01-05

## 2024-08-27 PROBLEM — M75.40 IMPINGEMENT SYNDROME OF SHOULDER REGION: Status: ACTIVE | Noted: 2020-05-15

## 2024-08-27 PROBLEM — I34.0 MYXOMATOUS MITRAL VALVE REGURGITATION: Status: ACTIVE | Noted: 2024-08-27

## 2024-08-28 DIAGNOSIS — I48.91 ATRIAL FIBRILLATION, UNSPECIFIED TYPE (MULTI): ICD-10-CM

## 2024-09-01 NOTE — PROGRESS NOTES
Chief Complaint/Reason for Visit:   Patient is coming in today as a 6 month Cardiovascular follow up.     History Of Present Illness:    Ms. Russell is coming in today as a 6-month cardiovascular follow-up.  We have followed this patient previously for ASHD, mitral valve disease, atrial fibrillation, LV aneurysm, and dyslipidemia.  In 2018 she had a LIMA to LAD, SVG to high lateral CX, SVG to circumflex, SVG to RCA.  She also had a mitral valve repair, left atrial appendage ligation and maze procedure at the same time.    Patient comes in today feeling well.  She has had no recent hospitalizations or emergency room visits.  She denies any chest pain, pressure, palpitations, orthopnea, or edema.    Past Medical History:  She has a past medical history of Other long term (current) drug therapy and Personal history of other specified conditions.    Past Surgical History:  She has a past surgical history that includes Other surgical history (2019); Other surgical history (2018); Other surgical history (2018); Other surgical history (2018); and  section, classic (2018).      Social History:  She reports that she has never smoked. She has never used smokeless tobacco. She reports current alcohol use of about 1.0 standard drink of alcohol per week. She reports that she does not use drugs.    Family History:  Family History   Problem Relation Name Age of Onset    Hypertension Mother      Hyperlipidemia Mother      Hypertension Father      Hypercalcemia Father      Heart attack Mother's Sister      Heart attack Mother's Brother      Breast cancer Father's Sister      Heart attack Paternal Grandfather          Allergies:  Patient has no known allergies.    Medications:  Current Outpatient Medications   Medication Instructions    amLODIPine (NORVASC) 5 mg, oral, Daily    cholecalciferol (Vitamin D-3) 25 MCG (1000 UT) tablet 1 tablet, oral, Daily    fish oil concentrate (Omega-3) 120-180  "mg capsule 1 capsule, oral, 2 times daily    lisinopril 20 mg, oral, 2 times daily    LORazepam (ATIVAN) 1 mg, oral, 2 times daily PRN    metoprolol tartrate (LOPRESSOR) 50 mg, oral, 2 times daily    nystatin (Mycostatin) 100,000 unit/gram powder 1 Application, Topical, 2 times daily    rosuvastatin (CRESTOR) 40 mg, oral, Daily    warfarin (COUMADIN) 5 mg, oral, Every evening, Or as directed by clinician       Review of Systems:  Review of Systems   Constitutional: Negative. Negative for decreased appetite and malaise/fatigue.   HENT: Negative.     Eyes:  Negative for blurred vision and visual disturbance.   Cardiovascular:  Negative for chest pain, dyspnea on exertion, irregular heartbeat, leg swelling, orthopnea, palpitations and syncope.   Respiratory: Negative.  Negative for cough and shortness of breath.    Musculoskeletal:  Negative for arthritis and falls.   Gastrointestinal: Negative.    Neurological:  Negative for focal weakness and light-headedness.   Psychiatric/Behavioral:  Negative for depression and memory loss.         Vitals  Visit Vitals  /78 (BP Location: Left arm, Patient Position: Sitting, BP Cuff Size: Adult)   Pulse 83   Ht 1.448 m (4' 9\")   Wt 70.8 kg (156 lb)   LMP  (LMP Unknown)   SpO2 94%   BMI 33.76 kg/m²   OB Status Postmenopausal   Smoking Status Never   BSA 1.69 m²        Physical Exam:  Physical Exam  Constitutional:       Appearance: Normal appearance.   HENT:      Head: Normocephalic.   Eyes:      Conjunctiva/sclera: Conjunctivae normal.   Cardiovascular:      Rate and Rhythm: Normal rate and regular rhythm.      Pulses: Normal pulses.      Heart sounds: S1 normal and S2 normal. No murmur heard.     No friction rub. No gallop.   Pulmonary:      Effort: Pulmonary effort is normal.      Breath sounds: Normal breath sounds.   Abdominal:      General: Bowel sounds are normal.      Palpations: Abdomen is soft.      Tenderness: There is no abdominal tenderness.   Musculoskeletal:     "  Cervical back: Neck supple.      Right lower leg: No edema.      Left lower leg: No edema.   Skin:     General: Skin is warm and dry.   Neurological:      General: No focal deficit present.      Mental Status: She is alert and oriented to person, place, and time.   Psychiatric:         Attention and Perception: Attention normal.         Mood and Affect: Mood normal.         Last Labs:  CBC -  Lab Results   Component Value Date    WBC 9.9 11/19/2021    HGB 14.6 11/19/2021    HCT 45.3 11/19/2021    MCV 93 11/19/2021     11/19/2021     Lab Results   Component Value Date    GLUCOSE 92 02/27/2024    CALCIUM 9.4 02/27/2024     02/27/2024    K 4.5 02/27/2024    CO2 31 02/27/2024     02/27/2024    BUN 13 02/27/2024    CREATININE 0.58 02/27/2024      CMP -  Lab Results   Component Value Date    CALCIUM 9.4 02/27/2024    PHOS 3.5 12/27/2018    PROT 7.0 02/27/2024    ALBUMIN 3.9 02/27/2024    AST 29 02/27/2024    ALT 21 02/27/2024    ALKPHOS 49 02/27/2024    BILITOT 0.9 02/27/2024       LIPID PANEL -   Lab Results   Component Value Date    CHOL 142 02/27/2024    TRIG 99 02/27/2024    HDL 51.3 02/27/2024    CHHDL 2.8 02/27/2024    LDLF 72 05/22/2023    VLDL 20 02/27/2024    NHDL 91 02/27/2024       Lab Results   Component Value Date    HGBA1C 4.9 12/11/2018       Last Cardiology Tests:    Echo:10-13-22  CONCLUSIONS:   1. Left ventricular systolic function is mildly decreased with a 45% estimated ejection fraction.   2. Akinetic basal inferior segment.   3. There is moderately reduced right ventricular systolic function.   4. The left atrium is moderate to severely dilated.   5. The mitral valve is mildly thickened. The mitral valve appears to be degenerative.   6. There is moderate mitral valve stenosis.   7. There is moderate to severe mitral annular calcification.   8. Mild to moderately elevated right ventricular systolic pressure.        Lab review: I have personally reviewed the laboratory result(s)      Assessment/Plan:  ASHD: Patient has a history of CABG in 2018 which included a LIMA to LAD, SVG to high lateral LCx, SVG to circumflex, SVG to RCA.  She is currently angina class 0.  Her cardiac regimen consists of rosuvastatin, metoprolol, lisinopril, and fish oil.  She is not on aspirin due to the need for anticoagulation.    Atrial fibrillation: Patient is in normal rhythm by exam.  She is on a beta-blocker for rate control.  Patient is anticoagulated with warfarin due to an elevated [id number] score.  She is not having any abnormal bleeding or bruising and should continue on anticoagulation.  During the patient's open heart surgery she had a Maze procedure and TANVI ligation.    Valvular heart disease: Patient had a mitral valve repair in 2018 during her open heart surgery.  Echocardiogram in 2022 showed moderate mitral valve stenosis, the mitral valve appeared degenerative.  Patient will get an updated echocardiogram in October.  Patient does require antibiotics for dental work.    Hypertension: Patient's blood pressure today shows excellent control at 116/78.  She will continue on amlodipine 5 mg daily, lisinopril 20 mg twice daily, and metoprolol tartrate 50 mg twice daily.  She should also be on a heart healthy low-sodium diet.    Dyslipidemia: Lipid labs earlier this year show triglycerides 99, LDL 71.  Patient will continue on her current dose of rosuvastatin and fish oil.    Patient is agreeable with getting an updated echocardiogram.  She sees her PCP in the next couple of weeks and will likely be scheduled for her maintenance labs.  She is due for her next INR in October.  Patient's warfarin prescription was renewed.  Patient will follow-up with Dr. Stoddard in 6 months.  Patient instructed to call with any cardiovascular complaints. All questions were answered.       Dragon dictation was utilized to create this document. Quite often unanticipated grammatical, syntax,  and other interpretive errors  are inadvertently transcribed by the computer software.  Please disregard these errors.  Please excuse any errors that have escaped final proofreading.           DEVON Cox-CNP

## 2024-09-04 ENCOUNTER — ANTICOAGULATION - WARFARIN VISIT (OUTPATIENT)
Dept: CARDIOLOGY | Facility: HOSPITAL | Age: 74
End: 2024-09-04
Payer: MEDICARE

## 2024-09-04 ENCOUNTER — APPOINTMENT (OUTPATIENT)
Dept: CARDIOLOGY | Facility: HOSPITAL | Age: 74
End: 2024-09-04
Payer: MEDICARE

## 2024-09-04 DIAGNOSIS — I48.91 ATRIAL FIBRILLATION, UNSPECIFIED TYPE (MULTI): ICD-10-CM

## 2024-09-04 LAB
POC INR: 2.6 (ref 2–3)
POC PROTHROMBIN TIME: NORMAL

## 2024-09-04 PROCEDURE — 93793 ANTICOAG MGMT PT WARFARIN: CPT | Performed by: INTERNAL MEDICINE

## 2024-09-04 PROCEDURE — 85610 PROTHROMBIN TIME: CPT | Performed by: INTERNAL MEDICINE

## 2024-09-04 NOTE — PROGRESS NOTES
Anticoagulation Episode Summary       Current INR goal:  2.0-3.0   TTR:  74.1% (10.7 mo)   Next INR check:  10/2/2024   INR from last check:  2.60 (9/4/2024)   Weekly max warfarin dose:  --   Target end date:  --   INR check location:  --   Preferred lab:  --   Send INR reminders to:  DO [id number] CARD1 ACOAG    Indications    Atrial fibrillation (Multi) [I48.91]             Comments:  --             Anticoagulation Care Providers       Provider Role Specialty Phone number    Vi ALFARO Rosalio, APRN-CNP  Cardiology [phone]             More data exists     Anticoagulation Monitoring INR INR Date INR Goal Trend Last Week Total Next Week Total Sun Mon Tue 9/4/2024 2.60 9/4/2024 2.0-3.0 Same 32.5 mg 32.5 mg 5 mg 2.5 mg 5 mg   8/7/2024 2.80 8/7/2024 2.0-3.0 Same 32.5 mg 32.5 mg 5 mg 2.5 mg 5 mg   7/10/2024 2.70 7/10/2024 2.0-3.0 Same 32.5 mg 32.5 mg 5 mg 2.5 mg 5 mg   6/14/2024 2.80 6/14/2024 2.0-3.0 Same 32.5 mg 32.5 mg 5 mg 2.5 mg 5 mg   5/31/2024 2.10 5/31/2024 2.0-3.0 Same 32.5 mg 32.5 mg 5 mg 2.5 mg 5 mg   5/20/2024 2.50 5/20/2024 2.0-3.0 Same 32.5 mg 32.5 mg 5 mg 2.5 mg 5 mg   5/10/2024 2.30 5/9/2024 2.0-3.0 Same 32.5 mg 32.5 mg 5 mg 2.5 mg 5 mg   4/26/2024 2.20 4/26/2024 2.0-3.0 Same 32.5 mg 32.5 mg 5 mg 2.5 mg 5 mg   4/19/2024 2.20 4/18/2024 2.0-3.0 Same 32.5 mg 32.5 mg 5 mg 2.5 mg 5 mg   4/11/2024 1.80 4/11/2024 2.0-3.0 Up 30 mg 32.5 mg 5 mg 2.5 mg 5 mg   4/4/2024 1.8 4/4/2024 2.0-3.0 Up 27.5 mg 30 mg 5 mg 2.5 mg 5 mg   3/19/2024 2.1 3/19/2024 2.0-3.0 Same 27.5 mg 27.5 mg 5 mg 2.5 mg 5 mg   3/12/2024 2.00 -- 3/12/2024 2.0-3.0 2.0-3.0 Same 27.5 mg 27.5 mg 5 mg 2.5 mg 5 mg   2/27/2024 2.5 2/27/2024 2.0-3.0 Same 27.5 mg 27.5 mg 5 mg 2.5 mg 5 mg   2/20/2024 2.3 2/20/2024 2.0-3.0 Same 27.5 mg 27.5 mg 5 mg 2.5 mg 5 mg   2/13/2024 2.5 2/13/2024 2.0-3.0 Same 27.5 mg 27.5 mg 5 mg 2.5 mg 5 mg   2/7/2024 3.2 2/6/2024 2.0-3.0 Down 30 mg 27.5 mg 5 mg 2.5 mg -   1/29/2024 3.2 1/26/2024 2.0-3.0 Down 32.5 mg 30 mg 5 mg 2.5  mg 5 mg   1/18/2024 2.9 1/18/2024 2.0-3.0 Same 32.5 mg 32.5 mg 5 mg 5 mg 5 mg   1/5/2024 3.4 1/4/2024 2.0-3.0 Down 35 mg 32.5 mg 5 mg 5 mg 5 mg     Anticoagulation Monitoring Wed Thu Fri Sat   9/4/2024 5 mg 5 mg 5 mg 5 mg   8/7/2024 5 mg 5 mg 5 mg 5 mg   7/10/2024 5 mg 5 mg 5 mg 5 mg   6/14/2024 5 mg 5 mg 5 mg 5 mg   5/31/2024 5 mg 5 mg 5 mg 5 mg   5/20/2024 5 mg 5 mg 5 mg 5 mg   5/10/2024 5 mg 5 mg 5 mg 5 mg   4/26/2024 5 mg 5 mg 5 mg 5 mg   4/19/2024 5 mg - 5 mg 5 mg   4/11/2024 5 mg 5 mg 5 mg 5 mg   4/4/2024 5 mg 5 mg 2.5 mg 5 mg   3/19/2024 2.5 mg 5 mg 2.5 mg 5 mg   3/12/2024 2.5 mg 5 mg 2.5 mg 5 mg   2/27/2024 2.5 mg 5 mg 2.5 mg 5 mg   2/20/2024 2.5 mg 5 mg 2.5 mg 5 mg   2/13/2024 2.5 mg 5 mg 2.5 mg 5 mg   2/7/2024 2.5 mg 5 mg 2.5 mg 5 mg   1/29/2024 5 mg 5 mg 2.5 mg 5 mg   1/18/2024 5 mg 5 mg 2.5 mg 5 mg   1/5/2024 5 mg 5 mg 2.5 mg 5 mg      Details          Multiple values from one day are sorted in reverse-chronological order                 Patient was in office today for INR check results was reviewed by Lalit MONCADA    INR IN RANGE- Continue current dose of warfarin and repeat INR in 4 weeks.

## 2024-09-05 ENCOUNTER — APPOINTMENT (OUTPATIENT)
Dept: CARDIOLOGY | Facility: CLINIC | Age: 74
End: 2024-09-05
Payer: MEDICARE

## 2024-09-05 VITALS
HEART RATE: 83 BPM | WEIGHT: 156 LBS | DIASTOLIC BLOOD PRESSURE: 78 MMHG | BODY MASS INDEX: 33.66 KG/M2 | OXYGEN SATURATION: 94 % | SYSTOLIC BLOOD PRESSURE: 116 MMHG | HEIGHT: 57 IN

## 2024-09-05 DIAGNOSIS — E78.5 DYSLIPIDEMIA: ICD-10-CM

## 2024-09-05 DIAGNOSIS — I10 BENIGN ESSENTIAL HYPERTENSION: ICD-10-CM

## 2024-09-05 DIAGNOSIS — I48.91 ATRIAL FIBRILLATION, UNSPECIFIED TYPE (MULTI): ICD-10-CM

## 2024-09-05 DIAGNOSIS — I38 VALVULAR HEART DISEASE: ICD-10-CM

## 2024-09-05 DIAGNOSIS — I48.4 ATYPICAL ATRIAL FLUTTER (MULTI): Primary | ICD-10-CM

## 2024-09-05 DIAGNOSIS — I25.2 ARTERIOSCLEROSIS OF CORONARY ARTERY IN PATIENT WITH HISTORY OF MYOCARDIAL INFARCTION: ICD-10-CM

## 2024-09-05 DIAGNOSIS — I50.32 CHRONIC DIASTOLIC HEART FAILURE (MULTI): ICD-10-CM

## 2024-09-05 DIAGNOSIS — I25.10 ARTERIOSCLEROSIS OF CORONARY ARTERY IN PATIENT WITH HISTORY OF MYOCARDIAL INFARCTION: ICD-10-CM

## 2024-09-05 PROCEDURE — 1123F ACP DISCUSS/DSCN MKR DOCD: CPT | Performed by: NURSE PRACTITIONER

## 2024-09-05 PROCEDURE — 3078F DIAST BP <80 MM HG: CPT | Performed by: NURSE PRACTITIONER

## 2024-09-05 PROCEDURE — 1159F MED LIST DOCD IN RCRD: CPT | Performed by: NURSE PRACTITIONER

## 2024-09-05 PROCEDURE — 1157F ADVNC CARE PLAN IN RCRD: CPT | Performed by: NURSE PRACTITIONER

## 2024-09-05 PROCEDURE — 99214 OFFICE O/P EST MOD 30 MIN: CPT | Performed by: NURSE PRACTITIONER

## 2024-09-05 PROCEDURE — 3008F BODY MASS INDEX DOCD: CPT | Performed by: NURSE PRACTITIONER

## 2024-09-05 PROCEDURE — 3074F SYST BP LT 130 MM HG: CPT | Performed by: NURSE PRACTITIONER

## 2024-09-05 PROCEDURE — 1160F RVW MEDS BY RX/DR IN RCRD: CPT | Performed by: NURSE PRACTITIONER

## 2024-09-05 PROCEDURE — 1036F TOBACCO NON-USER: CPT | Performed by: NURSE PRACTITIONER

## 2024-09-05 RX ORDER — WARFARIN SODIUM 5 MG/1
5 TABLET ORAL EVERY EVENING
Qty: 90 TABLET | Refills: 1 | Status: SHIPPED | OUTPATIENT
Start: 2024-09-05

## 2024-09-05 ASSESSMENT — ENCOUNTER SYMPTOMS
SYNCOPE: 0
SHORTNESS OF BREATH: 0
CONSTITUTIONAL NEGATIVE: 1
GASTROINTESTINAL NEGATIVE: 1
DYSPNEA ON EXERTION: 0
PALPITATIONS: 0
DEPRESSION: 0
COUGH: 0
FOCAL WEAKNESS: 0
DECREASED APPETITE: 0
IRREGULAR HEARTBEAT: 0
LIGHT-HEADEDNESS: 0
FALLS: 0
MEMORY LOSS: 0
BLURRED VISION: 0
RESPIRATORY NEGATIVE: 1
ORTHOPNEA: 0

## 2024-09-05 NOTE — PATIENT INSTRUCTIONS
Continue on current meds  Heart healthy, low sodium diet  Mediterranean diet is recommended  Echo ordered for OCT  INR due in OCT  Labs per your PCP  Warfarin was renewed  Follow up with Dr Stoddard in 6 months

## 2024-09-20 ENCOUNTER — APPOINTMENT (OUTPATIENT)
Dept: PRIMARY CARE | Facility: CLINIC | Age: 74
End: 2024-09-20
Payer: MEDICARE

## 2024-09-27 ENCOUNTER — APPOINTMENT (OUTPATIENT)
Dept: PRIMARY CARE | Facility: CLINIC | Age: 74
End: 2024-09-27
Payer: MEDICARE

## 2024-09-30 ENCOUNTER — APPOINTMENT (OUTPATIENT)
Dept: PRIMARY CARE | Facility: CLINIC | Age: 74
End: 2024-09-30
Payer: MEDICARE

## 2024-09-30 VITALS
WEIGHT: 155.6 LBS | SYSTOLIC BLOOD PRESSURE: 130 MMHG | OXYGEN SATURATION: 90 % | BODY MASS INDEX: 33.67 KG/M2 | DIASTOLIC BLOOD PRESSURE: 68 MMHG | TEMPERATURE: 96.5 F | HEART RATE: 76 BPM

## 2024-09-30 DIAGNOSIS — I10 BENIGN ESSENTIAL HYPERTENSION: ICD-10-CM

## 2024-09-30 DIAGNOSIS — I48.0 PAROXYSMAL ATRIAL FIBRILLATION (MULTI): ICD-10-CM

## 2024-09-30 DIAGNOSIS — G47.33 OSA ON CPAP: ICD-10-CM

## 2024-09-30 DIAGNOSIS — F41.9 ANXIETY: Primary | ICD-10-CM

## 2024-09-30 DIAGNOSIS — B35.6 TINEA CRURIS: ICD-10-CM

## 2024-09-30 DIAGNOSIS — Z95.1 S/P CABG X 4: ICD-10-CM

## 2024-09-30 DIAGNOSIS — E78.00 PURE HYPERCHOLESTEROLEMIA: ICD-10-CM

## 2024-09-30 PROCEDURE — 1159F MED LIST DOCD IN RCRD: CPT | Performed by: INTERNAL MEDICINE

## 2024-09-30 PROCEDURE — 1123F ACP DISCUSS/DSCN MKR DOCD: CPT | Performed by: INTERNAL MEDICINE

## 2024-09-30 PROCEDURE — 3078F DIAST BP <80 MM HG: CPT | Performed by: INTERNAL MEDICINE

## 2024-09-30 PROCEDURE — 99213 OFFICE O/P EST LOW 20 MIN: CPT | Performed by: INTERNAL MEDICINE

## 2024-09-30 PROCEDURE — 1157F ADVNC CARE PLAN IN RCRD: CPT | Performed by: INTERNAL MEDICINE

## 2024-09-30 PROCEDURE — 3075F SYST BP GE 130 - 139MM HG: CPT | Performed by: INTERNAL MEDICINE

## 2024-09-30 PROCEDURE — G2211 COMPLEX E/M VISIT ADD ON: HCPCS | Performed by: INTERNAL MEDICINE

## 2024-09-30 RX ORDER — NYSTATIN 100000 [USP'U]/G
1 POWDER TOPICAL 2 TIMES DAILY
Qty: 60 G | Refills: 0 | Status: SHIPPED | OUTPATIENT
Start: 2024-09-30

## 2024-09-30 RX ORDER — LORAZEPAM 1 MG/1
1 TABLET ORAL 2 TIMES DAILY PRN
Qty: 60 TABLET | Refills: 2 | Status: CANCELLED | OUTPATIENT
Start: 2024-09-30 | End: 2024-12-29

## 2024-09-30 RX ORDER — LORAZEPAM 1 MG/1
1 TABLET ORAL 2 TIMES DAILY PRN
Qty: 60 TABLET | Refills: 2 | Status: SHIPPED | OUTPATIENT
Start: 2024-09-30 | End: 2024-12-29

## 2024-09-30 ASSESSMENT — ENCOUNTER SYMPTOMS
NEUROLOGICAL NEGATIVE: 1
ENDOCRINE NEGATIVE: 1
CARDIOVASCULAR NEGATIVE: 1
MUSCULOSKELETAL NEGATIVE: 1
RESPIRATORY NEGATIVE: 1
ALLERGIC/IMMUNOLOGIC NEGATIVE: 1
PSYCHIATRIC NEGATIVE: 1
GASTROINTESTINAL NEGATIVE: 1
HEMATOLOGIC/LYMPHATIC NEGATIVE: 1
EYES NEGATIVE: 1
CONSTITUTIONAL NEGATIVE: 1

## 2024-09-30 NOTE — PROGRESS NOTES
Subjective   Patient ID: Sara Russell is a 74 y.o. female who presents for 3 month follow up .  Patient presents today in follow up re:   anxiety.    Anxiety symptoms have been overall well controlled with current medication therapy.  No adverse effects of medication reported.  No new or associated issues reported.    She continues routine follow up with Cardiology for all of her cardiac issues.        Review of Systems   Constitutional: Negative.    HENT: Negative.     Eyes: Negative.    Respiratory: Negative.     Cardiovascular: Negative.    Gastrointestinal: Negative.    Endocrine: Negative.    Genitourinary: Negative.    Musculoskeletal: Negative.    Skin: Negative.    Allergic/Immunologic: Negative.    Neurological: Negative.    Hematological: Negative.    Psychiatric/Behavioral: Negative.         Objective     Blood pressure 130/68, pulse 76, temperature 35.8 °C (96.5 °F), temperature source Temporal, weight 70.6 kg (155 lb 9.6 oz), SpO2 90%.   Physical Exam  Constitutional:       Appearance: Normal appearance.   Neck:      Vascular: No carotid bruit.   Cardiovascular:      Rate and Rhythm: Normal rate and regular rhythm.      Pulses: Normal pulses.      Heart sounds: Normal heart sounds. No murmur heard.  Pulmonary:      Effort: Pulmonary effort is normal.      Breath sounds: Normal breath sounds. No wheezing or rales.   Abdominal:      General: Abdomen is flat. There is no distension.      Palpations: Abdomen is soft.      Tenderness: There is no abdominal tenderness.   Musculoskeletal:         General: Normal range of motion.      Cervical back: Normal range of motion and neck supple.   Skin:     General: Skin is warm and dry.   Neurological:      General: No focal deficit present.      Mental Status: She is alert and oriented to person, place, and time.   Psychiatric:         Mood and Affect: Mood normal.         Behavior: Behavior normal.         Assessment/Plan   Problem List Items Addressed This  Visit       Anxiety - Primary    Relevant Medications    LORazepam (Ativan) 1 mg tablet    Benign essential hypertension    Hyperlipidemia    APOLONIA on CPAP    S/P CABG x 4    Paroxysmal atrial fibrillation (Multi)     Other Visit Diagnoses       Tinea cruris        Relevant Medications    nystatin (Mycostatin) 100,000 unit/gram powder            Anxiety symptoms well controlled and doing well on current therapy.  Will continue present therapy and follow clinically.  I have personally reviewed the [facility] report.  This report is scanned into the electronic medical record.  I have considered the risks of abuse, dependence, addiction and diversion.  I believe that it is clinically appropriate to prescribe this medication.  She continues follow with Cardiology re:  heart issues and anticoagulation.  Cardiology continues to manage all these issues and she is doing well.  Pt. continues to use CPAP nightly and is tolerating well with clinical benefit.  No morning headaches or daytime somnolence reported. Advised to continue treatment and will continue to follow clinically.        Follow up in 3 months

## 2024-10-02 ENCOUNTER — ANTICOAGULATION - WARFARIN VISIT (OUTPATIENT)
Dept: CARDIOLOGY | Facility: HOSPITAL | Age: 74
End: 2024-10-02
Payer: MEDICARE

## 2024-10-02 ENCOUNTER — APPOINTMENT (OUTPATIENT)
Dept: CARDIOLOGY | Facility: HOSPITAL | Age: 74
End: 2024-10-02
Payer: MEDICARE

## 2024-10-02 DIAGNOSIS — I48.91 ATRIAL FIBRILLATION, UNSPECIFIED TYPE (MULTI): ICD-10-CM

## 2024-10-02 LAB
POC INR: 2.6 (ref 2–3)
POC PROTHROMBIN TIME: NORMAL

## 2024-10-02 PROCEDURE — 85610 PROTHROMBIN TIME: CPT | Mod: QW | Performed by: INTERNAL MEDICINE

## 2024-10-02 NOTE — PROGRESS NOTES
Anticoagulation Episode Summary       Current INR goal:  2.0-3.0   TTR:  76.2% (11.6 mo)   Next INR check:  11/6/2024   INR from last check:  2.60 (10/2/2024)   Weekly max warfarin dose:  --   Target end date:  --   INR check location:  --   Preferred lab:  --   Send INR reminders to:  DO [id number] CARD1 ACOAG    Indications    Atrial fibrillation (Multi) [I48.91]             Comments:  --             Anticoagulation Care Providers       Provider Role Specialty Phone number    Vi ALFARO Rosalio, APRN-CNP  Cardiology [phone]             More data exists     Anticoagulation Monitoring INR INR Date INR Goal Trend Last Week Total Next Week Total Sun Mon Tue   10/2/2024 2.60 10/2/2024 2.0-3.0 Same 32.5 mg 32.5 mg 5 mg 2.5 mg 5 mg   9/4/2024 2.60 9/4/2024 2.0-3.0 Same 32.5 mg 32.5 mg 5 mg 2.5 mg 5 mg   8/7/2024 2.80 8/7/2024 2.0-3.0 Same 32.5 mg 32.5 mg 5 mg 2.5 mg 5 mg   7/10/2024 2.70 7/10/2024 2.0-3.0 Same 32.5 mg 32.5 mg 5 mg 2.5 mg 5 mg   6/14/2024 2.80 6/14/2024 2.0-3.0 Same 32.5 mg 32.5 mg 5 mg 2.5 mg 5 mg   5/31/2024 2.10 5/31/2024 2.0-3.0 Same 32.5 mg 32.5 mg 5 mg 2.5 mg 5 mg   5/20/2024 2.50 5/20/2024 2.0-3.0 Same 32.5 mg 32.5 mg 5 mg 2.5 mg 5 mg   5/10/2024 2.30 5/9/2024 2.0-3.0 Same 32.5 mg 32.5 mg 5 mg 2.5 mg 5 mg   4/26/2024 2.20 4/26/2024 2.0-3.0 Same 32.5 mg 32.5 mg 5 mg 2.5 mg 5 mg   4/19/2024 2.20 4/18/2024 2.0-3.0 Same 32.5 mg 32.5 mg 5 mg 2.5 mg 5 mg   4/11/2024 1.80 4/11/2024 2.0-3.0 Up 30 mg 32.5 mg 5 mg 2.5 mg 5 mg   4/4/2024 1.8 4/4/2024 2.0-3.0 Up 27.5 mg 30 mg 5 mg 2.5 mg 5 mg   3/19/2024 2.1 3/19/2024 2.0-3.0 Same 27.5 mg 27.5 mg 5 mg 2.5 mg 5 mg   3/12/2024 2.00 -- 3/12/2024 2.0-3.0 2.0-3.0 Same 27.5 mg 27.5 mg 5 mg 2.5 mg 5 mg   2/27/2024 2.5 2/27/2024 2.0-3.0 Same 27.5 mg 27.5 mg 5 mg 2.5 mg 5 mg   2/20/2024 2.3 2/20/2024 2.0-3.0 Same 27.5 mg 27.5 mg 5 mg 2.5 mg 5 mg   2/13/2024 2.5 2/13/2024 2.0-3.0 Same 27.5 mg 27.5 mg 5 mg 2.5 mg 5 mg   2/7/2024 3.2 2/6/2024 2.0-3.0 Down 30 mg 27.5 mg 5  mg 2.5 mg -   1/29/2024 3.2 1/26/2024 2.0-3.0 Down 32.5 mg 30 mg 5 mg 2.5 mg 5 mg   1/18/2024 2.9 1/18/2024 2.0-3.0 Same 32.5 mg 32.5 mg 5 mg 5 mg 5 mg     Anticoagulation Monitoring Wed Thu Fri Sat   10/2/2024 5 mg 5 mg 5 mg 5 mg   9/4/2024 5 mg 5 mg 5 mg 5 mg   8/7/2024 5 mg 5 mg 5 mg 5 mg   7/10/2024 5 mg 5 mg 5 mg 5 mg   6/14/2024 5 mg 5 mg 5 mg 5 mg   5/31/2024 5 mg 5 mg 5 mg 5 mg   5/20/2024 5 mg 5 mg 5 mg 5 mg   5/10/2024 5 mg 5 mg 5 mg 5 mg   4/26/2024 5 mg 5 mg 5 mg 5 mg   4/19/2024 5 mg - 5 mg 5 mg   4/11/2024 5 mg 5 mg 5 mg 5 mg   4/4/2024 5 mg 5 mg 2.5 mg 5 mg   3/19/2024 2.5 mg 5 mg 2.5 mg 5 mg   3/12/2024 2.5 mg 5 mg 2.5 mg 5 mg   2/27/2024 2.5 mg 5 mg 2.5 mg 5 mg   2/20/2024 2.5 mg 5 mg 2.5 mg 5 mg   2/13/2024 2.5 mg 5 mg 2.5 mg 5 mg   2/7/2024 2.5 mg 5 mg 2.5 mg 5 mg   1/29/2024 5 mg 5 mg 2.5 mg 5 mg   1/18/2024 5 mg 5 mg 2.5 mg 5 mg      Details          Multiple values from one day are sorted in reverse-chronological order                 Patient come into the office to get her INR check Took results to Nadia MONCADA for review    INR IN RANGE- Continue current dose of warfarin and repeat INR in 4 weeks.

## 2024-10-04 ENCOUNTER — TELEPHONE (OUTPATIENT)
Dept: CARDIOLOGY | Facility: CLINIC | Age: 74
End: 2024-10-04
Payer: MEDICARE

## 2024-10-04 NOTE — TELEPHONE ENCOUNTER
Called and LVM notifying PT we received refill request, and that her refill was sent in march for 90 day's with three refills. Also stated that if she has any question's to please call the office.  Christine Mendoza MA

## 2024-10-21 ENCOUNTER — HOSPITAL ENCOUNTER (OUTPATIENT)
Dept: CARDIOLOGY | Facility: HOSPITAL | Age: 74
Discharge: HOME | End: 2024-10-21
Payer: MEDICARE

## 2024-10-21 DIAGNOSIS — I50.32 CHRONIC DIASTOLIC HEART FAILURE: ICD-10-CM

## 2024-10-21 DIAGNOSIS — I38 VALVULAR HEART DISEASE: ICD-10-CM

## 2024-10-21 PROCEDURE — 2500000004 HC RX 250 GENERAL PHARMACY W/ HCPCS (ALT 636 FOR OP/ED): Performed by: NURSE PRACTITIONER

## 2024-10-21 PROCEDURE — 93306 TTE W/DOPPLER COMPLETE: CPT | Performed by: INTERNAL MEDICINE

## 2024-10-21 PROCEDURE — 93306 TTE W/DOPPLER COMPLETE: CPT

## 2024-10-22 LAB
AORTIC VALVE MEAN GRADIENT: 5.4 MMHG
AORTIC VALVE PEAK VELOCITY: 1.56 M/S
AV PEAK GRADIENT: 9.7 MMHG
AVA (PEAK VEL): 1.73 CM2
AVA (VTI): 1.59 CM2
EJECTION FRACTION APICAL 4 CHAMBER: 43.8
EJECTION FRACTION: 45 %
LEFT ATRIUM VOLUME AREA LENGTH INDEX BSA: 57.8 ML/M2
LEFT VENTRICLE INTERNAL DIMENSION DIASTOLE: 5.06 CM (ref 3.5–6)
LEFT VENTRICULAR OUTFLOW TRACT DIAMETER: 1.58 CM
LV EJECTION FRACTION BIPLANE: 49 %
RIGHT VENTRICLE FREE WALL PEAK S': 8.85 CM/S
RIGHT VENTRICLE PEAK SYSTOLIC PRESSURE: 30 MMHG
TRICUSPID ANNULAR PLANE SYSTOLIC EXCURSION: 1.8 CM

## 2024-11-06 ENCOUNTER — CLINICAL SUPPORT (OUTPATIENT)
Dept: CARDIOLOGY | Facility: HOSPITAL | Age: 74
End: 2024-11-06
Payer: MEDICARE

## 2024-11-06 ENCOUNTER — ANTICOAGULATION - WARFARIN VISIT (OUTPATIENT)
Dept: CARDIOLOGY | Facility: HOSPITAL | Age: 74
End: 2024-11-06
Payer: MEDICARE

## 2024-11-06 DIAGNOSIS — I48.91 ATRIAL FIBRILLATION, UNSPECIFIED TYPE (MULTI): ICD-10-CM

## 2024-11-06 LAB
POC INR: 2.5 (ref 2–3)
POC PROTHROMBIN TIME: NORMAL

## 2024-11-06 PROCEDURE — 93793 ANTICOAG MGMT PT WARFARIN: CPT | Performed by: INTERNAL MEDICINE

## 2024-11-06 PROCEDURE — 85610 PROTHROMBIN TIME: CPT | Performed by: INTERNAL MEDICINE

## 2024-11-06 NOTE — PROGRESS NOTES
Anticoagulation Episode Summary       Current INR goal:  2.0-3.0   TTR:  78.3% (1.1 y)   Next INR check:  12/4/2024   INR from last check:  2.50 (11/6/2024)   Weekly max warfarin dose:  --   Target end date:  --   INR check location:  --   Preferred lab:  --   Send INR reminders to:  [facility]    Indications    Atrial fibrillation (Multi) [I48.91]             Comments:  --             Anticoagulation Care Providers       Provider Role Specialty Phone number    Vi ANGELINA Santamaria, APRN-CNP  Cardiology [phone]             More data exists     Anticoagulation Monitoring INR INR Date INR Goal Trend Last Week Total Next Week Total Sun Mon Tue 11/6/2024 2.50 11/6/2024 2.0-3.0 Same 32.5 mg 32.5 mg 5 mg 2.5 mg 5 mg   10/2/2024 2.60 10/2/2024 2.0-3.0 Same 32.5 mg 32.5 mg 5 mg 2.5 mg 5 mg   9/4/2024 2.60 9/4/2024 2.0-3.0 Same 32.5 mg 32.5 mg 5 mg 2.5 mg 5 mg   8/7/2024 2.80 8/7/2024 2.0-3.0 Same 32.5 mg 32.5 mg 5 mg 2.5 mg 5 mg   7/10/2024 2.70 7/10/2024 2.0-3.0 Same 32.5 mg 32.5 mg 5 mg 2.5 mg 5 mg   6/14/2024 2.80 6/14/2024 2.0-3.0 Same 32.5 mg 32.5 mg 5 mg 2.5 mg 5 mg   5/31/2024 2.10 5/31/2024 2.0-3.0 Same 32.5 mg 32.5 mg 5 mg 2.5 mg 5 mg   5/20/2024 2.50 5/20/2024 2.0-3.0 Same 32.5 mg 32.5 mg 5 mg 2.5 mg 5 mg   5/10/2024 2.30 5/9/2024 2.0-3.0 Same 32.5 mg 32.5 mg 5 mg 2.5 mg 5 mg   4/26/2024 2.20 4/26/2024 2.0-3.0 Same 32.5 mg 32.5 mg 5 mg 2.5 mg 5 mg   4/19/2024 2.20 4/18/2024 2.0-3.0 Same 32.5 mg 32.5 mg 5 mg 2.5 mg 5 mg   4/11/2024 1.80 4/11/2024 2.0-3.0 Up 30 mg 32.5 mg 5 mg 2.5 mg 5 mg   4/4/2024 1.8 4/4/2024 2.0-3.0 Up 27.5 mg 30 mg 5 mg 2.5 mg 5 mg   3/19/2024 2.1 3/19/2024 2.0-3.0 Same 27.5 mg 27.5 mg 5 mg 2.5 mg 5 mg   3/12/2024 2.00 -- 3/12/2024 2.0-3.0 2.0-3.0 Same 27.5 mg 27.5 mg 5 mg 2.5 mg 5 mg   2/27/2024 2.5 2/27/2024 2.0-3.0 Same 27.5 mg 27.5 mg 5 mg 2.5 mg 5 mg   2/20/2024 2.3 2/20/2024 2.0-3.0 Same 27.5 mg 27.5 mg 5 mg 2.5 mg 5 mg   2/13/2024 2.5 2/13/2024 2.0-3.0 Same 27.5 mg 27.5 mg 5  mg 2.5 mg 5 mg   2/7/2024 3.2 2/6/2024 2.0-3.0 Down 30 mg 27.5 mg 5 mg 2.5 mg -   1/29/2024 3.2 1/26/2024 2.0-3.0 Down 32.5 mg 30 mg 5 mg 2.5 mg 5 mg     Anticoagulation Monitoring Wed Thu Fri Sat   11/6/2024 5 mg 5 mg 5 mg 5 mg   10/2/2024 5 mg 5 mg 5 mg 5 mg   9/4/2024 5 mg 5 mg 5 mg 5 mg   8/7/2024 5 mg 5 mg 5 mg 5 mg   7/10/2024 5 mg 5 mg 5 mg 5 mg   6/14/2024 5 mg 5 mg 5 mg 5 mg   5/31/2024 5 mg 5 mg 5 mg 5 mg   5/20/2024 5 mg 5 mg 5 mg 5 mg   5/10/2024 5 mg 5 mg 5 mg 5 mg   4/26/2024 5 mg 5 mg 5 mg 5 mg   4/19/2024 5 mg - 5 mg 5 mg   4/11/2024 5 mg 5 mg 5 mg 5 mg   4/4/2024 5 mg 5 mg 2.5 mg 5 mg   3/19/2024 2.5 mg 5 mg 2.5 mg 5 mg   3/12/2024 2.5 mg 5 mg 2.5 mg 5 mg   2/27/2024 2.5 mg 5 mg 2.5 mg 5 mg   2/20/2024 2.5 mg 5 mg 2.5 mg 5 mg   2/13/2024 2.5 mg 5 mg 2.5 mg 5 mg   2/7/2024 2.5 mg 5 mg 2.5 mg 5 mg   1/29/2024 5 mg 5 mg 2.5 mg 5 mg      Details          Multiple values from one day are sorted in reverse-chronological order                 Patient got INR checked in lab this morning and results were reviewed by Viviane DURBIN    INR IN RANGE- Continue current dose of warfarin and repeat INR in 4 weeks.

## 2024-12-04 ENCOUNTER — ANTICOAGULATION - WARFARIN VISIT (OUTPATIENT)
Dept: CARDIOLOGY | Facility: HOSPITAL | Age: 74
End: 2024-12-04
Payer: MEDICARE

## 2024-12-04 ENCOUNTER — CLINICAL SUPPORT (OUTPATIENT)
Dept: CARDIOLOGY | Facility: HOSPITAL | Age: 74
End: 2024-12-04
Payer: MEDICARE

## 2024-12-04 DIAGNOSIS — I48.91 ATRIAL FIBRILLATION, UNSPECIFIED TYPE (MULTI): ICD-10-CM

## 2024-12-04 LAB
POC INR: 2.6 (ref 2–3)
POC PROTHROMBIN TIME: NORMAL

## 2024-12-04 PROCEDURE — 93793 ANTICOAG MGMT PT WARFARIN: CPT | Performed by: INTERNAL MEDICINE

## 2024-12-04 PROCEDURE — 85610 PROTHROMBIN TIME: CPT | Performed by: INTERNAL MEDICINE

## 2024-12-04 NOTE — PROGRESS NOTES
Anticoagulation Episode Summary       Current INR goal:  2.0-3.0   TTR:  79.8% (1.1 y)   Next INR check:  1/7/2025   INR from last check:  2.60 (12/4/2024)   Weekly max warfarin dose:  --   Target end date:  --   INR check location:  --   Preferred lab:  --   Send INR reminders to:  [facility]    Indications    Atrial fibrillation (Multi) [I48.91]             Comments:  --             Anticoagulation Care Providers       Provider Role Specialty Phone number    Vi ANGELINA Santamaria, APRN-CNP  Cardiology [phone]             More data exists     Anticoagulation Monitoring INR INR Date INR Goal Trend Last Week Total Next Week Total Sun Mon Tue 12/4/2024 2.60 12/4/2024 2.0-3.0 Same 32.5 mg 32.5 mg 5 mg 2.5 mg 5 mg   11/6/2024 2.50 11/6/2024 2.0-3.0 Same 32.5 mg 32.5 mg 5 mg 2.5 mg 5 mg   10/2/2024 2.60 10/2/2024 2.0-3.0 Same 32.5 mg 32.5 mg 5 mg 2.5 mg 5 mg   9/4/2024 2.60 9/4/2024 2.0-3.0 Same 32.5 mg 32.5 mg 5 mg 2.5 mg 5 mg   8/7/2024 2.80 8/7/2024 2.0-3.0 Same 32.5 mg 32.5 mg 5 mg 2.5 mg 5 mg   7/10/2024 2.70 7/10/2024 2.0-3.0 Same 32.5 mg 32.5 mg 5 mg 2.5 mg 5 mg   6/14/2024 2.80 6/14/2024 2.0-3.0 Same 32.5 mg 32.5 mg 5 mg 2.5 mg 5 mg   5/31/2024 2.10 5/31/2024 2.0-3.0 Same 32.5 mg 32.5 mg 5 mg 2.5 mg 5 mg   5/20/2024 2.50 5/20/2024 2.0-3.0 Same 32.5 mg 32.5 mg 5 mg 2.5 mg 5 mg   5/10/2024 2.30 5/9/2024 2.0-3.0 Same 32.5 mg 32.5 mg 5 mg 2.5 mg 5 mg   4/26/2024 2.20 4/26/2024 2.0-3.0 Same 32.5 mg 32.5 mg 5 mg 2.5 mg 5 mg   4/19/2024 2.20 4/18/2024 2.0-3.0 Same 32.5 mg 32.5 mg 5 mg 2.5 mg 5 mg   4/11/2024 1.80 4/11/2024 2.0-3.0 Up 30 mg 32.5 mg 5 mg 2.5 mg 5 mg   4/4/2024 1.8 4/4/2024 2.0-3.0 Up 27.5 mg 30 mg 5 mg 2.5 mg 5 mg   3/19/2024 2.1 3/19/2024 2.0-3.0 Same 27.5 mg 27.5 mg 5 mg 2.5 mg 5 mg   3/12/2024 2.00 -- 3/12/2024 2.0-3.0 2.0-3.0 Same 27.5 mg 27.5 mg 5 mg 2.5 mg 5 mg   2/27/2024 2.5 2/27/2024 2.0-3.0 Same 27.5 mg 27.5 mg 5 mg 2.5 mg 5 mg   2/20/2024 2.3 2/20/2024 2.0-3.0 Same 27.5 mg 27.5 mg 5  mg 2.5 mg 5 mg   2/13/2024 2.5 2/13/2024 2.0-3.0 Same 27.5 mg 27.5 mg 5 mg 2.5 mg 5 mg   2/7/2024 3.2 2/6/2024 2.0-3.0 Down 30 mg 27.5 mg 5 mg 2.5 mg -     Anticoagulation Monitoring Wed Thu Fri Sat   12/4/2024 5 mg 5 mg 5 mg 5 mg   11/6/2024 5 mg 5 mg 5 mg 5 mg   10/2/2024 5 mg 5 mg 5 mg 5 mg   9/4/2024 5 mg 5 mg 5 mg 5 mg   8/7/2024 5 mg 5 mg 5 mg 5 mg   7/10/2024 5 mg 5 mg 5 mg 5 mg   6/14/2024 5 mg 5 mg 5 mg 5 mg   5/31/2024 5 mg 5 mg 5 mg 5 mg   5/20/2024 5 mg 5 mg 5 mg 5 mg   5/10/2024 5 mg 5 mg 5 mg 5 mg   4/26/2024 5 mg 5 mg 5 mg 5 mg   4/19/2024 5 mg - 5 mg 5 mg   4/11/2024 5 mg 5 mg 5 mg 5 mg   4/4/2024 5 mg 5 mg 2.5 mg 5 mg   3/19/2024 2.5 mg 5 mg 2.5 mg 5 mg   3/12/2024 2.5 mg 5 mg 2.5 mg 5 mg   2/27/2024 2.5 mg 5 mg 2.5 mg 5 mg   2/20/2024 2.5 mg 5 mg 2.5 mg 5 mg   2/13/2024 2.5 mg 5 mg 2.5 mg 5 mg   2/7/2024 2.5 mg 5 mg 2.5 mg 5 mg      Details          Multiple values from one day are sorted in reverse-chronological order                 Phone communication conducted: Patient come into office for INR check today took results to Ramya RAMSAY R.N     INR IN RANGE- Continue current dose of warfarin and repeat INR in 4 weeks.

## 2024-12-30 ENCOUNTER — APPOINTMENT (OUTPATIENT)
Dept: PRIMARY CARE | Facility: CLINIC | Age: 74
End: 2024-12-30
Payer: MEDICARE

## 2024-12-30 VITALS
WEIGHT: 156.4 LBS | OXYGEN SATURATION: 96 % | SYSTOLIC BLOOD PRESSURE: 122 MMHG | BODY MASS INDEX: 33.84 KG/M2 | DIASTOLIC BLOOD PRESSURE: 80 MMHG | TEMPERATURE: 96.9 F | HEART RATE: 73 BPM

## 2024-12-30 DIAGNOSIS — I48.0 PAROXYSMAL ATRIAL FIBRILLATION (MULTI): ICD-10-CM

## 2024-12-30 DIAGNOSIS — Z95.1 S/P CABG X 4: ICD-10-CM

## 2024-12-30 DIAGNOSIS — F41.9 ANXIETY: Primary | ICD-10-CM

## 2024-12-30 DIAGNOSIS — E78.00 PURE HYPERCHOLESTEROLEMIA: ICD-10-CM

## 2024-12-30 DIAGNOSIS — G47.33 OSA ON CPAP: ICD-10-CM

## 2024-12-30 DIAGNOSIS — I10 BENIGN ESSENTIAL HYPERTENSION: ICD-10-CM

## 2024-12-30 PROCEDURE — G2211 COMPLEX E/M VISIT ADD ON: HCPCS | Performed by: INTERNAL MEDICINE

## 2024-12-30 PROCEDURE — 1157F ADVNC CARE PLAN IN RCRD: CPT | Performed by: INTERNAL MEDICINE

## 2024-12-30 PROCEDURE — 3074F SYST BP LT 130 MM HG: CPT | Performed by: INTERNAL MEDICINE

## 2024-12-30 PROCEDURE — 99213 OFFICE O/P EST LOW 20 MIN: CPT | Performed by: INTERNAL MEDICINE

## 2024-12-30 PROCEDURE — 1123F ACP DISCUSS/DSCN MKR DOCD: CPT | Performed by: INTERNAL MEDICINE

## 2024-12-30 PROCEDURE — 3079F DIAST BP 80-89 MM HG: CPT | Performed by: INTERNAL MEDICINE

## 2024-12-30 RX ORDER — LORAZEPAM 1 MG/1
1 TABLET ORAL 2 TIMES DAILY PRN
Qty: 60 TABLET | Refills: 2 | Status: SHIPPED | OUTPATIENT
Start: 2024-12-30 | End: 2025-03-30

## 2024-12-30 ASSESSMENT — ENCOUNTER SYMPTOMS
PSYCHIATRIC NEGATIVE: 1
CONSTITUTIONAL NEGATIVE: 1
GASTROINTESTINAL NEGATIVE: 1
ALLERGIC/IMMUNOLOGIC NEGATIVE: 1
ENDOCRINE NEGATIVE: 1
EYES NEGATIVE: 1
CARDIOVASCULAR NEGATIVE: 1
HEMATOLOGIC/LYMPHATIC NEGATIVE: 1
MUSCULOSKELETAL NEGATIVE: 1
NEUROLOGICAL NEGATIVE: 1
RESPIRATORY NEGATIVE: 1

## 2024-12-30 NOTE — PROGRESS NOTES
Subjective   Patient ID: Sara Russell is a 74 y.o. female who presents for 3 month follow up.  Patient presents today in follow up re:   anxiety.    Anxiety symptoms have been overall well controlled with current medication therapy.  No adverse effects of medication reported.  No new or associated issues reported.    She continues routine follow up with Cardiology for all of her cardiac issues.        Review of Systems   Constitutional: Negative.    HENT: Negative.     Eyes: Negative.    Respiratory: Negative.     Cardiovascular: Negative.    Gastrointestinal: Negative.    Endocrine: Negative.    Genitourinary: Negative.    Musculoskeletal: Negative.    Skin: Negative.    Allergic/Immunologic: Negative.    Neurological: Negative.    Hematological: Negative.    Psychiatric/Behavioral: Negative.         Objective     Blood pressure 122/80, pulse 73, temperature 36.1 °C (96.9 °F), temperature source Temporal, weight 70.9 kg (156 lb 6.4 oz), SpO2 96%.   Physical Exam  Constitutional:       Appearance: Normal appearance.   Neck:      Vascular: No carotid bruit.   Cardiovascular:      Rate and Rhythm: Normal rate and regular rhythm.      Pulses: Normal pulses.      Heart sounds: Normal heart sounds. No murmur heard.  Pulmonary:      Effort: Pulmonary effort is normal.      Breath sounds: Normal breath sounds. No wheezing or rales.   Abdominal:      General: Abdomen is flat. There is no distension.      Palpations: Abdomen is soft.      Tenderness: There is no abdominal tenderness.   Musculoskeletal:         General: Normal range of motion.      Cervical back: Normal range of motion and neck supple.   Skin:     General: Skin is warm and dry.   Neurological:      General: No focal deficit present.      Mental Status: She is alert and oriented to person, place, and time.   Psychiatric:         Mood and Affect: Mood normal.         Behavior: Behavior normal.         Assessment/Plan   Problem List Items Addressed This  Visit       Anxiety - Primary    Relevant Medications    LORazepam (Ativan) 1 mg tablet    Benign essential hypertension    Hyperlipidemia    APOLONIA on CPAP    S/P CABG x 4    Paroxysmal atrial fibrillation (Multi)       Anxiety symptoms well controlled and doing well on current therapy.  Will continue present therapy and follow clinically.  I have personally reviewed the [facility] report.  This report is scanned into the electronic medical record.  I have considered the risks of abuse, dependence, addiction and diversion.  I believe that it is clinically appropriate to prescribe this medication.  She continues follow with Cardiology re:  heart issues and anticoagulation.  Cardiology continues to manage all these issues and she is doing well.  Pt. continues to use CPAP nightly and is tolerating well with clinical benefit.  No morning headaches or daytime somnolence reported. Advised to continue treatment and will continue to follow clinically.        Follow up in 3 months

## 2025-01-07 ENCOUNTER — CLINICAL SUPPORT (OUTPATIENT)
Dept: CARDIOLOGY | Facility: HOSPITAL | Age: 75
End: 2025-01-07
Payer: MEDICARE

## 2025-01-07 ENCOUNTER — ANTICOAGULATION - WARFARIN VISIT (OUTPATIENT)
Dept: CARDIOLOGY | Facility: HOSPITAL | Age: 75
End: 2025-01-07

## 2025-01-07 DIAGNOSIS — I48.91 ATRIAL FIBRILLATION, UNSPECIFIED TYPE (MULTI): ICD-10-CM

## 2025-01-07 LAB
POC INR: 3.1 (ref 2–3)
POC PROTHROMBIN TIME: ABNORMAL

## 2025-01-07 PROCEDURE — 93793 ANTICOAG MGMT PT WARFARIN: CPT | Performed by: INTERNAL MEDICINE

## 2025-01-07 PROCEDURE — 85610 PROTHROMBIN TIME: CPT | Mod: QW | Performed by: INTERNAL MEDICINE

## 2025-01-07 PROCEDURE — 85610 PROTHROMBIN TIME: CPT | Performed by: INTERNAL MEDICINE

## 2025-01-07 NOTE — PROGRESS NOTES
Anticoagulation Episode Summary       Current INR goal:  2.0-3.0   TTR:  79.8% (1.2 y)   Next INR check:  1/14/2025   INR from last check:  3.10 (1/7/2025)   Weekly max warfarin dose:  --   Target end date:  --   INR check location:  --   Preferred lab:  --   Send INR reminders to:  [facility]    Indications    Atrial fibrillation (Multi) [I48.91]             Comments:  --             Anticoagulation Care Providers       Provider Role Specialty Phone number    Vi ANGELINA Santamaria, APRN-CNP  Cardiology [phone]             More data exists     Anticoagulation Monitoring INR INR Date INR Goal Trend Last Week Total Next Week Total Sun Mon Tue 1/7/2025 3.10 1/7/2025 2.0-3.0 Same 32.5 mg 32.5 mg 5 mg 2.5 mg 5 mg   12/4/2024 2.60 12/4/2024 2.0-3.0 Same 32.5 mg 32.5 mg 5 mg 2.5 mg 5 mg   11/6/2024 2.50 11/6/2024 2.0-3.0 Same 32.5 mg 32.5 mg 5 mg 2.5 mg 5 mg   10/2/2024 2.60 10/2/2024 2.0-3.0 Same 32.5 mg 32.5 mg 5 mg 2.5 mg 5 mg   9/4/2024 2.60 9/4/2024 2.0-3.0 Same 32.5 mg 32.5 mg 5 mg 2.5 mg 5 mg   8/7/2024 2.80 8/7/2024 2.0-3.0 Same 32.5 mg 32.5 mg 5 mg 2.5 mg 5 mg   7/10/2024 2.70 7/10/2024 2.0-3.0 Same 32.5 mg 32.5 mg 5 mg 2.5 mg 5 mg   6/14/2024 2.80 6/14/2024 2.0-3.0 Same 32.5 mg 32.5 mg 5 mg 2.5 mg 5 mg   5/31/2024 2.10 5/31/2024 2.0-3.0 Same 32.5 mg 32.5 mg 5 mg 2.5 mg 5 mg   5/20/2024 2.50 5/20/2024 2.0-3.0 Same 32.5 mg 32.5 mg 5 mg 2.5 mg 5 mg   5/10/2024 2.30 5/9/2024 2.0-3.0 Same 32.5 mg 32.5 mg 5 mg 2.5 mg 5 mg   4/26/2024 2.20 4/26/2024 2.0-3.0 Same 32.5 mg 32.5 mg 5 mg 2.5 mg 5 mg   4/19/2024 2.20 4/18/2024 2.0-3.0 Same 32.5 mg 32.5 mg 5 mg 2.5 mg 5 mg   4/11/2024 1.80 4/11/2024 2.0-3.0 Up 30 mg 32.5 mg 5 mg 2.5 mg 5 mg   4/4/2024 1.8 4/4/2024 2.0-3.0 Up 27.5 mg 30 mg 5 mg 2.5 mg 5 mg   3/19/2024 2.1 3/19/2024 2.0-3.0 Same 27.5 mg 27.5 mg 5 mg 2.5 mg 5 mg   3/12/2024 2.00 -- 3/12/2024 2.0-3.0 2.0-3.0 Same 27.5 mg 27.5 mg 5 mg 2.5 mg 5 mg   2/27/2024 2.5 2/27/2024 2.0-3.0 Same 27.5 mg 27.5 mg 5  mg 2.5 mg 5 mg   2/20/2024 2.3 2/20/2024 2.0-3.0 Same 27.5 mg 27.5 mg 5 mg 2.5 mg 5 mg   2/13/2024 2.5 2/13/2024 2.0-3.0 Same 27.5 mg 27.5 mg 5 mg 2.5 mg 5 mg     Anticoagulation Monitoring Wed Thu Fri Sat   1/7/2025 5 mg 5 mg 5 mg 5 mg   12/4/2024 5 mg 5 mg 5 mg 5 mg   11/6/2024 5 mg 5 mg 5 mg 5 mg   10/2/2024 5 mg 5 mg 5 mg 5 mg   9/4/2024 5 mg 5 mg 5 mg 5 mg   8/7/2024 5 mg 5 mg 5 mg 5 mg   7/10/2024 5 mg 5 mg 5 mg 5 mg   6/14/2024 5 mg 5 mg 5 mg 5 mg   5/31/2024 5 mg 5 mg 5 mg 5 mg   5/20/2024 5 mg 5 mg 5 mg 5 mg   5/10/2024 5 mg 5 mg 5 mg 5 mg   4/26/2024 5 mg 5 mg 5 mg 5 mg   4/19/2024 5 mg - 5 mg 5 mg   4/11/2024 5 mg 5 mg 5 mg 5 mg   4/4/2024 5 mg 5 mg 2.5 mg 5 mg   3/19/2024 2.5 mg 5 mg 2.5 mg 5 mg   3/12/2024 2.5 mg 5 mg 2.5 mg 5 mg   2/27/2024 2.5 mg 5 mg 2.5 mg 5 mg   2/20/2024 2.5 mg 5 mg 2.5 mg 5 mg   2/13/2024 2.5 mg 5 mg 2.5 mg 5 mg      Details          Multiple values from one day are sorted in reverse-chronological order                 Phone communication conducted: [facility] CB , PT TO STAY ON SAME DOSE DUE TO MINIMAL DOSE CHANGE    INR OUT OF RANGE- Change warfarin dose to SAME and repeat INR in 1W week/s.

## 2025-01-14 ENCOUNTER — CLINICAL SUPPORT (OUTPATIENT)
Dept: CARDIOLOGY | Facility: HOSPITAL | Age: 75
End: 2025-01-14
Payer: MEDICARE

## 2025-01-14 ENCOUNTER — ANTICOAGULATION - WARFARIN VISIT (OUTPATIENT)
Dept: CARDIOLOGY | Facility: HOSPITAL | Age: 75
End: 2025-01-14
Payer: MEDICARE

## 2025-01-14 DIAGNOSIS — I48.91 ATRIAL FIBRILLATION, UNSPECIFIED TYPE (MULTI): ICD-10-CM

## 2025-01-14 LAB
POC INR: 3.4 (ref 2–3)
POC PROTHROMBIN TIME: ABNORMAL

## 2025-01-14 PROCEDURE — 85610 PROTHROMBIN TIME: CPT | Mod: QW | Performed by: INTERNAL MEDICINE

## 2025-01-14 PROCEDURE — 85610 PROTHROMBIN TIME: CPT | Performed by: INTERNAL MEDICINE

## 2025-01-14 PROCEDURE — 93793 ANTICOAG MGMT PT WARFARIN: CPT | Performed by: INTERNAL MEDICINE

## 2025-01-14 NOTE — PROGRESS NOTES
Anticoagulation Episode Summary       Current INR goal:  2.0-3.0   TTR:  78.6% (1.2 y)   Next INR check:  1/21/2025   INR from last check:  3.40 (1/14/2025)   Weekly max warfarin dose:  --   Target end date:  --   INR check location:  --   Preferred lab:  --   Send INR reminders to:  [facility]    Indications    Atrial fibrillation (Multi) [I48.91]             Comments:  --             Anticoagulation Care Providers       Provider Role Specialty Phone number    Vi ANGELINA Santamaria, APRN-CNP  Cardiology [phone]             More data exists     Anticoagulation Monitoring INR INR Date INR Goal Trend Last Week Total Next Week Total Sun Mon Tue 1/14/2025 3.40 1/14/2025 2.0-3.0 Down 32.5 mg 30 mg 5 mg 2.5 mg 5 mg   1/7/2025 3.10 1/7/2025 2.0-3.0 Same 32.5 mg 32.5 mg 5 mg 2.5 mg 5 mg   12/4/2024 2.60 12/4/2024 2.0-3.0 Same 32.5 mg 32.5 mg 5 mg 2.5 mg 5 mg   11/6/2024 2.50 11/6/2024 2.0-3.0 Same 32.5 mg 32.5 mg 5 mg 2.5 mg 5 mg   10/2/2024 2.60 10/2/2024 2.0-3.0 Same 32.5 mg 32.5 mg 5 mg 2.5 mg 5 mg   9/4/2024 2.60 9/4/2024 2.0-3.0 Same 32.5 mg 32.5 mg 5 mg 2.5 mg 5 mg   8/7/2024 2.80 8/7/2024 2.0-3.0 Same 32.5 mg 32.5 mg 5 mg 2.5 mg 5 mg   7/10/2024 2.70 7/10/2024 2.0-3.0 Same 32.5 mg 32.5 mg 5 mg 2.5 mg 5 mg   6/14/2024 2.80 6/14/2024 2.0-3.0 Same 32.5 mg 32.5 mg 5 mg 2.5 mg 5 mg   5/31/2024 2.10 5/31/2024 2.0-3.0 Same 32.5 mg 32.5 mg 5 mg 2.5 mg 5 mg   5/20/2024 2.50 5/20/2024 2.0-3.0 Same 32.5 mg 32.5 mg 5 mg 2.5 mg 5 mg   5/10/2024 2.30 5/9/2024 2.0-3.0 Same 32.5 mg 32.5 mg 5 mg 2.5 mg 5 mg   4/26/2024 2.20 4/26/2024 2.0-3.0 Same 32.5 mg 32.5 mg 5 mg 2.5 mg 5 mg   4/19/2024 2.20 4/18/2024 2.0-3.0 Same 32.5 mg 32.5 mg 5 mg 2.5 mg 5 mg   4/11/2024 1.80 4/11/2024 2.0-3.0 Up 30 mg 32.5 mg 5 mg 2.5 mg 5 mg   4/4/2024 1.8 4/4/2024 2.0-3.0 Up 27.5 mg 30 mg 5 mg 2.5 mg 5 mg   3/19/2024 2.1 3/19/2024 2.0-3.0 Same 27.5 mg 27.5 mg 5 mg 2.5 mg 5 mg   3/12/2024 2.00 -- 3/12/2024 2.0-3.0 2.0-3.0 Same 27.5 mg 27.5 mg 5  mg 2.5 mg 5 mg   2/27/2024 2.5 2/27/2024 2.0-3.0 Same 27.5 mg 27.5 mg 5 mg 2.5 mg 5 mg   2/20/2024 2.3 2/20/2024 2.0-3.0 Same 27.5 mg 27.5 mg 5 mg 2.5 mg 5 mg     Anticoagulation Monitoring Wed Thu Fri Sat   1/14/2025 5 mg 2.5 mg 5 mg 5 mg   1/7/2025 5 mg 5 mg 5 mg 5 mg   12/4/2024 5 mg 5 mg 5 mg 5 mg   11/6/2024 5 mg 5 mg 5 mg 5 mg   10/2/2024 5 mg 5 mg 5 mg 5 mg   9/4/2024 5 mg 5 mg 5 mg 5 mg   8/7/2024 5 mg 5 mg 5 mg 5 mg   7/10/2024 5 mg 5 mg 5 mg 5 mg   6/14/2024 5 mg 5 mg 5 mg 5 mg   5/31/2024 5 mg 5 mg 5 mg 5 mg   5/20/2024 5 mg 5 mg 5 mg 5 mg   5/10/2024 5 mg 5 mg 5 mg 5 mg   4/26/2024 5 mg 5 mg 5 mg 5 mg   4/19/2024 5 mg - 5 mg 5 mg   4/11/2024 5 mg 5 mg 5 mg 5 mg   4/4/2024 5 mg 5 mg 2.5 mg 5 mg   3/19/2024 2.5 mg 5 mg 2.5 mg 5 mg   3/12/2024 2.5 mg 5 mg 2.5 mg 5 mg   2/27/2024 2.5 mg 5 mg 2.5 mg 5 mg   2/20/2024 2.5 mg 5 mg 2.5 mg 5 mg      Details          Multiple values from one day are sorted in reverse-chronological order                 Phone communication conducted: [facility] HMM    INR OUT OF RANGE- Change warfarin dose to 2.5 MG M, TH, 5 MG OTHERS and repeat INR in 1W week/s.

## 2025-01-20 ENCOUNTER — TELEPHONE (OUTPATIENT)
Dept: CARDIOLOGY | Facility: HOSPITAL | Age: 75
End: 2025-01-20

## 2025-01-20 ENCOUNTER — CLINICAL SUPPORT (OUTPATIENT)
Dept: CARDIOLOGY | Facility: HOSPITAL | Age: 75
End: 2025-01-20
Payer: MEDICARE

## 2025-01-20 ENCOUNTER — ANTICOAGULATION - WARFARIN VISIT (OUTPATIENT)
Dept: CARDIOLOGY | Facility: HOSPITAL | Age: 75
End: 2025-01-20

## 2025-01-20 DIAGNOSIS — I10 BENIGN ESSENTIAL HYPERTENSION: ICD-10-CM

## 2025-01-20 DIAGNOSIS — I48.91 ATRIAL FIBRILLATION, UNSPECIFIED TYPE (MULTI): ICD-10-CM

## 2025-01-20 LAB
POC INR: 2.9 (ref 2–3)
POC PROTHROMBIN TIME: NORMAL

## 2025-01-20 PROCEDURE — 85610 PROTHROMBIN TIME: CPT | Mod: QW | Performed by: INTERNAL MEDICINE

## 2025-01-20 PROCEDURE — 93793 ANTICOAG MGMT PT WARFARIN: CPT | Performed by: INTERNAL MEDICINE

## 2025-01-20 RX ORDER — AMLODIPINE BESYLATE 5 MG/1
5 TABLET ORAL DAILY
Qty: 90 TABLET | Refills: 3 | Status: SHIPPED | OUTPATIENT
Start: 2025-01-20 | End: 2026-01-20

## 2025-01-20 NOTE — PROGRESS NOTES
Anticoagulation Episode Summary       Current INR goal:  2.0-3.0   TTR:  77.8% (1.3 y)   Next INR check:  1/27/2025   INR from last check:  2.90 (1/20/2025)   Weekly max warfarin dose:  --   Target end date:  --   INR check location:  --   Preferred lab:  --   Send INR reminders to:  [facility]    Indications    Atrial fibrillation (Multi) [I48.91]             Comments:  --             Anticoagulation Care Providers       Provider Role Specialty Phone number    Vi ALFARO Rosalio, APRN-CNP  Cardiology [phone]             More data exists     Anticoagulation Monitoring INR INR Date INR Goal Trend Last Week Total Next Week Total Sun Mon Tue 1/20/2025 2.90 1/20/2025 2.0-3.0 Same 30 mg 30 mg 5 mg 2.5 mg 5 mg   1/14/2025 3.40 1/14/2025 2.0-3.0 Down 32.5 mg 30 mg 5 mg 2.5 mg 5 mg   1/7/2025 3.10 1/7/2025 2.0-3.0 Same 32.5 mg 32.5 mg 5 mg 2.5 mg 5 mg   12/4/2024 2.60 12/4/2024 2.0-3.0 Same 32.5 mg 32.5 mg 5 mg 2.5 mg 5 mg   11/6/2024 2.50 11/6/2024 2.0-3.0 Same 32.5 mg 32.5 mg 5 mg 2.5 mg 5 mg   10/2/2024 2.60 10/2/2024 2.0-3.0 Same 32.5 mg 32.5 mg 5 mg 2.5 mg 5 mg   9/4/2024 2.60 9/4/2024 2.0-3.0 Same 32.5 mg 32.5 mg 5 mg 2.5 mg 5 mg   8/7/2024 2.80 8/7/2024 2.0-3.0 Same 32.5 mg 32.5 mg 5 mg 2.5 mg 5 mg   7/10/2024 2.70 7/10/2024 2.0-3.0 Same 32.5 mg 32.5 mg 5 mg 2.5 mg 5 mg   6/14/2024 2.80 6/14/2024 2.0-3.0 Same 32.5 mg 32.5 mg 5 mg 2.5 mg 5 mg   5/31/2024 2.10 5/31/2024 2.0-3.0 Same 32.5 mg 32.5 mg 5 mg 2.5 mg 5 mg   5/20/2024 2.50 5/20/2024 2.0-3.0 Same 32.5 mg 32.5 mg 5 mg 2.5 mg 5 mg   5/10/2024 2.30 5/9/2024 2.0-3.0 Same 32.5 mg 32.5 mg 5 mg 2.5 mg 5 mg   4/26/2024 2.20 4/26/2024 2.0-3.0 Same 32.5 mg 32.5 mg 5 mg 2.5 mg 5 mg   4/19/2024 2.20 4/18/2024 2.0-3.0 Same 32.5 mg 32.5 mg 5 mg 2.5 mg 5 mg   4/11/2024 1.80 4/11/2024 2.0-3.0 Up 30 mg 32.5 mg 5 mg 2.5 mg 5 mg   4/4/2024 1.8 4/4/2024 2.0-3.0 Up 27.5 mg 30 mg 5 mg 2.5 mg 5 mg   3/19/2024 2.1 3/19/2024 2.0-3.0 Same 27.5 mg 27.5 mg 5 mg 2.5 mg 5 mg    3/12/2024 2.00 -- 3/12/2024 2.0-3.0 2.0-3.0 Same 27.5 mg 27.5 mg 5 mg 2.5 mg 5 mg   2/27/2024 2.5 2/27/2024 2.0-3.0 Same 27.5 mg 27.5 mg 5 mg 2.5 mg 5 mg     Anticoagulation Monitoring Wed Thu Fri Sat   1/20/2025 5 mg 2.5 mg 5 mg 5 mg   1/14/2025 5 mg 2.5 mg 5 mg 5 mg   1/7/2025 5 mg 5 mg 5 mg 5 mg   12/4/2024 5 mg 5 mg 5 mg 5 mg   11/6/2024 5 mg 5 mg 5 mg 5 mg   10/2/2024 5 mg 5 mg 5 mg 5 mg   9/4/2024 5 mg 5 mg 5 mg 5 mg   8/7/2024 5 mg 5 mg 5 mg 5 mg   7/10/2024 5 mg 5 mg 5 mg 5 mg   6/14/2024 5 mg 5 mg 5 mg 5 mg   5/31/2024 5 mg 5 mg 5 mg 5 mg   5/20/2024 5 mg 5 mg 5 mg 5 mg   5/10/2024 5 mg 5 mg 5 mg 5 mg   4/26/2024 5 mg 5 mg 5 mg 5 mg   4/19/2024 5 mg - 5 mg 5 mg   4/11/2024 5 mg 5 mg 5 mg 5 mg   4/4/2024 5 mg 5 mg 2.5 mg 5 mg   3/19/2024 2.5 mg 5 mg 2.5 mg 5 mg   3/12/2024 2.5 mg 5 mg 2.5 mg 5 mg   2/27/2024 2.5 mg 5 mg 2.5 mg 5 mg      Details          Multiple values from one day are sorted in reverse-chronological order                 In office INR results tooken to Sari DURBIN    INR IN RANGE- Continue current dose of warfarin and repeat INR in 1 week.

## 2025-01-20 NOTE — TELEPHONE ENCOUNTER
Patient called, in requested a refill of Amlodipine 5mg  to [facility]  in [place]. Patient is almost out.

## 2025-01-21 ENCOUNTER — APPOINTMENT (OUTPATIENT)
Dept: CARDIOLOGY | Facility: HOSPITAL | Age: 75
End: 2025-01-21
Payer: MEDICARE

## 2025-01-27 ENCOUNTER — APPOINTMENT (OUTPATIENT)
Dept: CARDIOLOGY | Facility: HOSPITAL | Age: 75
End: 2025-01-27
Payer: MEDICARE

## 2025-02-03 ENCOUNTER — APPOINTMENT (OUTPATIENT)
Dept: CARDIOLOGY | Facility: HOSPITAL | Age: 75
End: 2025-02-03
Payer: MEDICARE

## 2025-02-03 ENCOUNTER — ANTICOAGULATION - WARFARIN VISIT (OUTPATIENT)
Dept: CARDIOLOGY | Facility: HOSPITAL | Age: 75
End: 2025-02-03
Payer: MEDICARE

## 2025-02-03 DIAGNOSIS — I48.91 ATRIAL FIBRILLATION, UNSPECIFIED TYPE (MULTI): ICD-10-CM

## 2025-02-03 LAB
POC INR: 2.7 (ref 2–3)
POC PROTHROMBIN TIME: NORMAL

## 2025-02-03 PROCEDURE — 85610 PROTHROMBIN TIME: CPT | Performed by: INTERNAL MEDICINE

## 2025-02-03 PROCEDURE — 85610 PROTHROMBIN TIME: CPT | Mod: QW | Performed by: INTERNAL MEDICINE

## 2025-02-03 PROCEDURE — 93793 ANTICOAG MGMT PT WARFARIN: CPT | Performed by: INTERNAL MEDICINE

## 2025-02-03 NOTE — PROGRESS NOTES
Anticoagulation Episode Summary       Current INR goal:  2.0-3.0   TTR:  78.5% (1.3 y)   Next INR check:  2/10/2025   INR from last check:  2.70 (2/3/2025)   Weekly max warfarin dose:  --   Target end date:  --   INR check location:  --   Preferred lab:  --   Send INR reminders to:  [facility]    Indications    Atrial fibrillation (Multi) [I48.91]             Comments:  --             Anticoagulation Care Providers       Provider Role Specialty Phone number    Vi ANGELINA Santamaria, APRN-CNP  Cardiology [phone]             More data exists     Anticoagulation Monitoring INR INR Date INR Goal Trend Last Week Total Next Week Total Sun Mon Tue   2/3/2025 2.70 2/3/2025 2.0-3.0 Same 30 mg 30 mg 5 mg 2.5 mg 5 mg   1/20/2025 2.90 1/20/2025 2.0-3.0 Same 30 mg 30 mg 5 mg 2.5 mg 5 mg   1/14/2025 3.40 1/14/2025 2.0-3.0 Down 32.5 mg 30 mg 5 mg 2.5 mg 5 mg   1/7/2025 3.10 1/7/2025 2.0-3.0 Same 32.5 mg 32.5 mg 5 mg 2.5 mg 5 mg   12/4/2024 2.60 12/4/2024 2.0-3.0 Same 32.5 mg 32.5 mg 5 mg 2.5 mg 5 mg   11/6/2024 2.50 11/6/2024 2.0-3.0 Same 32.5 mg 32.5 mg 5 mg 2.5 mg 5 mg   10/2/2024 2.60 10/2/2024 2.0-3.0 Same 32.5 mg 32.5 mg 5 mg 2.5 mg 5 mg   9/4/2024 2.60 9/4/2024 2.0-3.0 Same 32.5 mg 32.5 mg 5 mg 2.5 mg 5 mg   8/7/2024 2.80 8/7/2024 2.0-3.0 Same 32.5 mg 32.5 mg 5 mg 2.5 mg 5 mg   7/10/2024 2.70 7/10/2024 2.0-3.0 Same 32.5 mg 32.5 mg 5 mg 2.5 mg 5 mg   6/14/2024 2.80 6/14/2024 2.0-3.0 Same 32.5 mg 32.5 mg 5 mg 2.5 mg 5 mg   5/31/2024 2.10 5/31/2024 2.0-3.0 Same 32.5 mg 32.5 mg 5 mg 2.5 mg 5 mg   5/20/2024 2.50 5/20/2024 2.0-3.0 Same 32.5 mg 32.5 mg 5 mg 2.5 mg 5 mg   5/10/2024 2.30 5/9/2024 2.0-3.0 Same 32.5 mg 32.5 mg 5 mg 2.5 mg 5 mg   4/26/2024 2.20 4/26/2024 2.0-3.0 Same 32.5 mg 32.5 mg 5 mg 2.5 mg 5 mg   4/19/2024 2.20 4/18/2024 2.0-3.0 Same 32.5 mg 32.5 mg 5 mg 2.5 mg 5 mg   4/11/2024 1.80 4/11/2024 2.0-3.0 Up 30 mg 32.5 mg 5 mg 2.5 mg 5 mg   4/4/2024 1.8 4/4/2024 2.0-3.0 Up 27.5 mg 30 mg 5 mg 2.5 mg 5 mg    3/19/2024 2.1 3/19/2024 2.0-3.0 Same 27.5 mg 27.5 mg 5 mg 2.5 mg 5 mg   3/12/2024 2.00 -- 3/12/2024 2.0-3.0 2.0-3.0 Same 27.5 mg 27.5 mg 5 mg 2.5 mg 5 mg     Anticoagulation Monitoring Wed Thu Fri Sat   2/3/2025 5 mg 2.5 mg 5 mg 5 mg   1/20/2025 5 mg 2.5 mg 5 mg 5 mg   1/14/2025 5 mg 2.5 mg 5 mg 5 mg   1/7/2025 5 mg 5 mg 5 mg 5 mg   12/4/2024 5 mg 5 mg 5 mg 5 mg   11/6/2024 5 mg 5 mg 5 mg 5 mg   10/2/2024 5 mg 5 mg 5 mg 5 mg   9/4/2024 5 mg 5 mg 5 mg 5 mg   8/7/2024 5 mg 5 mg 5 mg 5 mg   7/10/2024 5 mg 5 mg 5 mg 5 mg   6/14/2024 5 mg 5 mg 5 mg 5 mg   5/31/2024 5 mg 5 mg 5 mg 5 mg   5/20/2024 5 mg 5 mg 5 mg 5 mg   5/10/2024 5 mg 5 mg 5 mg 5 mg   4/26/2024 5 mg 5 mg 5 mg 5 mg   4/19/2024 5 mg - 5 mg 5 mg   4/11/2024 5 mg 5 mg 5 mg 5 mg   4/4/2024 5 mg 5 mg 2.5 mg 5 mg   3/19/2024 2.5 mg 5 mg 2.5 mg 5 mg   3/12/2024 2.5 mg 5 mg 2.5 mg 5 mg      Details          Multiple values from one day are sorted in reverse-chronological order                 Phone communication conducted: [facility] HMM    INR IN RANGE- Continue current dose of warfarin and repeat INR in 1W.

## 2025-02-10 ENCOUNTER — ANTICOAGULATION - WARFARIN VISIT (OUTPATIENT)
Dept: CARDIOLOGY | Facility: HOSPITAL | Age: 75
End: 2025-02-10
Payer: MEDICARE

## 2025-02-10 ENCOUNTER — APPOINTMENT (OUTPATIENT)
Dept: CARDIOLOGY | Facility: HOSPITAL | Age: 75
End: 2025-02-10
Payer: MEDICARE

## 2025-02-10 DIAGNOSIS — I48.91 ATRIAL FIBRILLATION, UNSPECIFIED TYPE (MULTI): ICD-10-CM

## 2025-02-10 LAB
POC INR: 2.9 (ref 2–3)
POC PROTHROMBIN TIME: NORMAL

## 2025-02-10 PROCEDURE — 85610 PROTHROMBIN TIME: CPT | Performed by: INTERNAL MEDICINE

## 2025-02-10 PROCEDURE — 93793 ANTICOAG MGMT PT WARFARIN: CPT | Performed by: INTERNAL MEDICINE

## 2025-02-10 PROCEDURE — 85610 PROTHROMBIN TIME: CPT | Mod: QW | Performed by: INTERNAL MEDICINE

## 2025-02-24 ENCOUNTER — ANTICOAGULATION - WARFARIN VISIT (OUTPATIENT)
Dept: CARDIOLOGY | Facility: HOSPITAL | Age: 75
End: 2025-02-24
Payer: MEDICARE

## 2025-02-24 ENCOUNTER — APPOINTMENT (OUTPATIENT)
Dept: CARDIOLOGY | Facility: HOSPITAL | Age: 75
End: 2025-02-24
Payer: MEDICARE

## 2025-02-24 DIAGNOSIS — I48.91 ATRIAL FIBRILLATION, UNSPECIFIED TYPE (MULTI): ICD-10-CM

## 2025-02-24 LAB
POC INR: 2.7 (ref 2–3)
POC PROTHROMBIN TIME: NORMAL

## 2025-02-24 PROCEDURE — 85610 PROTHROMBIN TIME: CPT | Mod: QW | Performed by: INTERNAL MEDICINE

## 2025-02-24 PROCEDURE — 93793 ANTICOAG MGMT PT WARFARIN: CPT | Performed by: INTERNAL MEDICINE

## 2025-02-24 PROCEDURE — 85610 PROTHROMBIN TIME: CPT | Performed by: INTERNAL MEDICINE

## 2025-02-24 NOTE — PROGRESS NOTES
Anticoagulation Episode Summary       Current INR goal:  2.0-3.0   TTR:  79.4% (1.4 y)   Next INR check:  3/10/2025   INR from last check:  2.70 (2/24/2025)   Weekly max warfarin dose:  --   Target end date:  --   INR check location:  --   Preferred lab:  --   Send INR reminders to:  [facility]    Indications    Atrial fibrillation (Multi) [I48.91]             Comments:  --             Anticoagulation Care Providers       Provider Role Specialty Phone number    Vi ANGELINA Santamaria, APRN-CNP  Cardiology [phone]             More data exists     Anticoagulation Monitoring INR INR Date INR Goal Trend Last Week Total Next Week Total Sun Mon Tue 2/24/2025 2.70 2/24/2025 2.0-3.0 Same 30 mg 30 mg 5 mg 2.5 mg 5 mg   2/10/2025 2.90 2/10/2025 2.0-3.0 Same 30 mg 30 mg 5 mg 2.5 mg 5 mg   2/3/2025 2.70 2/3/2025 2.0-3.0 Same 30 mg 30 mg 5 mg 2.5 mg 5 mg   1/20/2025 2.90 1/20/2025 2.0-3.0 Same 30 mg 30 mg 5 mg 2.5 mg 5 mg   1/14/2025 3.40 1/14/2025 2.0-3.0 Down 32.5 mg 30 mg 5 mg 2.5 mg 5 mg   1/7/2025 3.10 1/7/2025 2.0-3.0 Same 32.5 mg 32.5 mg 5 mg 2.5 mg 5 mg   12/4/2024 2.60 12/4/2024 2.0-3.0 Same 32.5 mg 32.5 mg 5 mg 2.5 mg 5 mg   11/6/2024 2.50 11/6/2024 2.0-3.0 Same 32.5 mg 32.5 mg 5 mg 2.5 mg 5 mg   10/2/2024 2.60 10/2/2024 2.0-3.0 Same 32.5 mg 32.5 mg 5 mg 2.5 mg 5 mg   9/4/2024 2.60 9/4/2024 2.0-3.0 Same 32.5 mg 32.5 mg 5 mg 2.5 mg 5 mg   8/7/2024 2.80 8/7/2024 2.0-3.0 Same 32.5 mg 32.5 mg 5 mg 2.5 mg 5 mg   7/10/2024 2.70 7/10/2024 2.0-3.0 Same 32.5 mg 32.5 mg 5 mg 2.5 mg 5 mg   6/14/2024 2.80 6/14/2024 2.0-3.0 Same 32.5 mg 32.5 mg 5 mg 2.5 mg 5 mg   5/31/2024 2.10 5/31/2024 2.0-3.0 Same 32.5 mg 32.5 mg 5 mg 2.5 mg 5 mg   5/20/2024 2.50 5/20/2024 2.0-3.0 Same 32.5 mg 32.5 mg 5 mg 2.5 mg 5 mg   5/10/2024 2.30 5/9/2024 2.0-3.0 Same 32.5 mg 32.5 mg 5 mg 2.5 mg 5 mg   4/26/2024 2.20 4/26/2024 2.0-3.0 Same 32.5 mg 32.5 mg 5 mg 2.5 mg 5 mg   4/19/2024 2.20 4/18/2024 2.0-3.0 Same 32.5 mg 32.5 mg 5 mg 2.5 mg 5 mg    4/11/2024 1.80 4/11/2024 2.0-3.0 Up 30 mg 32.5 mg 5 mg 2.5 mg 5 mg   4/4/2024 1.8 4/4/2024 2.0-3.0 Up 27.5 mg 30 mg 5 mg 2.5 mg 5 mg     Anticoagulation Monitoring Wed Thu Fri Sat   2/24/2025 5 mg 2.5 mg 5 mg 5 mg   2/10/2025 5 mg 2.5 mg 5 mg 5 mg   2/3/2025 5 mg 2.5 mg 5 mg 5 mg   1/20/2025 5 mg 2.5 mg 5 mg 5 mg   1/14/2025 5 mg 2.5 mg 5 mg 5 mg   1/7/2025 5 mg 5 mg 5 mg 5 mg   12/4/2024 5 mg 5 mg 5 mg 5 mg   11/6/2024 5 mg 5 mg 5 mg 5 mg   10/2/2024 5 mg 5 mg 5 mg 5 mg   9/4/2024 5 mg 5 mg 5 mg 5 mg   8/7/2024 5 mg 5 mg 5 mg 5 mg   7/10/2024 5 mg 5 mg 5 mg 5 mg   6/14/2024 5 mg 5 mg 5 mg 5 mg   5/31/2024 5 mg 5 mg 5 mg 5 mg   5/20/2024 5 mg 5 mg 5 mg 5 mg   5/10/2024 5 mg 5 mg 5 mg 5 mg   4/26/2024 5 mg 5 mg 5 mg 5 mg   4/19/2024 5 mg - 5 mg 5 mg   4/11/2024 5 mg 5 mg 5 mg 5 mg   4/4/2024 5 mg 5 mg 2.5 mg 5 mg         Phone communication conducted: CWP EP     INR IN RANGE- Continue current dose of warfarin and repeat INR in 2W.

## 2025-03-10 ENCOUNTER — CLINICAL SUPPORT (OUTPATIENT)
Dept: CARDIOLOGY | Facility: HOSPITAL | Age: 75
End: 2025-03-10
Payer: MEDICARE

## 2025-03-10 ENCOUNTER — ANTICOAGULATION - WARFARIN VISIT (OUTPATIENT)
Dept: CARDIOLOGY | Facility: HOSPITAL | Age: 75
End: 2025-03-10

## 2025-03-10 DIAGNOSIS — I48.91 ATRIAL FIBRILLATION, UNSPECIFIED TYPE (MULTI): ICD-10-CM

## 2025-03-10 LAB
POC INR: 2.2 (ref 2–3)
POC PROTHROMBIN TIME: NORMAL

## 2025-03-10 PROCEDURE — 93793 ANTICOAG MGMT PT WARFARIN: CPT | Performed by: INTERNAL MEDICINE

## 2025-03-10 PROCEDURE — 85610 PROTHROMBIN TIME: CPT | Mod: QW | Performed by: INTERNAL MEDICINE

## 2025-03-10 PROCEDURE — 85610 PROTHROMBIN TIME: CPT | Performed by: INTERNAL MEDICINE

## 2025-03-10 NOTE — PROGRESS NOTES
Anticoagulation Episode Summary       Current INR goal:  2.0-3.0   TTR:  80.0% (1.4 y)   Next INR check:  3/31/2025   INR from last check:  2.20 (3/10/2025)   Weekly max warfarin dose:  --   Target end date:  --   INR check location:  --   Preferred lab:  --   Send INR reminders to:  [facility]    Indications    Atrial fibrillation (Multi) [I48.91]             Comments:  --             Anticoagulation Care Providers       Provider Role Specialty Phone number    Vi ANGELINA Santamaria, APRN-CNP  Cardiology [phone]             More data exists     Anticoagulation Monitoring INR INR Date INR Goal Trend Last Week Total Next Week Total Sun Mon Tue   3/10/2025 2.20 3/10/2025 2.0-3.0 Same 30 mg 30 mg 5 mg 2.5 mg 5 mg   2/24/2025 2.70 2/24/2025 2.0-3.0 Same 30 mg 30 mg 5 mg 2.5 mg 5 mg   2/10/2025 2.90 2/10/2025 2.0-3.0 Same 30 mg 30 mg 5 mg 2.5 mg 5 mg   2/3/2025 2.70 2/3/2025 2.0-3.0 Same 30 mg 30 mg 5 mg 2.5 mg 5 mg   1/20/2025 2.90 1/20/2025 2.0-3.0 Same 30 mg 30 mg 5 mg 2.5 mg 5 mg   1/14/2025 3.40 1/14/2025 2.0-3.0 Down 32.5 mg 30 mg 5 mg 2.5 mg 5 mg   1/7/2025 3.10 1/7/2025 2.0-3.0 Same 32.5 mg 32.5 mg 5 mg 2.5 mg 5 mg   12/4/2024 2.60 12/4/2024 2.0-3.0 Same 32.5 mg 32.5 mg 5 mg 2.5 mg 5 mg   11/6/2024 2.50 11/6/2024 2.0-3.0 Same 32.5 mg 32.5 mg 5 mg 2.5 mg 5 mg   10/2/2024 2.60 10/2/2024 2.0-3.0 Same 32.5 mg 32.5 mg 5 mg 2.5 mg 5 mg   9/4/2024 2.60 9/4/2024 2.0-3.0 Same 32.5 mg 32.5 mg 5 mg 2.5 mg 5 mg   8/7/2024 2.80 8/7/2024 2.0-3.0 Same 32.5 mg 32.5 mg 5 mg 2.5 mg 5 mg   7/10/2024 2.70 7/10/2024 2.0-3.0 Same 32.5 mg 32.5 mg 5 mg 2.5 mg 5 mg   6/14/2024 2.80 6/14/2024 2.0-3.0 Same 32.5 mg 32.5 mg 5 mg 2.5 mg 5 mg   5/31/2024 2.10 5/31/2024 2.0-3.0 Same 32.5 mg 32.5 mg 5 mg 2.5 mg 5 mg   5/20/2024 2.50 5/20/2024 2.0-3.0 Same 32.5 mg 32.5 mg 5 mg 2.5 mg 5 mg   5/10/2024 2.30 5/9/2024 2.0-3.0 Same 32.5 mg 32.5 mg 5 mg 2.5 mg 5 mg   4/26/2024 2.20 4/26/2024 2.0-3.0 Same 32.5 mg 32.5 mg 5 mg 2.5 mg 5 mg    4/19/2024 2.20 4/18/2024 2.0-3.0 Same 32.5 mg 32.5 mg 5 mg 2.5 mg 5 mg   4/11/2024 1.80 4/11/2024 2.0-3.0 Up 30 mg 32.5 mg 5 mg 2.5 mg 5 mg       Anticoagulation Monitoring Wed Thu Fri Sat   3/10/2025 5 mg 2.5 mg 5 mg 5 mg   2/24/2025 5 mg 2.5 mg 5 mg 5 mg   2/10/2025 5 mg 2.5 mg 5 mg 5 mg   2/3/2025 5 mg 2.5 mg 5 mg 5 mg   1/20/2025 5 mg 2.5 mg 5 mg 5 mg   1/14/2025 5 mg 2.5 mg 5 mg 5 mg   1/7/2025 5 mg 5 mg 5 mg 5 mg   12/4/2024 5 mg 5 mg 5 mg 5 mg   11/6/2024 5 mg 5 mg 5 mg 5 mg   10/2/2024 5 mg 5 mg 5 mg 5 mg   9/4/2024 5 mg 5 mg 5 mg 5 mg   8/7/2024 5 mg 5 mg 5 mg 5 mg   7/10/2024 5 mg 5 mg 5 mg 5 mg   6/14/2024 5 mg 5 mg 5 mg 5 mg   5/31/2024 5 mg 5 mg 5 mg 5 mg   5/20/2024 5 mg 5 mg 5 mg 5 mg   5/10/2024 5 mg 5 mg 5 mg 5 mg   4/26/2024 5 mg 5 mg 5 mg 5 mg   4/19/2024 5 mg - 5 mg 5 mg   4/11/2024 5 mg 5 mg 5 mg 5 mg         Phone communication conducted: CWP CB    INR IN RANGE- Continue current dose of warfarin and repeat INR in 3W.

## 2025-03-11 ENCOUNTER — TELEPHONE (OUTPATIENT)
Dept: CARDIOLOGY | Facility: HOSPITAL | Age: 75
End: 2025-03-11

## 2025-03-11 ENCOUNTER — TELEPHONE (OUTPATIENT)
Dept: PRIMARY CARE | Facility: CLINIC | Age: 75
End: 2025-03-11

## 2025-03-11 ENCOUNTER — OFFICE VISIT (OUTPATIENT)
Dept: CARDIOLOGY | Facility: HOSPITAL | Age: 75
End: 2025-03-11
Payer: MEDICARE

## 2025-03-11 VITALS
SYSTOLIC BLOOD PRESSURE: 138 MMHG | BODY MASS INDEX: 32.84 KG/M2 | HEIGHT: 57 IN | HEART RATE: 87 BPM | DIASTOLIC BLOOD PRESSURE: 76 MMHG | WEIGHT: 152.2 LBS

## 2025-03-11 DIAGNOSIS — I48.91 ATRIAL FIBRILLATION, UNSPECIFIED TYPE (MULTI): ICD-10-CM

## 2025-03-11 DIAGNOSIS — I25.10 ARTERIOSCLEROSIS OF CORONARY ARTERY IN PATIENT WITH HISTORY OF MYOCARDIAL INFARCTION: ICD-10-CM

## 2025-03-11 DIAGNOSIS — I25.2 ARTERIOSCLEROSIS OF CORONARY ARTERY IN PATIENT WITH HISTORY OF MYOCARDIAL INFARCTION: ICD-10-CM

## 2025-03-11 DIAGNOSIS — I10 BENIGN ESSENTIAL HYPERTENSION: ICD-10-CM

## 2025-03-11 LAB
ATRIAL RATE: 87 BPM
P AXIS: 27 DEGREES
P OFFSET: 185 MS
P ONSET: 130 MS
PR INTERVAL: 178 MS
Q ONSET: 219 MS
QRS COUNT: 14 BEATS
QRS DURATION: 108 MS
QT INTERVAL: 396 MS
QTC CALCULATION(BAZETT): 476 MS
QTC FREDERICIA: 448 MS
R AXIS: 25 DEGREES
T AXIS: 32 DEGREES
T OFFSET: 417 MS
VENTRICULAR RATE: 87 BPM

## 2025-03-11 PROCEDURE — 1157F ADVNC CARE PLAN IN RCRD: CPT | Performed by: INTERNAL MEDICINE

## 2025-03-11 PROCEDURE — 1123F ACP DISCUSS/DSCN MKR DOCD: CPT | Performed by: INTERNAL MEDICINE

## 2025-03-11 PROCEDURE — 3078F DIAST BP <80 MM HG: CPT | Performed by: INTERNAL MEDICINE

## 2025-03-11 PROCEDURE — 3075F SYST BP GE 130 - 139MM HG: CPT | Performed by: INTERNAL MEDICINE

## 2025-03-11 PROCEDURE — 93010 ELECTROCARDIOGRAM REPORT: CPT | Performed by: INTERNAL MEDICINE

## 2025-03-11 PROCEDURE — 99214 OFFICE O/P EST MOD 30 MIN: CPT | Mod: 25 | Performed by: INTERNAL MEDICINE

## 2025-03-11 PROCEDURE — 3008F BODY MASS INDEX DOCD: CPT | Performed by: INTERNAL MEDICINE

## 2025-03-11 PROCEDURE — 99214 OFFICE O/P EST MOD 30 MIN: CPT | Performed by: INTERNAL MEDICINE

## 2025-03-11 PROCEDURE — 1159F MED LIST DOCD IN RCRD: CPT | Performed by: INTERNAL MEDICINE

## 2025-03-11 PROCEDURE — 93005 ELECTROCARDIOGRAM TRACING: CPT | Performed by: INTERNAL MEDICINE

## 2025-03-11 RX ORDER — AMLODIPINE BESYLATE 5 MG/1
5 TABLET ORAL DAILY
Qty: 90 TABLET | Refills: 3 | Status: SHIPPED | OUTPATIENT
Start: 2025-03-11 | End: 2026-03-11

## 2025-03-11 RX ORDER — LISINOPRIL 20 MG/1
20 TABLET ORAL 2 TIMES DAILY
Qty: 180 TABLET | Refills: 3 | Status: SHIPPED | OUTPATIENT
Start: 2025-03-11 | End: 2026-03-11

## 2025-03-11 RX ORDER — METOPROLOL TARTRATE 50 MG/1
50 TABLET ORAL 2 TIMES DAILY
Qty: 180 TABLET | Refills: 3 | Status: SHIPPED | OUTPATIENT
Start: 2025-03-11 | End: 2026-03-11

## 2025-03-11 RX ORDER — WARFARIN SODIUM 5 MG/1
5 TABLET ORAL EVERY EVENING
Qty: 90 TABLET | Refills: 1 | Status: SHIPPED | OUTPATIENT
Start: 2025-03-11

## 2025-03-11 RX ORDER — ROSUVASTATIN CALCIUM 40 MG/1
20 TABLET, COATED ORAL DAILY
Qty: 45 TABLET | Refills: 3 | Status: SHIPPED | OUTPATIENT
Start: 2025-03-11 | End: 2026-03-11

## 2025-03-11 NOTE — TELEPHONE ENCOUNTER
The patient was in for her cardiologist appointment with Dr. Stoddard today.     Dr. Stoddard advised the patient that her blood work was over due. The patient was to call her pcp's office to have blood work ordered before her appointment.       Last office visit: 12/30/2024  Next office visit: 3/27/2025

## 2025-03-11 NOTE — PROGRESS NOTES
Chief Complaint:   No chief complaint on file.     History Of Present Illness:    Sara Russell is a 74 y.o. female presenting for 6-month follow-up.  H/o CAD s/p CABG (L-LAD, S-high lat Cx, S-Cx, S-RCA 2018), nonrheumatic mitral regurgitation s/p MVR 2018, atrial fibrillation (TANVI ligation and MAZE procedure) s/p RFA 2021, LV aneurysm, DL, APOLONIA on CPAP.     Last seen by AILEEN Santamaria in 9/2024; she had no CV complaints at the time. Pt updated her TTE 10/2024 which showed mild aortic valve regurgitation, LVEF 45%. Reassured Pt this is at baseline.     S/p MVR 2018 and asymptomatic. Denies chest pain/pressure, SOB, dyspnea on exertion, LE edema, LH/dizziness, presyncope.  Compliant with home cardiac medications as prescribed. She would like refills today. Pt states she has an appointment with PCP this month but unsure of the date - she will call them to find out.    She is trying to avoid saturated fats. She has an airfryer but likes fried pickles, and occasionally goes out to dinner.     Review Of Systems:  The remainder of the review of systems was obtained, and was negative as pertains to the chief complaint.    CV testing and labs reviewed:    Labs 3/2025: INR 2.20  2/2024  CMP  K 4.5  BUN 13  crea 0.58 ast 29, alt 21   Lipid labs 2/2024:    HDL 51.3  LDL 71  TG 99    TTE 10/2024:   1. The left ventricular systolic function is mildly decreased, with a visually estimated ejection fraction of 45%.   2. Abnormal wall motion.   3. Left ventricular diastolic filling was indeterminate.   4. Upper septum 1.6 cm. Akinetic and aneurysmal basal and mid inferior wall.   5. There is moderately reduced right ventricular systolic function.   6. The left atrium is severely dilated.   7. Mean MV gradient is 9 mm Hg at HR of 77 bpm.   8. Mild aortic valve regurgitation.    TTE 10/2022   EF 45% WMA mild to moderate conc LVH ,left atrium moderate to severely dilated , moderate reduced RV function, trace to mild aortic valve  regurg, moderate mitral valve stenosis, status post mitral annular  ring repair, trace mitral valve regurg, RVSP mild to moderately elevated at 49.     [facility]  10/2018  Triple vessel CAD with proximal LAD involvement ,severe MR     Prior history:  She was initially diagnosed with atrial fibrillation in 2017. She underwent cardioversion but did not hold sinus rhythm for more than a few weeks. Imaging suggested RCA territory LV aneurysm. She later established care with me in 2018. She was having shortness of breath with exertion. I referred her for heart catheterization showing severe multivessel coronary disease and mitral regurgitation. She underwent open heart surgery in December 2018 with LIMA to the LAD, vein graft to the high lateral circumflex, vein graft to the lateral circumflex, and vein graft to the distal RCA. She had mitral valve repair, left atrial appendage ligation, and maze procedure. As an outpatient postoperatively, she was in atrial flutter and underwent cardioversion. She has since maintained sinus rhythm and has been chronically anticoagulated. She tells me that she gets a left upper arm pain and a left leg pain over the past couple of weeks. She went to her chiropractor who diagnosed her with sciatica and felt that her rotator cuff was causing her shoulder pain. He referred her to an orthopedic surgeon that she is seeing later this week. She does not have chest pain, shortness of breath, orthopnea, PND, palpitations, edema, or lightheadedness. No syncope. She is exercising on a stationary cycle 20-30 minutes per day. She says she feels well on the cycle. She has been watching her calories and trying to lose weight. She has noticed that her blood pressure and heart rate have increased over the last week. Her blood pressures are running in the low 140s over  and her heart rate is running in the low 100s to 112 bpm. She has obstructive sleep apnea syndrome and is wearing CPAP. She is taking her  medications as prescribed. She is tolerating the change in statin to rosuvastatin without myalgias as she had previously on atorvastatin.    Last Recorded Vitals:  There were no vitals filed for this visit.    Past Medical History:  She has a past medical history of Other long term (current) drug therapy and Personal history of other specified conditions.    Past Surgical History:  She has a past surgical history that includes Other surgical history (2019); Other surgical history (2018); Other surgical history (2018); Other surgical history (2018); and  section, classic (2018).      Social History:  She reports that she has never smoked. She has never used smokeless tobacco. She reports current alcohol use of about 1.0 standard drink of alcohol per week. She reports that she does not use drugs.    Family History:  Family History   Problem Relation Name Age of Onset    Hypertension Mother      Hyperlipidemia Mother      Hypertension Father      Hypercalcemia Father      Heart attack Mother's Sister      Heart attack Mother's Brother      Breast cancer Father's Sister      Heart attack Paternal Grandfather          Allergies:  Patient has no known allergies.    Outpatient Medications:  Current Outpatient Medications   Medication Instructions    amLODIPine (NORVASC) 5 mg, oral, Daily    ascorbic acid (VITAMIN C ORAL) Take by mouth.    cholecalciferol (Vitamin D-3) 25 MCG (1000 UT) tablet 1 tablet, Daily    fish oil concentrate (Omega-3) 120-180 mg capsule 1 capsule, 2 times daily    lisinopril 20 mg, oral, 2 times daily    LORazepam (ATIVAN) 1 mg, oral, 2 times daily PRN    MAGNESIUM ORAL Daily    metoprolol tartrate (LOPRESSOR) 50 mg, oral, 2 times daily    nystatin (Mycostatin) 100,000 unit/gram powder 1 Application, Topical, 2 times daily    rosuvastatin (CRESTOR) 40 mg, oral, Daily    warfarin (COUMADIN) 5 mg, oral, Every evening, Or as directed by clinician       Physical  Exam:  Physical Exam  HENT:      Head: Normocephalic.      Nose: Nose normal.      Mouth/Throat:      Mouth: Mucous membranes are moist.   Cardiovascular:      Comments: Normal S1, S2  No significant diastolic murmur heard at apex  Pulmonary:      Effort: Pulmonary effort is normal.   Abdominal:      Palpations: Abdomen is soft.   Musculoskeletal:         General: Normal range of motion.      Cervical back: Normal range of motion.      Comments: Trivial ankle swelling   Skin:     General: Skin is warm and dry.   Neurological:      Mental Status: She is alert.   Psychiatric:         Mood and Affect: Mood normal.        Last Labs:  CBC -  Lab Results   Component Value Date    WBC 9.9 11/19/2021    HGB 14.6 11/19/2021    HCT 45.3 11/19/2021    MCV 93 11/19/2021     11/19/2021       CMP -  Lab Results   Component Value Date    CALCIUM 9.4 02/27/2024    PHOS 3.5 12/27/2018    PROT 7.0 02/27/2024    ALBUMIN 3.9 02/27/2024    AST 29 02/27/2024    ALT 21 02/27/2024    ALKPHOS 49 02/27/2024    BILITOT 0.9 02/27/2024       LIPID PANEL -   Lab Results   Component Value Date    CHOL 142 02/27/2024    TRIG 99 02/27/2024    HDL 51.3 02/27/2024    CHHDL 2.8 02/27/2024    LDLF 72 05/22/2023    VLDL 20 02/27/2024    NHDL 91 02/27/2024       RENAL FUNCTION PANEL -   Lab Results   Component Value Date    GLUCOSE 92 02/27/2024     02/27/2024    K 4.5 02/27/2024     02/27/2024    CO2 31 02/27/2024    ANIONGAP 8 (L) 02/27/2024    BUN 13 02/27/2024    CREATININE 0.58 02/27/2024    CALCIUM 9.4 02/27/2024    PHOS 3.5 12/27/2018    ALBUMIN 3.9 02/27/2024        Lab Results   Component Value Date    HGBA1C 4.9 12/11/2018     Assessment/Plan   ASHD:   S/p CABG in 2018 which included a LIMA to LAD, SVG to high lateral LCx, SVG to circumflex, SVG to RCA. H/o LV aneurysm. Currently angina class 0.   -not on ASA due to jantoven  -continue rosuvastatin 40mg, half tab daily - last lipids checked 2/24 with LDL 72     Afib: s/p  MAZE/TANVI ligation at time of CABG, and RFA 2021 with improvement in sx  -continue metoprolol tart 50mg bid  -jantoven goal INR 2-3  -anticoagulated with warfarin due to an elevated [id number] score   -continue regular INR checks    Mitral regurgitations s/p MVR 2018 - VALENCIA 9/21 with trace MR  TTE 10/2024 showed mild aortic valve regurg.  Pt is asymptomatic today.   -serial monitoring     DL:   Lipid labs 2/2024, LDL 71 - almost at goal  -continue rosuvastatin 20mg (taking 1/2 pill)  -she will try lifestyle modifications   -advised Pt to rpt FLP with PCP soon     APOLONIA:   -urged ongoing CPAP compliance    Scribe Attestation  By signing my name below, I, Jes Wade, Scribe   attest that this documentation has been prepared under the direction and in the presence of Cayetano Stoddard MD.

## 2025-03-11 NOTE — TELEPHONE ENCOUNTER
RN called pt at this time regarding lab work, RN advised to have her PCP get orders at this time for her bloodwork.

## 2025-03-11 NOTE — PATIENT INSTRUCTIONS
Thanks for following up in office today.    1)  We reviewed your echocardiogram in 10/2024: your heart function is at baseline.    2)  Please discuss with your PCP about getting a fasting lipid profile and basic metabolic panel.     3)  Please continue your cardiac medications as prescribed. I refilled your medications today.     4)  I advise you to reduce saturated or trans fats in your diet. You can visit [email or web address] for more diet modifications for your cholesterol.    Follow up with Evangelina Santamaria in 6 months  If you have any questions, please call us at [phone]

## 2025-03-13 ENCOUNTER — TELEPHONE (OUTPATIENT)
Dept: CARDIOLOGY | Facility: HOSPITAL | Age: 75
End: 2025-03-13
Payer: MEDICARE

## 2025-03-13 NOTE — TELEPHONE ENCOUNTER
PT called and stated that  will not do the blood testing that needs done due to  being the primary who prescribes PT's medications. States that she is in no hurry to have this done as she is just relaying this message to us.     Told PT I would route this to the nurse Viviane to see where they would like to go from here.    Normal for race

## 2025-03-27 ENCOUNTER — APPOINTMENT (OUTPATIENT)
Dept: PRIMARY CARE | Facility: CLINIC | Age: 75
End: 2025-03-27
Payer: MEDICARE

## 2025-03-27 VITALS
DIASTOLIC BLOOD PRESSURE: 70 MMHG | HEART RATE: 83 BPM | TEMPERATURE: 97.6 F | WEIGHT: 154.4 LBS | SYSTOLIC BLOOD PRESSURE: 139 MMHG | BODY MASS INDEX: 33.41 KG/M2 | OXYGEN SATURATION: 96 %

## 2025-03-27 DIAGNOSIS — E78.00 PURE HYPERCHOLESTEROLEMIA: ICD-10-CM

## 2025-03-27 DIAGNOSIS — I50.32 CHRONIC DIASTOLIC CONGESTIVE HEART FAILURE: ICD-10-CM

## 2025-03-27 DIAGNOSIS — Z12.31 SCREENING MAMMOGRAM FOR BREAST CANCER: ICD-10-CM

## 2025-03-27 DIAGNOSIS — I10 BENIGN ESSENTIAL HYPERTENSION: ICD-10-CM

## 2025-03-27 DIAGNOSIS — F41.9 ANXIETY: ICD-10-CM

## 2025-03-27 DIAGNOSIS — Z00.00 MEDICARE ANNUAL WELLNESS VISIT, SUBSEQUENT: Primary | ICD-10-CM

## 2025-03-27 DIAGNOSIS — I48.0 PAROXYSMAL ATRIAL FIBRILLATION (MULTI): ICD-10-CM

## 2025-03-27 DIAGNOSIS — G47.33 OSA ON CPAP: ICD-10-CM

## 2025-03-27 PROCEDURE — 1158F ADVNC CARE PLAN TLK DOCD: CPT | Performed by: INTERNAL MEDICINE

## 2025-03-27 PROCEDURE — G0442 ANNUAL ALCOHOL SCREEN 15 MIN: HCPCS | Performed by: INTERNAL MEDICINE

## 2025-03-27 PROCEDURE — 1036F TOBACCO NON-USER: CPT | Performed by: INTERNAL MEDICINE

## 2025-03-27 PROCEDURE — G0444 DEPRESSION SCREEN ANNUAL: HCPCS | Performed by: INTERNAL MEDICINE

## 2025-03-27 PROCEDURE — 1157F ADVNC CARE PLAN IN RCRD: CPT | Performed by: INTERNAL MEDICINE

## 2025-03-27 PROCEDURE — G0439 PPPS, SUBSEQ VISIT: HCPCS | Performed by: INTERNAL MEDICINE

## 2025-03-27 PROCEDURE — 99214 OFFICE O/P EST MOD 30 MIN: CPT | Performed by: INTERNAL MEDICINE

## 2025-03-27 PROCEDURE — 1123F ACP DISCUSS/DSCN MKR DOCD: CPT | Performed by: INTERNAL MEDICINE

## 2025-03-27 PROCEDURE — 99397 PER PM REEVAL EST PAT 65+ YR: CPT | Performed by: INTERNAL MEDICINE

## 2025-03-27 PROCEDURE — 1159F MED LIST DOCD IN RCRD: CPT | Performed by: INTERNAL MEDICINE

## 2025-03-27 PROCEDURE — 3075F SYST BP GE 130 - 139MM HG: CPT | Performed by: INTERNAL MEDICINE

## 2025-03-27 PROCEDURE — 3078F DIAST BP <80 MM HG: CPT | Performed by: INTERNAL MEDICINE

## 2025-03-27 PROCEDURE — 1170F FXNL STATUS ASSESSED: CPT | Performed by: INTERNAL MEDICINE

## 2025-03-27 RX ORDER — LORAZEPAM 1 MG/1
1 TABLET ORAL 2 TIMES DAILY PRN
Qty: 60 TABLET | Refills: 2 | Status: SHIPPED | OUTPATIENT
Start: 2025-03-27 | End: 2025-06-25

## 2025-03-27 ASSESSMENT — PATIENT HEALTH QUESTIONNAIRE - PHQ9
SUM OF ALL RESPONSES TO PHQ9 QUESTIONS 1 AND 2: 0
1. LITTLE INTEREST OR PLEASURE IN DOING THINGS: NOT AT ALL
2. FEELING DOWN, DEPRESSED OR HOPELESS: NOT AT ALL

## 2025-03-27 ASSESSMENT — ENCOUNTER SYMPTOMS
DEPRESSION: 0
EYES NEGATIVE: 1
ALLERGIC/IMMUNOLOGIC NEGATIVE: 1
CARDIOVASCULAR NEGATIVE: 1
NEUROLOGICAL NEGATIVE: 1
PSYCHIATRIC NEGATIVE: 1
GASTROINTESTINAL NEGATIVE: 1
LOSS OF SENSATION IN FEET: 0
MUSCULOSKELETAL NEGATIVE: 1
ENDOCRINE NEGATIVE: 1
CONSTITUTIONAL NEGATIVE: 1
RESPIRATORY NEGATIVE: 1
OCCASIONAL FEELINGS OF UNSTEADINESS: 0
HEMATOLOGIC/LYMPHATIC NEGATIVE: 1

## 2025-03-27 ASSESSMENT — ACTIVITIES OF DAILY LIVING (ADL)
DRESSING: INDEPENDENT
GROCERY_SHOPPING: INDEPENDENT
DOING_HOUSEWORK: INDEPENDENT
MANAGING_FINANCES: INDEPENDENT
BATHING: INDEPENDENT
TAKING_MEDICATION: INDEPENDENT

## 2025-03-27 NOTE — PROGRESS NOTES
Subjective   Patient ID: Sara Russell is a 74 y.o. female who presents for 3 month follow up  and Medicare Annual Wellness Visit Subsequent.  Patient presents today in follow up re:   anxiety.    Anxiety symptoms have been overall well controlled with current medication therapy.  No adverse effects of medication reported.  No new or associated issues reported.    She continues routine follow up with Cardiology for all of her cardiac issues.        Review of Systems   Constitutional: Negative.    HENT: Negative.     Eyes: Negative.    Respiratory: Negative.     Cardiovascular: Negative.    Gastrointestinal: Negative.    Endocrine: Negative.    Genitourinary: Negative.    Musculoskeletal: Negative.    Skin: Negative.    Allergic/Immunologic: Negative.    Neurological: Negative.    Hematological: Negative.    Psychiatric/Behavioral: Negative.         Objective     Blood pressure 139/70, pulse 83, temperature 36.4 °C (97.6 °F), temperature source Temporal, weight 70 kg (154 lb 6.4 oz), SpO2 96%.   Physical Exam  Constitutional:       Appearance: Normal appearance.   HENT:      Head: Normocephalic and atraumatic.      Right Ear: External ear normal.      Left Ear: External ear normal.      Nose: Nose normal.      Mouth/Throat:      Mouth: Mucous membranes are moist.   Eyes:      Conjunctiva/sclera: Conjunctivae normal.      Pupils: Pupils are equal, round, and reactive to light.   Neck:      Vascular: No carotid bruit.   Cardiovascular:      Rate and Rhythm: Normal rate and regular rhythm.      Pulses: Normal pulses.      Heart sounds: Normal heart sounds. No murmur heard.  Pulmonary:      Effort: Pulmonary effort is normal.      Breath sounds: Normal breath sounds. No wheezing or rales.   Abdominal:      General: Abdomen is flat. There is no distension.      Palpations: Abdomen is soft.      Tenderness: There is no abdominal tenderness. There is no right CVA tenderness or left CVA tenderness.   Musculoskeletal:          General: Normal range of motion.      Cervical back: Normal range of motion and neck supple.   Skin:     General: Skin is warm and dry.   Neurological:      General: No focal deficit present.      Mental Status: She is alert and oriented to person, place, and time.   Psychiatric:         Mood and Affect: Mood normal.         Behavior: Behavior normal.         Assessment/Plan   Problem List Items Addressed This Visit       Anxiety    Relevant Medications    LORazepam (Ativan) 1 mg tablet    Benign essential hypertension    Relevant Orders    Comprehensive Metabolic Panel    CBC and Auto Differential    Chronic diastolic congestive heart failure    Hyperlipidemia    Relevant Orders    Comprehensive Metabolic Panel    Lipid Panel    APOLONIA on CPAP    Paroxysmal atrial fibrillation (Multi)     Other Visit Diagnoses       Medicare annual wellness visit, subsequent    -  Primary    Screening mammogram for breast cancer        Relevant Orders    BI mammo bilateral screening tomosynthesis          Medicare annual wellness completed with no new findings or issues identified.  She is up to date on preventive measures other than mammogram which I do not find record of completion since 9 years ago.  This is discussed.  She voices concern that this may affect her mitral valve.  She is reassured that it will not and study will be ordered.  Patient has documented advanced care planning and POA in place.  Depression screen is completed utilizing PHQ-2 with score of zero (0).  Alcohol screen is completed with no issues identified.  Physical exam completed as documented with no new findings.   Anxiety symptoms well controlled and doing well on current therapy.  Will continue present therapy and follow clinically.  I have personally reviewed the [facility] report.  This report is scanned into the electronic medical record.  I have considered the risks of abuse, dependence, addiction and diversion.  I believe that it is clinically  appropriate to prescribe this medication.  She continues follow with Cardiology re:  heart issues and anticoagulation.  Cardiology continues to manage all these issues and she is doing well although I note that she has not had lab done in over a year.  I will therefore order lab for her.  Pt. continues to use CPAP nightly and is tolerating well with clinical benefit.  No morning headaches or daytime somnolence reported. Advised to continue treatment and will continue to follow clinically.        Follow up in 3 months

## 2025-03-31 ENCOUNTER — ANTICOAGULATION - WARFARIN VISIT (OUTPATIENT)
Dept: CARDIOLOGY | Facility: HOSPITAL | Age: 75
End: 2025-03-31
Payer: MEDICARE

## 2025-03-31 ENCOUNTER — APPOINTMENT (OUTPATIENT)
Dept: CARDIOLOGY | Facility: HOSPITAL | Age: 75
End: 2025-03-31
Payer: MEDICARE

## 2025-03-31 DIAGNOSIS — I48.91 ATRIAL FIBRILLATION, UNSPECIFIED TYPE (MULTI): ICD-10-CM

## 2025-03-31 LAB
POC INR: 2 (ref 2–3)
POC PROTHROMBIN TIME: NORMAL

## 2025-03-31 PROCEDURE — 85610 PROTHROMBIN TIME: CPT | Performed by: INTERNAL MEDICINE

## 2025-03-31 PROCEDURE — 93793 ANTICOAG MGMT PT WARFARIN: CPT | Performed by: INTERNAL MEDICINE

## 2025-03-31 NOTE — PROGRESS NOTES
Anticoagulation Episode Summary       Current INR goal:  2.0-3.0   TTR:  80.8% (1.4 y)   Next INR check:  4/28/2025   INR from last check:  2.00 (3/31/2025)   Weekly max warfarin dose:  --   Target end date:  --   INR check location:  --   Preferred lab:  --   Send INR reminders to:  [facility]    Indications    Atrial fibrillation (Multi) [I48.91]             Comments:  --             Anticoagulation Care Providers       Provider Role Specialty Phone number    Vi ANGELINA Santamaria, APRN-CNP  Cardiology [phone]             More data exists     Anticoagulation Monitoring INR INR Date INR Goal Trend Last Week Total Next Week Total Sun Mon Tue   3/31/2025 2.00 3/31/2025 2.0-3.0 Same 30 mg 30 mg 5 mg 2.5 mg 5 mg   3/10/2025 2.20 3/10/2025 2.0-3.0 Same 30 mg 30 mg 5 mg 2.5 mg 5 mg   2/24/2025 2.70 2/24/2025 2.0-3.0 Same 30 mg 30 mg 5 mg 2.5 mg 5 mg   2/10/2025 2.90 2/10/2025 2.0-3.0 Same 30 mg 30 mg 5 mg 2.5 mg 5 mg   2/3/2025 2.70 2/3/2025 2.0-3.0 Same 30 mg 30 mg 5 mg 2.5 mg 5 mg   1/20/2025 2.90 1/20/2025 2.0-3.0 Same 30 mg 30 mg 5 mg 2.5 mg 5 mg   1/14/2025 3.40 1/14/2025 2.0-3.0 Down 32.5 mg 30 mg 5 mg 2.5 mg 5 mg   1/7/2025 3.10 1/7/2025 2.0-3.0 Same 32.5 mg 32.5 mg 5 mg 2.5 mg 5 mg   12/4/2024 2.60 12/4/2024 2.0-3.0 Same 32.5 mg 32.5 mg 5 mg 2.5 mg 5 mg   11/6/2024 2.50 11/6/2024 2.0-3.0 Same 32.5 mg 32.5 mg 5 mg 2.5 mg 5 mg   10/2/2024 2.60 10/2/2024 2.0-3.0 Same 32.5 mg 32.5 mg 5 mg 2.5 mg 5 mg   9/4/2024 2.60 9/4/2024 2.0-3.0 Same 32.5 mg 32.5 mg 5 mg 2.5 mg 5 mg   8/7/2024 2.80 8/7/2024 2.0-3.0 Same 32.5 mg 32.5 mg 5 mg 2.5 mg 5 mg   7/10/2024 2.70 7/10/2024 2.0-3.0 Same 32.5 mg 32.5 mg 5 mg 2.5 mg 5 mg   6/14/2024 2.80 6/14/2024 2.0-3.0 Same 32.5 mg 32.5 mg 5 mg 2.5 mg 5 mg   5/31/2024 2.10 5/31/2024 2.0-3.0 Same 32.5 mg 32.5 mg 5 mg 2.5 mg 5 mg   5/20/2024 2.50 5/20/2024 2.0-3.0 Same 32.5 mg 32.5 mg 5 mg 2.5 mg 5 mg   5/10/2024 2.30 5/9/2024 2.0-3.0 Same 32.5 mg 32.5 mg 5 mg 2.5 mg 5 mg    4/26/2024 2.20 4/26/2024 2.0-3.0 Same 32.5 mg 32.5 mg 5 mg 2.5 mg 5 mg   4/19/2024 2.20 4/18/2024 2.0-3.0 Same 32.5 mg 32.5 mg 5 mg 2.5 mg 5 mg       Anticoagulation Monitoring Wed Thu Fri Sat   3/31/2025 5 mg 2.5 mg 5 mg 5 mg   3/10/2025 5 mg 2.5 mg 5 mg 5 mg   2/24/2025 5 mg 2.5 mg 5 mg 5 mg   2/10/2025 5 mg 2.5 mg 5 mg 5 mg   2/3/2025 5 mg 2.5 mg 5 mg 5 mg   1/20/2025 5 mg 2.5 mg 5 mg 5 mg   1/14/2025 5 mg 2.5 mg 5 mg 5 mg   1/7/2025 5 mg 5 mg 5 mg 5 mg   12/4/2024 5 mg 5 mg 5 mg 5 mg   11/6/2024 5 mg 5 mg 5 mg 5 mg   10/2/2024 5 mg 5 mg 5 mg 5 mg   9/4/2024 5 mg 5 mg 5 mg 5 mg   8/7/2024 5 mg 5 mg 5 mg 5 mg   7/10/2024 5 mg 5 mg 5 mg 5 mg   6/14/2024 5 mg 5 mg 5 mg 5 mg   5/31/2024 5 mg 5 mg 5 mg 5 mg   5/20/2024 5 mg 5 mg 5 mg 5 mg   5/10/2024 5 mg 5 mg 5 mg 5 mg   4/26/2024 5 mg 5 mg 5 mg 5 mg   4/19/2024 5 mg - 5 mg 5 mg         Phone communication conducted: JORDYN VALLE 03/31/25    INR IN RANGE- Continue current dose of warfarin and repeat INR in 4 weeks.

## 2025-04-16 ENCOUNTER — APPOINTMENT (OUTPATIENT)
Dept: RADIOLOGY | Facility: HOSPITAL | Age: 75
End: 2025-04-16
Payer: MEDICARE

## 2025-04-16 ENCOUNTER — HOSPITAL ENCOUNTER (EMERGENCY)
Facility: HOSPITAL | Age: 75
Discharge: HOME | End: 2025-04-16
Payer: MEDICARE

## 2025-04-16 ENCOUNTER — OFFICE VISIT (OUTPATIENT)
Dept: URGENT CARE | Age: 75
End: 2025-04-16
Payer: MEDICARE

## 2025-04-16 VITALS
TEMPERATURE: 97.2 F | BODY MASS INDEX: 33.01 KG/M2 | OXYGEN SATURATION: 99 % | HEIGHT: 57 IN | SYSTOLIC BLOOD PRESSURE: 150 MMHG | RESPIRATION RATE: 18 BRPM | HEART RATE: 78 BPM | WEIGHT: 153 LBS | DIASTOLIC BLOOD PRESSURE: 79 MMHG

## 2025-04-16 VITALS
BODY MASS INDEX: 33.01 KG/M2 | HEART RATE: 96 BPM | RESPIRATION RATE: 16 BRPM | OXYGEN SATURATION: 97 % | SYSTOLIC BLOOD PRESSURE: 159 MMHG | WEIGHT: 153 LBS | TEMPERATURE: 97.9 F | HEIGHT: 57 IN | DIASTOLIC BLOOD PRESSURE: 82 MMHG

## 2025-04-16 DIAGNOSIS — K80.20 CALCULUS OF GALLBLADDER WITHOUT CHOLECYSTITIS WITHOUT OBSTRUCTION: Primary | ICD-10-CM

## 2025-04-16 DIAGNOSIS — R10.11 RIGHT UPPER QUADRANT ABDOMINAL PAIN: Primary | ICD-10-CM

## 2025-04-16 DIAGNOSIS — R31.9 HEMATURIA, UNSPECIFIED TYPE: ICD-10-CM

## 2025-04-16 LAB
ALBUMIN SERPL BCP-MCNC: 4.2 G/DL (ref 3.4–5)
ALP SERPL-CCNC: 47 U/L (ref 33–136)
ALT SERPL W P-5'-P-CCNC: 20 U/L (ref 7–45)
ANION GAP SERPL CALC-SCNC: 11 MMOL/L (ref 10–20)
APPEARANCE UR: CLEAR
AST SERPL W P-5'-P-CCNC: 41 U/L (ref 9–39)
BASOPHILS # BLD AUTO: 0.05 X10*3/UL (ref 0–0.1)
BASOPHILS NFR BLD AUTO: 0.3 %
BILIRUB SERPL-MCNC: 0.7 MG/DL (ref 0–1.2)
BILIRUB UR STRIP.AUTO-MCNC: NEGATIVE MG/DL
BUN SERPL-MCNC: 23 MG/DL (ref 6–23)
CALCIUM SERPL-MCNC: 9.6 MG/DL (ref 8.6–10.3)
CHLORIDE SERPL-SCNC: 106 MMOL/L (ref 98–107)
CO2 SERPL-SCNC: 24 MMOL/L (ref 21–32)
COLOR UR: YELLOW
CREAT SERPL-MCNC: 0.78 MG/DL (ref 0.5–1.05)
EGFRCR SERPLBLD CKD-EPI 2021: 80 ML/MIN/1.73M*2
EOSINOPHIL # BLD AUTO: 0.04 X10*3/UL (ref 0–0.4)
EOSINOPHIL NFR BLD AUTO: 0.3 %
ERYTHROCYTE [DISTWIDTH] IN BLOOD BY AUTOMATED COUNT: 13 % (ref 11.5–14.5)
GLUCOSE SERPL-MCNC: 141 MG/DL (ref 74–99)
GLUCOSE UR STRIP.AUTO-MCNC: NORMAL MG/DL
HCT VFR BLD AUTO: 45 % (ref 36–46)
HGB BLD-MCNC: 14.9 G/DL (ref 12–16)
HYALINE CASTS #/AREA URNS AUTO: ABNORMAL /LPF
IMM GRANULOCYTES # BLD AUTO: 0.05 X10*3/UL (ref 0–0.5)
IMM GRANULOCYTES NFR BLD AUTO: 0.3 % (ref 0–0.9)
KETONES UR STRIP.AUTO-MCNC: NEGATIVE MG/DL
LEUKOCYTE ESTERASE UR QL STRIP.AUTO: ABNORMAL
LYMPHOCYTES # BLD AUTO: 1.67 X10*3/UL (ref 0.8–3)
LYMPHOCYTES NFR BLD AUTO: 11.3 %
MCH RBC QN AUTO: 29.9 PG (ref 26–34)
MCHC RBC AUTO-ENTMCNC: 33.1 G/DL (ref 32–36)
MCV RBC AUTO: 90 FL (ref 80–100)
MONOCYTES # BLD AUTO: 0.9 X10*3/UL (ref 0.05–0.8)
MONOCYTES NFR BLD AUTO: 6.1 %
MUCOUS THREADS #/AREA URNS AUTO: ABNORMAL /LPF
NEUTROPHILS # BLD AUTO: 12.06 X10*3/UL (ref 1.6–5.5)
NEUTROPHILS NFR BLD AUTO: 81.7 %
NITRITE UR QL STRIP.AUTO: NEGATIVE
NRBC BLD-RTO: 0 /100 WBCS (ref 0–0)
PH UR STRIP.AUTO: 5.5 [PH]
PLATELET # BLD AUTO: 276 X10*3/UL (ref 150–450)
POC APPEARANCE, URINE: CLEAR
POC BILIRUBIN, URINE: NEGATIVE
POC BLOOD, URINE: ABNORMAL
POC COLOR, URINE: ABNORMAL
POC GLUCOSE, URINE: NEGATIVE MG/DL
POC KETONES, URINE: NEGATIVE MG/DL
POC LEUKOCYTES, URINE: NEGATIVE
POC NITRITE,URINE: NEGATIVE
POC PH, URINE: 6 PH
POC PROTEIN, URINE: NEGATIVE MG/DL
POC SPECIFIC GRAVITY, URINE: >=1.03
POC UROBILINOGEN, URINE: 0.2 EU/DL
POTASSIUM SERPL-SCNC: 5.2 MMOL/L (ref 3.5–5.3)
PROT SERPL-MCNC: 7.7 G/DL (ref 6.4–8.2)
PROT UR STRIP.AUTO-MCNC: NEGATIVE MG/DL
RBC # BLD AUTO: 4.98 X10*6/UL (ref 4–5.2)
RBC # UR STRIP.AUTO: ABNORMAL MG/DL
RBC #/AREA URNS AUTO: ABNORMAL /HPF
SODIUM SERPL-SCNC: 136 MMOL/L (ref 136–145)
SP GR UR STRIP.AUTO: 1.03
UROBILINOGEN UR STRIP.AUTO-MCNC: NORMAL MG/DL
WBC # BLD AUTO: 14.8 X10*3/UL (ref 4.4–11.3)
WBC #/AREA URNS AUTO: ABNORMAL /HPF

## 2025-04-16 PROCEDURE — 96360 HYDRATION IV INFUSION INIT: CPT

## 2025-04-16 PROCEDURE — 81001 URINALYSIS AUTO W/SCOPE: CPT | Performed by: EMERGENCY MEDICINE

## 2025-04-16 PROCEDURE — 96361 HYDRATE IV INFUSION ADD-ON: CPT

## 2025-04-16 PROCEDURE — 76705 ECHO EXAM OF ABDOMEN: CPT

## 2025-04-16 PROCEDURE — 87086 URINE CULTURE/COLONY COUNT: CPT | Mod: PORLAB | Performed by: EMERGENCY MEDICINE

## 2025-04-16 PROCEDURE — 2550000001 HC RX 255 CONTRASTS: Mod: JZ | Performed by: NURSE PRACTITIONER

## 2025-04-16 PROCEDURE — 99285 EMERGENCY DEPT VISIT HI MDM: CPT | Mod: 25

## 2025-04-16 PROCEDURE — 74177 CT ABD & PELVIS W/CONTRAST: CPT | Performed by: RADIOLOGY

## 2025-04-16 PROCEDURE — 36415 COLL VENOUS BLD VENIPUNCTURE: CPT | Performed by: EMERGENCY MEDICINE

## 2025-04-16 PROCEDURE — 74177 CT ABD & PELVIS W/CONTRAST: CPT

## 2025-04-16 PROCEDURE — 85025 COMPLETE CBC W/AUTO DIFF WBC: CPT | Performed by: EMERGENCY MEDICINE

## 2025-04-16 PROCEDURE — 80053 COMPREHEN METABOLIC PANEL: CPT | Performed by: EMERGENCY MEDICINE

## 2025-04-16 PROCEDURE — 76705 ECHO EXAM OF ABDOMEN: CPT | Performed by: RADIOLOGY

## 2025-04-16 PROCEDURE — 2500000004 HC RX 250 GENERAL PHARMACY W/ HCPCS (ALT 636 FOR OP/ED): Mod: JZ | Performed by: NURSE PRACTITIONER

## 2025-04-16 RX ORDER — ONDANSETRON HYDROCHLORIDE 2 MG/ML
4 INJECTION, SOLUTION INTRAVENOUS ONCE
Status: DISCONTINUED | OUTPATIENT
Start: 2025-04-16 | End: 2025-04-17 | Stop reason: HOSPADM

## 2025-04-16 RX ORDER — DICYCLOMINE HYDROCHLORIDE 20 MG/1
20 TABLET ORAL 3 TIMES DAILY
Qty: 15 TABLET | Refills: 0 | Status: SHIPPED | OUTPATIENT
Start: 2025-04-16 | End: 2025-04-21

## 2025-04-16 RX ORDER — OMEPRAZOLE 40 MG/1
40 CAPSULE, DELAYED RELEASE ORAL
Qty: 30 CAPSULE | Refills: 0 | Status: SHIPPED | OUTPATIENT
Start: 2025-04-16 | End: 2025-05-16

## 2025-04-16 RX ORDER — MORPHINE SULFATE 4 MG/ML
4 INJECTION INTRAVENOUS ONCE
Status: DISCONTINUED | OUTPATIENT
Start: 2025-04-16 | End: 2025-04-17 | Stop reason: HOSPADM

## 2025-04-16 RX ORDER — ONDANSETRON 4 MG/1
4 TABLET, ORALLY DISINTEGRATING ORAL EVERY 8 HOURS PRN
Qty: 9 TABLET | Refills: 0 | Status: SHIPPED | OUTPATIENT
Start: 2025-04-16 | End: 2025-04-19

## 2025-04-16 RX ADMIN — SODIUM CHLORIDE 1000 ML: 0.9 INJECTION, SOLUTION INTRAVENOUS at 18:15

## 2025-04-16 RX ADMIN — IOHEXOL 75 ML: 350 INJECTION, SOLUTION INTRAVENOUS at 19:02

## 2025-04-16 ASSESSMENT — LIFESTYLE VARIABLES
HAVE YOU EVER FELT YOU SHOULD CUT DOWN ON YOUR DRINKING: NO
HAVE PEOPLE ANNOYED YOU BY CRITICIZING YOUR DRINKING: NO
TOTAL SCORE: 0
EVER FELT BAD OR GUILTY ABOUT YOUR DRINKING: NO
EVER HAD A DRINK FIRST THING IN THE MORNING TO STEADY YOUR NERVES TO GET RID OF A HANGOVER: NO

## 2025-04-16 ASSESSMENT — PAIN SCALES - GENERAL
PAINLEVEL_OUTOF10: 5 - MODERATE PAIN
PAINLEVEL_OUTOF10: 6
PAINLEVEL_OUTOF10: 0 - NO PAIN

## 2025-04-16 ASSESSMENT — PAIN DESCRIPTION - LOCATION: LOCATION: BACK

## 2025-04-16 ASSESSMENT — COLUMBIA-SUICIDE SEVERITY RATING SCALE - C-SSRS
1. IN THE PAST MONTH, HAVE YOU WISHED YOU WERE DEAD OR WISHED YOU COULD GO TO SLEEP AND NOT WAKE UP?: NO
2. HAVE YOU ACTUALLY HAD ANY THOUGHTS OF KILLING YOURSELF?: NO
6. HAVE YOU EVER DONE ANYTHING, STARTED TO DO ANYTHING, OR PREPARED TO DO ANYTHING TO END YOUR LIFE?: NO

## 2025-04-16 ASSESSMENT — PAIN - FUNCTIONAL ASSESSMENT: PAIN_FUNCTIONAL_ASSESSMENT: 0-10

## 2025-04-16 ASSESSMENT — ENCOUNTER SYMPTOMS
DEPRESSION: 0
LOSS OF SENSATION IN FEET: 0
ABDOMINAL PAIN: 1
OCCASIONAL FEELINGS OF UNSTEADINESS: 0

## 2025-04-16 ASSESSMENT — PATIENT HEALTH QUESTIONNAIRE - PHQ9
2. FEELING DOWN, DEPRESSED OR HOPELESS: NOT AT ALL
SUM OF ALL RESPONSES TO PHQ9 QUESTIONS 1 AND 2: 0
1. LITTLE INTEREST OR PLEASURE IN DOING THINGS: NOT AT ALL

## 2025-04-16 NOTE — ED PROVIDER NOTES
"    HPI   Chief Complaint   Patient presents with    Flank Pain       This is a 74-year-old  female that is presenting to the emergency room with complaints of right upper quadrant abdominal pain that started yesterday.  The patient reports that it went away but came back 30 minutes after eating this morning.  The pain is sharp.  She does not have any associated nausea, vomiting, diarrhea, or alteration in her urine function.  She has reported associated chills.  She denies any fever or cold-like symptoms.  She denies any vaginal symptoms.  She did not take any medications prior to arrival for pain.  She has never had this pain before.      History provided by:  Patient   used: No                          No data recorded                Patient History   Medical History[1]  Surgical History[2]  Family History[3]  Social History[4]    Physical Exam   Visit Vitals  /71   Pulse 75   Temp 36.8 °C (98.2 °F) (Temporal)   Resp 16   Ht 1.448 m (4' 9\")   Wt 69.4 kg (153 lb)   LMP  (LMP Unknown)   SpO2 96%   BMI 33.11 kg/m²   OB Status Postmenopausal   Smoking Status Never   BSA 1.67 m²      Physical Exam  Vitals and nursing note reviewed.   Constitutional:       Appearance: Normal appearance.   HENT:      Head: Normocephalic and atraumatic.      Right Ear: External ear normal.      Left Ear: External ear normal.      Nose: Nose normal.      Mouth/Throat:      Pharynx: Oropharynx is clear.   Eyes:      Conjunctiva/sclera: Conjunctivae normal.   Cardiovascular:      Rate and Rhythm: Normal rate and regular rhythm.      Pulses: Normal pulses.   Pulmonary:      Effort: Pulmonary effort is normal.      Breath sounds: Normal breath sounds.   Abdominal:      General: Bowel sounds are normal.      Palpations: Abdomen is soft.      Tenderness: There is abdominal tenderness in the right upper quadrant. There is no guarding or rebound. Negative signs include Saunders's sign.   Genitourinary:     " Comments: No CVA tenderness or pubic pain.  Musculoskeletal:         General: Normal range of motion.   Skin:     General: Skin is warm and dry.      Capillary Refill: Capillary refill takes less than 2 seconds.   Neurological:      General: No focal deficit present.      Mental Status: She is alert and oriented to person, place, and time.   Psychiatric:         Mood and Affect: Mood normal.         Judgment: Judgment normal.         No orders to display       Labs Reviewed   CBC WITH AUTO DIFFERENTIAL - Abnormal       Result Value    WBC 14.8 (*)     nRBC 0.0      RBC 4.98      Hemoglobin 14.9      Hematocrit 45.0      MCV 90      MCH 29.9      MCHC 33.1      RDW 13.0      Platelets 276      Neutrophils % 81.7      Immature Granulocytes %, Automated 0.3      Lymphocytes % 11.3      Monocytes % 6.1      Eosinophils % 0.3      Basophils % 0.3      Neutrophils Absolute 12.06 (*)     Immature Granulocytes Absolute, Automated 0.05      Lymphocytes Absolute 1.67      Monocytes Absolute 0.90 (*)     Eosinophils Absolute 0.04      Basophils Absolute 0.05     COMPREHENSIVE METABOLIC PANEL - Abnormal    Glucose 141 (*)     Sodium 136      Potassium 5.2      Chloride 106      Bicarbonate 24      Anion Gap 11      Urea Nitrogen 23      Creatinine 0.78      eGFR 80      Calcium 9.6      Albumin 4.2      Alkaline Phosphatase 47      Total Protein 7.7      AST 41 (*)     Bilirubin, Total 0.7      ALT 20     URINALYSIS WITH REFLEX CULTURE AND MICROSCOPIC - Abnormal    Color, Urine Yellow      Appearance, Urine Clear      Specific Gravity, Urine 1.026      pH, Urine 5.5      Protein, Urine NEGATIVE      Glucose, Urine Normal      Blood, Urine 0.03 (TRACE) (*)     Ketones, Urine NEGATIVE      Bilirubin, Urine NEGATIVE      Urobilinogen, Urine Normal      Nitrite, Urine NEGATIVE      Leukocyte Esterase, Urine 25 George/uL (*)    MICROSCOPIC ONLY, URINE - Abnormal    WBC, Urine 6-10 (*)     RBC, Urine 3-5      Mucus, Urine FEW       Hyaline Casts, Urine 2+ (*)    URINE CULTURE   URINALYSIS WITH REFLEX CULTURE AND MICROSCOPIC    Narrative:     The following orders were created for panel order Urinalysis with Reflex Culture and Microscopic.  Procedure                               Abnormality         Status                     ---------                               -----------         ------                     Urinalysis with Reflex C...[[id number]]  Abnormal            Final result               Extra Urine Gray Tube[[id number]]                            In process                   Please view results for these tests on the individual orders.   EXTRA URINE GRAY TUBE         ED Course & MDM   Diagnoses as of 04/16/25 2218   Calculus of gallbladder without cholecystitis without obstruction           Medical Decision Making  The patient was seen and evaluated by the nurse practitioner, Chanel Mccain.  The patient is presenting to the emergency room with concern for right upper quadrant abdominal pain.  Differential diagnosis includes cholelithiasis, cholecystitis, urinary tract infection, kidney stone, pyelonephritis, GERD, constipation, obstruction, or other acute process.  A saline lock was established.  Laboratory studies were drawn with results as noted.  The patient was administered morphine 4 mg IVP, 1 L of normal saline, and Zofran 4 mg IVP.  The patient reported improvement of her pain symptoms after medication administration.  Laboratory studies showed the patient had a mild leukocytosis of 14.8.  No lactic acidosis.  CMP showed a AST of 41 and a glucose of 141.  Routine urinalysis showed 25 leuks and 6-5 WBCs.  CAT scan of the abdomen and pelvis was performed and showed cholelithiasis with contracted gallbladder and not well-evaluated.  An ultrasound was performed and showed cholelithiasis with no evidence of acute cholecystitis.  It also showed that the patient had coarsened hepatic echotexture.  The patient was notified of the imaging  results.  The patient was advised to adhere to a low-fat diet.  She was referred to surgery for further evaluation and treatment.  She was given strict return precautions.  The patient was provided prescriptions for Zofran, Bentyl, and soft for home administration.  She was discharged in stable condition with computer discharge instructions given.           Your medication list        ASK your doctor about these medications        Instructions Last Dose Given Next Dose Due   amLODIPine 5 mg tablet  Commonly known as: Norvasc      Take 1 tablet (5 mg) by mouth once daily.       cholecalciferol 25 mcg (1,000 units) tablet  Commonly known as: Vitamin D-3           fish oil concentrate 120-180 mg capsule  Commonly known as: Omega-3           lisinopril 20 mg tablet      Take 1 tablet (20 mg) by mouth 2 times a day.       LORazepam 1 mg tablet  Commonly known as: Ativan      Take 1 tablet (1 mg) by mouth 2 times a day as needed for anxiety.       MAGNESIUM ORAL           metoprolol tartrate 50 mg tablet  Commonly known as: Lopressor      Take 1 tablet by mouth 2 times a day.       nystatin 100,000 unit/gram powder  Commonly known as: Mycostatin      Apply 1 Application topically 2 times a day.       rosuvastatin 40 mg tablet  Commonly known as: Crestor      Take 0.5 tablets (20 mg) by mouth once daily.       VITAMIN C ORAL           warfarin 5 mg tablet  Commonly known as: Coumadin      Take as directed. If you are unsure how to take this medication, talk to your nurse or doctor.  Original instructions: Take 1 tablet (5 mg) by mouth once daily in the evening. Or as directed by clinician                Procedure       *This report was transcribed using voice recognition software.  Every effort was made to ensure accuracy; however, inadvertent computerized transcription errors may be present.*  Chanel OSORIO-TRINY  04/16/25           [1]   Past Medical History:  Diagnosis Date    Other long term (current) drug therapy      Long term current use of amiodarone    Personal history of other specified conditions     History of palpitations   [2]   Past Surgical History:  Procedure Laterality Date     SECTION, CLASSIC  2018     Section    OTHER SURGICAL HISTORY  2019    Coronary artery bypass graft    OTHER SURGICAL HISTORY  2018    Maze procedure    OTHER SURGICAL HISTORY  2018    Mitral valve annuloplasty    OTHER SURGICAL HISTORY  2018    Exclusion of atrial appendage   [3]   Family History  Problem Relation Name Age of Onset    Hypertension Mother      Hyperlipidemia Mother      Hypertension Father      Hypercalcemia Father      Heart attack Mother's Sister      Heart attack Mother's Brother      Breast cancer Father's Sister      Heart attack Paternal Grandfather     [4]   Social History  Tobacco Use    Smoking status: Never    Smokeless tobacco: Never   Vaping Use    Vaping status: Never Used   Substance Use Topics    Alcohol use: Yes     Alcohol/week: 1.0 standard drink of alcohol     Types: 1 Glasses of wine per week    Drug use: Never        DEVON Escobar-TRINY  [id number]

## 2025-04-16 NOTE — PATIENT INSTRUCTIONS
Patient with signs and symptoms consistent with abdominal pain  that cannot be properly assessed in office and is stable. Patient referred to ED for further  evaluation and testing r/o pancreatitis, cholecystitis, other pathology  declined squad, alert and oriented, able to make decisions, verbalized understanding of increased risk factors, including death, signed AMA form, agreed to pull over and call 911 if worsening   Dr Montoya notifed

## 2025-04-16 NOTE — ED TRIAGE NOTES
Pt presents to the ED with right sided flank pain. Seen by urgent care. Told to come here for possible kidney stones.

## 2025-04-16 NOTE — PROGRESS NOTES
"Subjective   Patient ID: Sara Russell is a 74 y.o. female. They present today with a chief complaint of Abdominal Pain (C/O RUQ abd pain     History of Present Illness  States that yesterday she was having RUQ tenderness that started after eating, it went away then came back today about 30 min ago after eating.  Pain is sharp, no nausea, vomiting, diarrhea, or chest pain.  Is not able to get comfortable.      Abdominal Pain        Past Medical History  Allergies as of 04/16/2025    (No Known Allergies)       Prescriptions Prior to Admission[1]     Medical History[2]    Surgical History[3]     reports that she has never smoked. She has never used smokeless tobacco. She reports current alcohol use of about 1.0 standard drink of alcohol per week. She reports that she does not use drugs.    Review of Systems  Review of Systems   Gastrointestinal:  Positive for abdominal pain.                                  Objective    Vitals:    04/16/25 1359 04/16/25 1402   BP: 175/84 159/82   BP Location: Left arm Right arm   Patient Position: Sitting Sitting   BP Cuff Size: Adult Adult   Pulse: 97 96   Resp: 16    Temp: 36.6 °C (97.9 °F)    TempSrc: Oral    SpO2: 97%    Weight: 69.4 kg (153 lb)    Height: 1.448 m (4' 9\")      No LMP recorded (lmp unknown). Patient is postmenopausal.    Physical Exam  Constitutional:       Comments: Appears uncomfortable   Cardiovascular:      Rate and Rhythm: Normal rate and regular rhythm.      Pulses: Normal pulses.      Heart sounds: Normal heart sounds.   Pulmonary:      Effort: Pulmonary effort is normal.      Breath sounds: Normal breath sounds.   Abdominal:      General: Bowel sounds are normal.      Palpations: Abdomen is soft. There is no mass.      Tenderness: There is abdominal tenderness in the right upper quadrant. There is no guarding or rebound. Negative signs include Saunders's sign.   Neurological:      Mental Status: She is alert.         Procedures    Point of Care Test & " Imaging Results from this visit  Results for orders placed or performed in visit on 04/16/25   POCT UA Automated manually resulted   Result Value Ref Range    POC Color, Urine Dark Mireille (A) Straw, Yellow, Light-Yellow    POC Appearance, Urine Clear Clear    POC Glucose, Urine NEGATIVE NEGATIVE mg/dl    POC Bilirubin, Urine NEGATIVE NEGATIVE    POC Ketones, Urine NEGATIVE NEGATIVE mg/dl    POC Specific Gravity, Urine >=1.030 1.005 - 1.035    POC Blood, Urine TRACE-Intact (A) NEGATIVE    POC PH, Urine 6.0 No Reference Range Established PH    POC Protein, Urine NEGATIVE NEGATIVE mg/dl    POC Urobilinogen, Urine 0.2 0.2, 1.0 EU/DL    Poc Nitrite, Urine NEGATIVE NEGATIVE    POC Leukocytes, Urine NEGATIVE NEGATIVE      Imaging  No results found.    Cardiology, Vascular, and Other Imaging  No other imaging results found for the past 2 days      Diagnostic study results (if any) were reviewed by NEDA Mccormack.    Assessment/Plan   Allergies, medications, history, and pertinent labs/EKGs/Imaging reviewed by NEDA Mccormack.     Medical Decision Making  Patient with signs and symptoms consistent with abdominal pain  that cannot be properly assessed in office and is stable. Patient referred to ED for further  evaluation and testing r/o pancreatitis, cholecystitis, other pathology  declined squad, alert and oriented, able to make decisions, verbalized understanding of increased risk factors, including death, signed AMA form, agreed to pull over and call 911 if worsening   Dr Montoya notifed    Orders and Diagnoses  Diagnoses and all orders for this visit:  Right upper quadrant abdominal pain  -     POCT UA Automated manually resulted      Medical Admin Record      Patient disposition: ED    Electronically signed by NEDA Mccormack  2:22 PM           [1] (Not in a hospital admission)  [2]   Past Medical History:  Diagnosis Date    Other long term (current) drug therapy     Long term current use of  amiodarone    Personal history of other specified conditions     History of palpitations   [3]   Past Surgical History:  Procedure Laterality Date     SECTION, CLASSIC  2018     Section    OTHER SURGICAL HISTORY  2019    Coronary artery bypass graft    OTHER SURGICAL HISTORY  2018    Maze procedure    OTHER SURGICAL HISTORY  2018    Mitral valve annuloplasty    OTHER SURGICAL HISTORY  2018    Exclusion of atrial appendage

## 2025-04-17 LAB — HOLD SPECIMEN: NORMAL

## 2025-04-18 LAB — BACTERIA UR CULT: NORMAL

## 2025-04-28 ENCOUNTER — ANTICOAGULATION - WARFARIN VISIT (OUTPATIENT)
Dept: CARDIOLOGY | Facility: HOSPITAL | Age: 75
End: 2025-04-28
Payer: MEDICARE

## 2025-04-28 ENCOUNTER — APPOINTMENT (OUTPATIENT)
Dept: CARDIOLOGY | Facility: HOSPITAL | Age: 75
End: 2025-04-28
Payer: MEDICARE

## 2025-04-28 DIAGNOSIS — I48.91 ATRIAL FIBRILLATION, UNSPECIFIED TYPE (MULTI): ICD-10-CM

## 2025-04-28 LAB
POC INR: 2.7 (ref 2–3)
POC PROTHROMBIN TIME: ABNORMAL (ref 9.3–12.5)

## 2025-04-28 PROCEDURE — 85610 PROTHROMBIN TIME: CPT | Mod: QW | Performed by: INTERNAL MEDICINE

## 2025-04-28 PROCEDURE — 93793 ANTICOAG MGMT PT WARFARIN: CPT | Performed by: INTERNAL MEDICINE

## 2025-04-28 PROCEDURE — 85610 PROTHROMBIN TIME: CPT | Performed by: INTERNAL MEDICINE

## 2025-04-28 NOTE — PROGRESS NOTES
Anticoagulation Episode Summary       Current INR goal:  2.0-3.0   TTR:  81.7% (1.5 y)   Next INR check:  5/26/2025   INR from last check:  2.70 (4/28/2025)   Weekly max warfarin dose:  --   Target end date:  --   INR check location:  --   Preferred lab:  --   Send INR reminders to:  [facility]    Indications    Atrial fibrillation (Multi) [I48.91]             Comments:  --             Anticoagulation Care Providers       Provider Role Specialty Phone number    Vi ANGELINA Santamaria, APRN-CNP  Cardiology [phone]             More data exists     Anticoagulation Monitoring INR INR Date INR Goal Trend Last Week Total Next Week Total Sun Mon Tue 4/28/2025 2.70 4/28/2025 2.0-3.0 Same 30 mg 30 mg 5 mg 2.5 mg 5 mg   3/31/2025 2.00 3/31/2025 2.0-3.0 Same 30 mg 30 mg 5 mg 2.5 mg 5 mg   3/10/2025 2.20 3/10/2025 2.0-3.0 Same 30 mg 30 mg 5 mg 2.5 mg 5 mg   2/24/2025 2.70 2/24/2025 2.0-3.0 Same 30 mg 30 mg 5 mg 2.5 mg 5 mg   2/10/2025 2.90 2/10/2025 2.0-3.0 Same 30 mg 30 mg 5 mg 2.5 mg 5 mg   2/3/2025 2.70 2/3/2025 2.0-3.0 Same 30 mg 30 mg 5 mg 2.5 mg 5 mg   1/20/2025 2.90 1/20/2025 2.0-3.0 Same 30 mg 30 mg 5 mg 2.5 mg 5 mg   1/14/2025 3.40 1/14/2025 2.0-3.0 Down 32.5 mg 30 mg 5 mg 2.5 mg 5 mg   1/7/2025 3.10 1/7/2025 2.0-3.0 Same 32.5 mg 32.5 mg 5 mg 2.5 mg 5 mg   12/4/2024 2.60 12/4/2024 2.0-3.0 Same 32.5 mg 32.5 mg 5 mg 2.5 mg 5 mg   11/6/2024 2.50 11/6/2024 2.0-3.0 Same 32.5 mg 32.5 mg 5 mg 2.5 mg 5 mg   10/2/2024 2.60 10/2/2024 2.0-3.0 Same 32.5 mg 32.5 mg 5 mg 2.5 mg 5 mg   9/4/2024 2.60 9/4/2024 2.0-3.0 Same 32.5 mg 32.5 mg 5 mg 2.5 mg 5 mg   8/7/2024 2.80 8/7/2024 2.0-3.0 Same 32.5 mg 32.5 mg 5 mg 2.5 mg 5 mg   7/10/2024 2.70 7/10/2024 2.0-3.0 Same 32.5 mg 32.5 mg 5 mg 2.5 mg 5 mg   6/14/2024 2.80 6/14/2024 2.0-3.0 Same 32.5 mg 32.5 mg 5 mg 2.5 mg 5 mg   5/31/2024 2.10 5/31/2024 2.0-3.0 Same 32.5 mg 32.5 mg 5 mg 2.5 mg 5 mg   5/20/2024 2.50 5/20/2024 2.0-3.0 Same 32.5 mg 32.5 mg 5 mg 2.5 mg 5 mg   5/10/2024  2.30 5/9/2024 2.0-3.0 Same 32.5 mg 32.5 mg 5 mg 2.5 mg 5 mg   4/26/2024 2.20 4/26/2024 2.0-3.0 Same 32.5 mg 32.5 mg 5 mg 2.5 mg 5 mg       Anticoagulation Monitoring Wed Thu Fri Sat   4/28/2025 5 mg 2.5 mg 5 mg 5 mg   3/31/2025 5 mg 2.5 mg 5 mg 5 mg   3/10/2025 5 mg 2.5 mg 5 mg 5 mg   2/24/2025 5 mg 2.5 mg 5 mg 5 mg   2/10/2025 5 mg 2.5 mg 5 mg 5 mg   2/3/2025 5 mg 2.5 mg 5 mg 5 mg   1/20/2025 5 mg 2.5 mg 5 mg 5 mg   1/14/2025 5 mg 2.5 mg 5 mg 5 mg   1/7/2025 5 mg 5 mg 5 mg 5 mg   12/4/2024 5 mg 5 mg 5 mg 5 mg   11/6/2024 5 mg 5 mg 5 mg 5 mg   10/2/2024 5 mg 5 mg 5 mg 5 mg   9/4/2024 5 mg 5 mg 5 mg 5 mg   8/7/2024 5 mg 5 mg 5 mg 5 mg   7/10/2024 5 mg 5 mg 5 mg 5 mg   6/14/2024 5 mg 5 mg 5 mg 5 mg   5/31/2024 5 mg 5 mg 5 mg 5 mg   5/20/2024 5 mg 5 mg 5 mg 5 mg   5/10/2024 5 mg 5 mg 5 mg 5 mg   4/26/2024 5 mg 5 mg 5 mg 5 mg         Phone communication conducted: [facility]    INR IN RANGE- Continue current dose of warfarin and repeat INR in 4W.

## 2025-05-07 ENCOUNTER — HOSPITAL ENCOUNTER (OUTPATIENT)
Dept: RADIOLOGY | Facility: HOSPITAL | Age: 75
Discharge: HOME | End: 2025-05-07
Payer: MEDICARE

## 2025-05-07 VITALS — BODY MASS INDEX: 33.01 KG/M2 | WEIGHT: 153 LBS | HEIGHT: 57 IN

## 2025-05-07 DIAGNOSIS — Z12.31 SCREENING MAMMOGRAM FOR BREAST CANCER: ICD-10-CM

## 2025-05-07 PROCEDURE — 77067 SCR MAMMO BI INCL CAD: CPT | Performed by: RADIOLOGY

## 2025-05-07 PROCEDURE — 77067 SCR MAMMO BI INCL CAD: CPT

## 2025-05-07 PROCEDURE — 77063 BREAST TOMOSYNTHESIS BI: CPT | Performed by: RADIOLOGY

## 2025-05-27 ENCOUNTER — APPOINTMENT (OUTPATIENT)
Dept: CARDIOLOGY | Facility: HOSPITAL | Age: 75
End: 2025-05-27
Payer: MEDICARE

## 2025-05-28 ENCOUNTER — ANTICOAGULATION - WARFARIN VISIT (OUTPATIENT)
Dept: CARDIOLOGY | Facility: HOSPITAL | Age: 75
End: 2025-05-28
Payer: MEDICARE

## 2025-05-28 ENCOUNTER — CLINICAL SUPPORT (OUTPATIENT)
Dept: CARDIOLOGY | Facility: HOSPITAL | Age: 75
End: 2025-05-28
Payer: MEDICARE

## 2025-05-28 DIAGNOSIS — I48.91 ATRIAL FIBRILLATION, UNSPECIFIED TYPE (MULTI): ICD-10-CM

## 2025-05-28 LAB
POC INR: 2.6 (ref 2–3)
POC PROTHROMBIN TIME: ABNORMAL (ref 9.3–12.5)

## 2025-05-28 PROCEDURE — 85610 PROTHROMBIN TIME: CPT | Mod: QW | Performed by: INTERNAL MEDICINE

## 2025-05-28 PROCEDURE — 93793 ANTICOAG MGMT PT WARFARIN: CPT | Performed by: INTERNAL MEDICINE

## 2025-05-28 NOTE — PROGRESS NOTES
Anticoagulation Episode Summary       Current INR goal:  2.0-3.0   TTR:  82.7% (1.6 y)   Next INR check:  6/25/2025   INR from last check:  2.60 (5/28/2025)   Weekly max warfarin dose:  --   Target end date:  --   INR check location:  --   Preferred lab:  --   Send INR reminders to:  [facility]    Indications    Atrial fibrillation (Multi) [I48.91]             Comments:  --             Anticoagulation Care Providers       Provider Role Specialty Phone number    Vi ANGELINA Santamaria, APRN-CNP  Cardiology [phone]             More data exists     Anticoagulation Monitoring INR INR Date INR Goal Trend Last Week Total Next Week Total Sun Mon Tue 5/28/2025 2.60 5/28/2025 2.0-3.0 Same 30 mg 30 mg 5 mg 2.5 mg 5 mg   4/28/2025 2.70 4/28/2025 2.0-3.0 Same 30 mg 30 mg 5 mg 2.5 mg 5 mg   3/31/2025 2.00 3/31/2025 2.0-3.0 Same 30 mg 30 mg 5 mg 2.5 mg 5 mg   3/10/2025 2.20 3/10/2025 2.0-3.0 Same 30 mg 30 mg 5 mg 2.5 mg 5 mg   2/24/2025 2.70 2/24/2025 2.0-3.0 Same 30 mg 30 mg 5 mg 2.5 mg 5 mg   2/10/2025 2.90 2/10/2025 2.0-3.0 Same 30 mg 30 mg 5 mg 2.5 mg 5 mg   2/3/2025 2.70 2/3/2025 2.0-3.0 Same 30 mg 30 mg 5 mg 2.5 mg 5 mg   1/20/2025 2.90 1/20/2025 2.0-3.0 Same 30 mg 30 mg 5 mg 2.5 mg 5 mg   1/14/2025 3.40 1/14/2025 2.0-3.0 Down 32.5 mg 30 mg 5 mg 2.5 mg 5 mg   1/7/2025 3.10 1/7/2025 2.0-3.0 Same 32.5 mg 32.5 mg 5 mg 2.5 mg 5 mg   12/4/2024 2.60 12/4/2024 2.0-3.0 Same 32.5 mg 32.5 mg 5 mg 2.5 mg 5 mg   11/6/2024 2.50 11/6/2024 2.0-3.0 Same 32.5 mg 32.5 mg 5 mg 2.5 mg 5 mg   10/2/2024 2.60 10/2/2024 2.0-3.0 Same 32.5 mg 32.5 mg 5 mg 2.5 mg 5 mg   9/4/2024 2.60 9/4/2024 2.0-3.0 Same 32.5 mg 32.5 mg 5 mg 2.5 mg 5 mg   8/7/2024 2.80 8/7/2024 2.0-3.0 Same 32.5 mg 32.5 mg 5 mg 2.5 mg 5 mg   7/10/2024 2.70 7/10/2024 2.0-3.0 Same 32.5 mg 32.5 mg 5 mg 2.5 mg 5 mg   6/14/2024 2.80 6/14/2024 2.0-3.0 Same 32.5 mg 32.5 mg 5 mg 2.5 mg 5 mg   5/31/2024 2.10 5/31/2024 2.0-3.0 Same 32.5 mg 32.5 mg 5 mg 2.5 mg 5 mg   5/20/2024 2.50  5/20/2024 2.0-3.0 Same 32.5 mg 32.5 mg 5 mg 2.5 mg 5 mg   5/10/2024 2.30 5/9/2024 2.0-3.0 Same 32.5 mg 32.5 mg 5 mg 2.5 mg 5 mg       Anticoagulation Monitoring Wed Thu Fri Sat   5/28/2025 5 mg 2.5 mg 5 mg 5 mg   4/28/2025 5 mg 2.5 mg 5 mg 5 mg   3/31/2025 5 mg 2.5 mg 5 mg 5 mg   3/10/2025 5 mg 2.5 mg 5 mg 5 mg   2/24/2025 5 mg 2.5 mg 5 mg 5 mg   2/10/2025 5 mg 2.5 mg 5 mg 5 mg   2/3/2025 5 mg 2.5 mg 5 mg 5 mg   1/20/2025 5 mg 2.5 mg 5 mg 5 mg   1/14/2025 5 mg 2.5 mg 5 mg 5 mg   1/7/2025 5 mg 5 mg 5 mg 5 mg   12/4/2024 5 mg 5 mg 5 mg 5 mg   11/6/2024 5 mg 5 mg 5 mg 5 mg   10/2/2024 5 mg 5 mg 5 mg 5 mg   9/4/2024 5 mg 5 mg 5 mg 5 mg   8/7/2024 5 mg 5 mg 5 mg 5 mg   7/10/2024 5 mg 5 mg 5 mg 5 mg   6/14/2024 5 mg 5 mg 5 mg 5 mg   5/31/2024 5 mg 5 mg 5 mg 5 mg   5/20/2024 5 mg 5 mg 5 mg 5 mg   5/10/2024 5 mg 5 mg 5 mg 5 mg         Phone communication conducted: [facility] [place]    INR IN RANGE- Continue current dose of warfarin and repeat INR in 4W.

## 2025-06-04 ENCOUNTER — ANESTHESIA EVENT (OUTPATIENT)
Dept: OPERATING ROOM | Facility: HOSPITAL | Age: 75
End: 2025-06-04
Payer: MEDICARE

## 2025-06-05 LAB
ALBUMIN SERPL-MCNC: 4.1 G/DL (ref 3.6–5.1)
ALP SERPL-CCNC: 53 U/L (ref 37–153)
ALT SERPL-CCNC: 15 U/L (ref 6–29)
ANION GAP SERPL CALCULATED.4IONS-SCNC: 10 MMOL/L (CALC) (ref 7–17)
AST SERPL-CCNC: 26 U/L (ref 10–35)
BASOPHILS # BLD AUTO: 43 CELLS/UL (ref 0–200)
BASOPHILS NFR BLD AUTO: 0.5 %
BILIRUB SERPL-MCNC: 1.2 MG/DL (ref 0.2–1.2)
BUN SERPL-MCNC: 16 MG/DL (ref 7–25)
CALCIUM SERPL-MCNC: 9.4 MG/DL (ref 8.6–10.4)
CHLORIDE SERPL-SCNC: 102 MMOL/L (ref 98–110)
CHOLEST SERPL-MCNC: 162 MG/DL
CHOLEST/HDLC SERPL: 2.8 (CALC)
CO2 SERPL-SCNC: 25 MMOL/L (ref 20–32)
CREAT SERPL-MCNC: 0.58 MG/DL (ref 0.6–1)
EGFRCR SERPLBLD CKD-EPI 2021: 95 ML/MIN/1.73M2
EOSINOPHIL # BLD AUTO: 120 CELLS/UL (ref 15–500)
EOSINOPHIL NFR BLD AUTO: 1.4 %
ERYTHROCYTE [DISTWIDTH] IN BLOOD BY AUTOMATED COUNT: 12.5 % (ref 11–15)
GLUCOSE SERPL-MCNC: 110 MG/DL (ref 65–99)
HCT VFR BLD AUTO: 45.3 % (ref 35–45)
HDLC SERPL-MCNC: 57 MG/DL
HGB BLD-MCNC: 14.7 G/DL (ref 11.7–15.5)
LDLC SERPL CALC-MCNC: 88 MG/DL (CALC)
LYMPHOCYTES # BLD AUTO: 2425 CELLS/UL (ref 850–3900)
LYMPHOCYTES NFR BLD AUTO: 28.2 %
MCH RBC QN AUTO: 30.8 PG (ref 27–33)
MCHC RBC AUTO-ENTMCNC: 32.5 G/DL (ref 32–36)
MCV RBC AUTO: 95 FL (ref 80–100)
MONOCYTES # BLD AUTO: 671 CELLS/UL (ref 200–950)
MONOCYTES NFR BLD AUTO: 7.8 %
NEUTROPHILS # BLD AUTO: 5341 CELLS/UL (ref 1500–7800)
NEUTROPHILS NFR BLD AUTO: 62.1 %
NONHDLC SERPL-MCNC: 105 MG/DL (CALC)
PLATELET # BLD AUTO: 268 THOUSAND/UL (ref 140–400)
PMV BLD REES-ECKER: 9.8 FL (ref 7.5–12.5)
POTASSIUM SERPL-SCNC: 4.6 MMOL/L (ref 3.5–5.3)
PROT SERPL-MCNC: 6.8 G/DL (ref 6.1–8.1)
RBC # BLD AUTO: 4.77 MILLION/UL (ref 3.8–5.1)
SODIUM SERPL-SCNC: 137 MMOL/L (ref 135–146)
TRIGL SERPL-MCNC: 78 MG/DL
WBC # BLD AUTO: 8.6 THOUSAND/UL (ref 3.8–10.8)

## 2025-06-07 ASSESSMENT — ENCOUNTER SYMPTOMS
COUGH: 0
DYSPNEA ON EXERTION: 0
IRREGULAR HEARTBEAT: 0
MEMORY LOSS: 0
SYNCOPE: 0
CONSTITUTIONAL NEGATIVE: 1
FOCAL WEAKNESS: 0
PALPITATIONS: 0
DEPRESSION: 0
RESPIRATORY NEGATIVE: 1
FALLS: 0
BLURRED VISION: 0
ORTHOPNEA: 0
SHORTNESS OF BREATH: 0
DECREASED APPETITE: 0
LIGHT-HEADEDNESS: 0

## 2025-06-07 NOTE — PROGRESS NOTES
Chief Complaint/Reason for Visit:   Presurgical cardiovascular risk assessment    History Of Present Illness:    Ms. Russell is coming today as a presurgical cardiovascular risk assessment.  We have followed this patient previously for ASHD, atrial fibrillation, mitral valve disease, hypertension, LV aneurysm, and dyslipidemia.  She had a CABG which included: LIMA to LAD, SVG to high lateral CX, SVG to circumflex, and SVG G to RCA in 2018.  During her surgery she also had a mitral valve repair, left atrial appendage ligation, and a maze procedure.    Patient comes in today telling me she has been in her usual state of health other than having issues with her gallbladder.  She has not had any recent hospital admissions.  She is taking her medication as prescribed.  Patient denies any chest pain, pressure, palpitations, orthopnea, or edema.  She denies any significant dyspnea.    Past Medical History:  She has a past medical history of Other long term (current) drug therapy and Personal history of other specified conditions.    Past Surgical History:  She has a past surgical history that includes Other surgical history (2019); Other surgical history (2018); Other surgical history (2018); Other surgical history (2018); and  section, classic (2018).      Social History:  She reports that she has never smoked. She has never used smokeless tobacco. She reports current alcohol use of about 1.0 standard drink of alcohol per week. She reports that she does not use drugs.    Family History:  Family History   Problem Relation Name Age of Onset    Hypertension Mother      Hyperlipidemia Mother      Hypertension Father      Hypercalcemia Father      Heart attack Mother's Sister      Heart attack Mother's Brother      Breast cancer Father's Sister      Heart attack Paternal Grandfather          Allergies:  Patient has no known allergies.    Medications:  Current Outpatient Medications  "  Medication Instructions    amLODIPine (NORVASC) 5 mg, oral, Daily    ascorbic acid (VITAMIN C ORAL) Take by mouth.    cholecalciferol (Vitamin D-3) 25 MCG (1000 UT) tablet 1 tablet, Daily    fish oil concentrate (Omega-3) 120-180 mg capsule 1 capsule, 2 times daily    lisinopril 20 mg, oral, 2 times daily    LORazepam (ATIVAN) 1 mg, oral, 2 times daily PRN    MAGNESIUM ORAL Daily    metoprolol tartrate (LOPRESSOR) 50 mg, oral, 2 times daily    nystatin (Mycostatin) 100,000 unit/gram powder 1 Application, Topical, 2 times daily    omeprazole (PRILOSEC) 40 mg, oral, Daily before breakfast, Do not crush or chew.    rosuvastatin (CRESTOR) 20 mg, oral, Daily    warfarin (COUMADIN) 5 mg, oral, Every evening, Or as directed by clinician       Review of Systems:  Review of Systems   Constitutional: Negative. Negative for decreased appetite and malaise/fatigue.   HENT: Negative.     Eyes:  Negative for blurred vision and visual disturbance.   Cardiovascular:  Negative for chest pain, dyspnea on exertion, irregular heartbeat, leg swelling, orthopnea, palpitations and syncope.   Respiratory: Negative.  Negative for cough and shortness of breath.    Musculoskeletal:  Negative for arthritis and falls.   Gastrointestinal:  Positive for bloating and nausea.        Needs alyson. Dr Mccracken   Neurological:  Negative for focal weakness and light-headedness.   Psychiatric/Behavioral:  Negative for depression and memory loss.         Anxious        Vitals  Visit Vitals  /86 (BP Location: Right arm)   Pulse 84   Ht 1.448 m (4' 9\")   Wt 69.9 kg (154 lb)   LMP  (LMP Unknown)   BMI 33.33 kg/m²   OB Status Postmenopausal   Smoking Status Never   BSA 1.68 m²        Physical Exam:  Physical Exam  Constitutional:       Appearance: Normal appearance.   HENT:      Head: Normocephalic.   Eyes:      Conjunctiva/sclera: Conjunctivae normal.   Cardiovascular:      Rate and Rhythm: Normal rate and regular rhythm.      Pulses: Normal pulses.      " "Heart sounds: S1 normal and S2 normal. No murmur heard.     No friction rub. No gallop.   Pulmonary:      Effort: Pulmonary effort is normal.      Breath sounds: Normal breath sounds.   Abdominal:      General: Bowel sounds are normal.      Palpations: Abdomen is soft.      Tenderness: There is no abdominal tenderness.   Musculoskeletal:      Cervical back: Neck supple.      Right lower leg: No edema.      Left lower leg: No edema.   Skin:     General: Skin is warm and dry.   Neurological:      General: No focal deficit present.      Mental Status: She is alert and oriented to person, place, and time.   Psychiatric:         Attention and Perception: Attention normal.      Comments: Anxious and \"nervous\" about having surgery           Last Labs:  CBC -  Lab Results   Component Value Date    WBC 8.6 06/04/2025    HGB 14.7 06/04/2025    HCT 45.3 (H) 06/04/2025    MCV 95.0 06/04/2025     06/04/2025     Lab Results   Component Value Date    GLUCOSE 110 (H) 06/04/2025    CALCIUM 9.4 06/04/2025     06/04/2025    K 4.6 06/04/2025    CO2 25 06/04/2025     06/04/2025    BUN 16 06/04/2025    CREATININE 0.58 (L) 06/04/2025      CMP -  Lab Results   Component Value Date    CALCIUM 9.4 06/04/2025    PHOS 3.5 12/27/2018    PROT 6.8 06/04/2025    ALBUMIN 4.1 06/04/2025    AST 26 06/04/2025    ALT 15 06/04/2025    ALKPHOS 53 06/04/2025    BILITOT 1.2 06/04/2025       LIPID PANEL -   Lab Results   Component Value Date    CHOL 162 06/04/2025    TRIG 78 06/04/2025    HDL 57 06/04/2025    CHHDL 2.8 06/04/2025    LDLF 72 05/22/2023    VLDL 20 02/27/2024    NHDL 105 06/04/2025       Lab Results   Component Value Date    HGBA1C 4.9 12/11/2018       Last Cardiology Tests:  EKG:  EKG done in the office today and personally reviewed shows sinus rhythm at 84 bpm, QT corrected interval 430 ms.  T wave abnormality noted.  Similar to previous EKGs.  This will to Dr. Stoddard for final review.    Echo:10-21-24  CONCLUSIONS:   1. " The left ventricular systolic function is mildly decreased, with a visually estimated ejection fraction of 45%.   2. Abnormal wall motion.   3. Left ventricular diastolic filling was indeterminate.   4. Upper septum 1.6 cm. Akinetic and aneurysmal basal and mid inferior wall.   5. There is moderately reduced right ventricular systolic function.   6. The left atrium is severely dilated.   7. Mean MV gradient is 9 mm Hg at HR of 77 bpm.   8. Mild aortic valve regurgitation.    Lab review: I have personally reviewed the laboratory result(s) 6-2025    Assessment/Plan:    Preoperative cardiovascular risk stratification  The patient states that they are being evaluated for cardiac risk prior to Lap Fauzia that is scheduled on 6-20-25 (Dr Mccracken)  The patient has history of: left ventricular dysfunction and ischemic cardiovascular disease  No known history of Ischemic cerebrovascular disease, Insulin-dependent diabetes mellitus , or Significant renal dysfunction with creatinine >2  The patient is not scheduled for a high risk surgery (intrathoracic; suprainguinal vascular or intraperitoneal)  RCRI score is 2  Patient can hold warfarin 5 days preprocedure or as directed by performing provider and restart as soon as felt safe to do so by the performing provider.    The patient understands that there is an increased risk of thromboembolism associated with temporary interruption of oral anticoagulation.  Recommend continuing beta blocker without interruption through perioperative period.  Overall, the patient is at increased risk for perioperative MACE.  Patient expressed understanding of the risk for perioperative cardiovascular complications.  Patient appears to be optimized from a cardiovascular standpoint and no cardiac work up is indicated.      ASHD: In 2018 patient had a CABG consisting of LIMA to LAD, SVG to high lateral LCx, SVG to CX, and SVG to RCA.  She is angina class 0.  Patient should continue on rosuvastatin,  metoprolol, fish oil, lisinopril, and amlodipine.  I encouraged her to be on a heart healthy low-sodium diet.  Recent lab work done this month shows a potassium of 4.6, BUN 16, creatinine 0.58.  LFTs were within normal limits.  Fasting lipid labs showed an LDL of 88, triglycerides 78.  Patient admits to some dietary indiscretion and will watch the animal fats in her diet.  Continue on rosuvastatin 20 mg nightly for now.    Valvular heart disease: In 2018 during her CABG, patient also had a mitral valve repair.  Echocardiogram last year showed mild AI.    Atrial fibrillation: Patient has a history of atrial fibrillation but has been maintaining sinus rhythm without the need for antiarrhythmic medication.  During her open heart surgery she had a left atrial appendage ligation as well as a maze procedure.  In 2021 she had an ablation.  Patient is currently anticoagulated due to an elevated [id number] score.  She is not having any abnormal bleeding or bruising.  Patient should continue on warfarin.  She gets INRs checked routinely.  Patient should also continue on metoprolol tartrate for rate control.  EKG today shows sinus rhythm.    Cardiomyopathy: Patient's most recent echocardiogram was done in 2024 showing an ejection fraction of 45%.  She has not had any signs of heart failure.  Patient will continue on lisinopril and metoprolol.  She does not require diuretics.    Hypertension: Blood pressure today is mildly elevated but she is very anxious about getting her surgery done.  She will continue on amlodipine 5 mg daily, lisinopril 20 mg twice daily, and metoprolol tartrate 50 mg twice daily.  I encouraged her to be on a heart healthy low-salt diet and to continue to follow home blood pressure readings.    Patient will follow-up with Dr. Stoddard in 6 months.  Patient instructed to call with any cardiovascular complaints. All questions were answered.       Dragon dictation was utilized to create this document. Quite  often unanticipated grammatical, syntax,  and other interpretive errors are inadvertently transcribed by the computer software.  Please disregard these errors.  Please excuse any errors that have escaped final proofreading.          Vi Santamaria, DEVON-CNP

## 2025-06-10 ENCOUNTER — TELEPHONE (OUTPATIENT)
Dept: PREADMISSION TESTING | Facility: HOSPITAL | Age: 75
End: 2025-06-10

## 2025-06-10 ENCOUNTER — OFFICE VISIT (OUTPATIENT)
Dept: CARDIOLOGY | Facility: HOSPITAL | Age: 75
End: 2025-06-10
Payer: MEDICARE

## 2025-06-10 VITALS
HEIGHT: 57 IN | WEIGHT: 154 LBS | DIASTOLIC BLOOD PRESSURE: 86 MMHG | HEART RATE: 84 BPM | BODY MASS INDEX: 33.22 KG/M2 | SYSTOLIC BLOOD PRESSURE: 138 MMHG

## 2025-06-10 DIAGNOSIS — I48.0 PAROXYSMAL ATRIAL FIBRILLATION (MULTI): ICD-10-CM

## 2025-06-10 DIAGNOSIS — I38 VALVULAR HEART DISEASE: ICD-10-CM

## 2025-06-10 DIAGNOSIS — I42.8 OTHER CARDIOMYOPATHY: ICD-10-CM

## 2025-06-10 DIAGNOSIS — I25.2 ARTERIOSCLEROSIS OF CORONARY ARTERY IN PATIENT WITH HISTORY OF MYOCARDIAL INFARCTION: Primary | ICD-10-CM

## 2025-06-10 DIAGNOSIS — I25.10 ARTERIOSCLEROSIS OF CORONARY ARTERY IN PATIENT WITH HISTORY OF MYOCARDIAL INFARCTION: Primary | ICD-10-CM

## 2025-06-10 DIAGNOSIS — I10 ESSENTIAL (PRIMARY) HYPERTENSION: ICD-10-CM

## 2025-06-10 DIAGNOSIS — Z01.818 PREOP EXAMINATION: ICD-10-CM

## 2025-06-10 LAB
ATRIAL RATE: 84 BPM
P AXIS: -26 DEGREES
P OFFSET: 175 MS
P ONSET: 128 MS
PR INTERVAL: 188 MS
Q ONSET: 222 MS
QRS COUNT: 14 BEATS
QRS DURATION: 100 MS
QT INTERVAL: 364 MS
QTC CALCULATION(BAZETT): 430 MS
QTC FREDERICIA: 407 MS
R AXIS: 97 DEGREES
T AXIS: -13 DEGREES
T OFFSET: 404 MS
VENTRICULAR RATE: 84 BPM

## 2025-06-10 PROCEDURE — 3008F BODY MASS INDEX DOCD: CPT | Performed by: NURSE PRACTITIONER

## 2025-06-10 PROCEDURE — 99213 OFFICE O/P EST LOW 20 MIN: CPT | Performed by: NURSE PRACTITIONER

## 2025-06-10 PROCEDURE — 1036F TOBACCO NON-USER: CPT | Performed by: NURSE PRACTITIONER

## 2025-06-10 PROCEDURE — 3075F SYST BP GE 130 - 139MM HG: CPT | Performed by: NURSE PRACTITIONER

## 2025-06-10 PROCEDURE — 93005 ELECTROCARDIOGRAM TRACING: CPT | Performed by: NURSE PRACTITIONER

## 2025-06-10 PROCEDURE — 99214 OFFICE O/P EST MOD 30 MIN: CPT | Performed by: NURSE PRACTITIONER

## 2025-06-10 PROCEDURE — 1160F RVW MEDS BY RX/DR IN RCRD: CPT | Performed by: NURSE PRACTITIONER

## 2025-06-10 PROCEDURE — 3079F DIAST BP 80-89 MM HG: CPT | Performed by: NURSE PRACTITIONER

## 2025-06-10 PROCEDURE — 1159F MED LIST DOCD IN RCRD: CPT | Performed by: NURSE PRACTITIONER

## 2025-06-10 ASSESSMENT — ENCOUNTER SYMPTOMS
OCCASIONAL FEELINGS OF UNSTEADINESS: 0
NAUSEA: 1
DEPRESSION: 0
BLOATING: 1
LOSS OF SENSATION IN FEET: 0

## 2025-06-10 NOTE — PATIENT INSTRUCTIONS
Continue on current meds  Heart healthy, low sodium diet  Mediterranean diet is recommended  Morning of surgery, take Metoprolol with a sip of water  OK to hold Warfarin 5 days prior to surgery   INR on 6-25-25   Follow up with Dr Stoddard in 6 months

## 2025-06-11 ENCOUNTER — APPOINTMENT (OUTPATIENT)
Dept: PRIMARY CARE | Facility: CLINIC | Age: 75
End: 2025-06-11
Payer: MEDICARE

## 2025-06-11 VITALS
HEART RATE: 124 BPM | SYSTOLIC BLOOD PRESSURE: 146 MMHG | TEMPERATURE: 96.8 F | DIASTOLIC BLOOD PRESSURE: 80 MMHG | WEIGHT: 153.6 LBS | OXYGEN SATURATION: 90 % | BODY MASS INDEX: 33.24 KG/M2

## 2025-06-11 DIAGNOSIS — I48.0 PAROXYSMAL ATRIAL FIBRILLATION (MULTI): ICD-10-CM

## 2025-06-11 DIAGNOSIS — Z95.1 S/P CABG X 4: ICD-10-CM

## 2025-06-11 DIAGNOSIS — Z01.818 PREOPERATIVE CLEARANCE: ICD-10-CM

## 2025-06-11 DIAGNOSIS — K80.20 CALCULUS OF GALLBLADDER WITHOUT CHOLECYSTITIS WITHOUT OBSTRUCTION: ICD-10-CM

## 2025-06-11 DIAGNOSIS — I10 BENIGN ESSENTIAL HYPERTENSION: ICD-10-CM

## 2025-06-11 DIAGNOSIS — F41.9 ANXIETY: Primary | ICD-10-CM

## 2025-06-11 DIAGNOSIS — Z79.899 MEDICATION MANAGEMENT: ICD-10-CM

## 2025-06-11 DIAGNOSIS — G47.33 OSA ON CPAP: ICD-10-CM

## 2025-06-11 DIAGNOSIS — E78.00 PURE HYPERCHOLESTEROLEMIA: ICD-10-CM

## 2025-06-11 LAB
POC AMPHETAMINE: NEGATIVE NG/ML
POC BARBITURATES: NEGATIVE NG/ML
POC BENZODIAZEPINES: POSITIVE NG/ML
POC BUPRENORPHINE URINE: NEGATIVE NG/ML
POC COCAINE: NEGATIVE NG/ML
POC MDMA (NG/ML) IN URINE: NEGATIVE NG/ML
POC METHADONE MANUALLY ENTERED: NEGATIVE NG/ML
POC METHAMPHETAMINE: NEGATIVE NG/ML
POC MORPHINE URINE: NEGATIVE NG/ML
POC OPIATES: ABNORMAL NG/ML
POC OXYCODONE: NEGATIVE NG/ML
POC PHENCYCLIDINE (PCP): NEGATIVE NG/ML
POC THC: POSITIVE NG/ML
POC TICYCLIC ANTIDEPRESSANTS (TCA): ABNORMAL NG/ML

## 2025-06-11 PROCEDURE — 3077F SYST BP >= 140 MM HG: CPT | Performed by: INTERNAL MEDICINE

## 2025-06-11 PROCEDURE — G2211 COMPLEX E/M VISIT ADD ON: HCPCS | Performed by: INTERNAL MEDICINE

## 2025-06-11 PROCEDURE — 80305 DRUG TEST PRSMV DIR OPT OBS: CPT | Performed by: INTERNAL MEDICINE

## 2025-06-11 PROCEDURE — 3079F DIAST BP 80-89 MM HG: CPT | Performed by: INTERNAL MEDICINE

## 2025-06-11 PROCEDURE — 99214 OFFICE O/P EST MOD 30 MIN: CPT | Performed by: INTERNAL MEDICINE

## 2025-06-11 PROCEDURE — 1159F MED LIST DOCD IN RCRD: CPT | Performed by: INTERNAL MEDICINE

## 2025-06-11 RX ORDER — LORAZEPAM 1 MG/1
1 TABLET ORAL 2 TIMES DAILY PRN
Qty: 60 TABLET | Refills: 2 | Status: SHIPPED | OUTPATIENT
Start: 2025-06-11 | End: 2025-09-09

## 2025-06-11 ASSESSMENT — ENCOUNTER SYMPTOMS
HEMATOLOGIC/LYMPHATIC NEGATIVE: 1
ALLERGIC/IMMUNOLOGIC NEGATIVE: 1
CARDIOVASCULAR NEGATIVE: 1
MUSCULOSKELETAL NEGATIVE: 1
PSYCHIATRIC NEGATIVE: 1
RESPIRATORY NEGATIVE: 1
GASTROINTESTINAL NEGATIVE: 1
EYES NEGATIVE: 1
NEUROLOGICAL NEGATIVE: 1
CONSTITUTIONAL NEGATIVE: 1
ENDOCRINE NEGATIVE: 1

## 2025-06-11 ASSESSMENT — PATIENT HEALTH QUESTIONNAIRE - PHQ9
SUM OF ALL RESPONSES TO PHQ9 QUESTIONS 1 AND 2: 1
2. FEELING DOWN, DEPRESSED OR HOPELESS: SEVERAL DAYS
1. LITTLE INTEREST OR PLEASURE IN DOING THINGS: NOT AT ALL

## 2025-06-11 NOTE — PROGRESS NOTES
Subjective   Patient ID: Sara Russell is a 75 y.o. female who presents for 3 month follow up.  Patient presents today in follow up re:   anxiety.    Anxiety symptoms have been overall well controlled with current medication therapy.  No adverse effects of medication reported.  No new or associated issues reported.    She continues routine follow up with Cardiology for all of her cardiac issues.        Review of Systems   Constitutional: Negative.    HENT: Negative.     Eyes: Negative.    Respiratory: Negative.     Cardiovascular: Negative.    Gastrointestinal: Negative.    Endocrine: Negative.    Genitourinary: Negative.    Musculoskeletal: Negative.    Skin: Negative.    Allergic/Immunologic: Negative.    Neurological: Negative.    Hematological: Negative.    Psychiatric/Behavioral: Negative.         Objective     Blood pressure 146/80, pulse (!) 124, temperature 36 °C (96.8 °F), temperature source Temporal, weight 69.7 kg (153 lb 9.6 oz), SpO2 90%.   Physical Exam  Constitutional:       Appearance: Normal appearance.   Neck:      Vascular: No carotid bruit.   Cardiovascular:      Rate and Rhythm: Normal rate and regular rhythm.      Pulses: Normal pulses.      Heart sounds: Normal heart sounds. No murmur heard.  Pulmonary:      Effort: Pulmonary effort is normal.      Breath sounds: Normal breath sounds. No wheezing or rales.   Abdominal:      General: Abdomen is flat. There is no distension.      Palpations: Abdomen is soft.      Tenderness: There is no abdominal tenderness.   Musculoskeletal:         General: Normal range of motion.      Cervical back: Normal range of motion and neck supple.   Skin:     General: Skin is warm and dry.   Neurological:      General: No focal deficit present.      Mental Status: She is alert and oriented to person, place, and time.   Psychiatric:         Mood and Affect: Mood normal.         Behavior: Behavior normal.         Assessment/Plan   Problem List Items Addressed  "This Visit       Anxiety - Primary    Relevant Medications    LORazepam (Ativan) 1 mg tablet    Other Relevant Orders    POCT waived urine drug screen manually resulted    Benign essential hypertension    Hyperlipidemia    APOLONIA on CPAP    S/P CABG x 4    Paroxysmal atrial fibrillation (Multi)     Other Visit Diagnoses         Medication management        Relevant Orders    POCT waived urine drug screen manually resulted      Preoperative clearance          Calculus of gallbladder without cholecystitis without obstruction                Anxiety symptoms well controlled and doing well on current therapy.  Will continue present therapy and follow clinically.  I have personally reviewed the OARRS report.  This report is scanned into the electronic medical record.  I have considered the risks of abuse, dependence, addiction and diversion.  I believe that it is clinically appropriate to prescribe this medication.  CSA reviewed/renewed today.  UDS reviewed today with expected findings, however, the urine does test positive for THC.  Patient is advised that this is not acceptable by federal law and the CSA.  She states that all she did was \"had a gummie.\"  She is advised against this or any other form of cannabis.  Will continue therapy for now, but she will need to test negative in the future.  She continues follow with Cardiology re:  heart issues and anticoagulation.  Cardiology continues to manage all these issues and she is doing well although I note that she has not had lab done in over a year.  I will therefore order lab for her.  Pt. continues to use CPAP nightly and is tolerating well with clinical benefit.  No morning headaches or daytime somnolence reported. Advised to continue treatment and will continue to follow clinically.    She is scheduled for lap cholecystectomy on 6/20 per  Dr. Mccracken and has been cleared by Cardiology with understanding of increased risk for perioperative MACE as well as pre and post-op " medication direction.  She is also cleared from my standpoint as well.      Follow up in 3 months

## 2025-06-20 ENCOUNTER — ANESTHESIA (OUTPATIENT)
Dept: OPERATING ROOM | Facility: HOSPITAL | Age: 75
End: 2025-06-20
Payer: MEDICARE

## 2025-06-20 ENCOUNTER — PHARMACY VISIT (OUTPATIENT)
Dept: PHARMACY | Facility: CLINIC | Age: 75
End: 2025-06-20
Payer: COMMERCIAL

## 2025-06-20 ENCOUNTER — HOSPITAL ENCOUNTER (OUTPATIENT)
Facility: HOSPITAL | Age: 75
Setting detail: OUTPATIENT SURGERY
Discharge: HOME | End: 2025-06-20
Attending: SURGERY | Admitting: SURGERY
Payer: MEDICARE

## 2025-06-20 ENCOUNTER — APPOINTMENT (OUTPATIENT)
Dept: RADIOLOGY | Facility: HOSPITAL | Age: 75
End: 2025-06-20
Payer: MEDICARE

## 2025-06-20 VITALS
HEART RATE: 66 BPM | WEIGHT: 153 LBS | RESPIRATION RATE: 16 BRPM | SYSTOLIC BLOOD PRESSURE: 118 MMHG | TEMPERATURE: 98 F | DIASTOLIC BLOOD PRESSURE: 78 MMHG | OXYGEN SATURATION: 97 % | HEIGHT: 57 IN | BODY MASS INDEX: 33.01 KG/M2

## 2025-06-20 DIAGNOSIS — K80.20 SYMPTOMATIC CHOLELITHIASIS: ICD-10-CM

## 2025-06-20 LAB
ANION GAP SERPL CALC-SCNC: 12 MMOL/L (ref 10–20)
BUN SERPL-MCNC: 16 MG/DL (ref 6–23)
CALCIUM SERPL-MCNC: 9.6 MG/DL (ref 8.6–10.3)
CHLORIDE SERPL-SCNC: 103 MMOL/L (ref 98–107)
CO2 SERPL-SCNC: 27 MMOL/L (ref 21–32)
CREAT SERPL-MCNC: 0.59 MG/DL (ref 0.5–1.05)
EGFRCR SERPLBLD CKD-EPI 2021: >90 ML/MIN/1.73M*2
GLUCOSE SERPL-MCNC: 114 MG/DL (ref 74–99)
POTASSIUM SERPL-SCNC: 4.2 MMOL/L (ref 3.5–5.3)
SODIUM SERPL-SCNC: 138 MMOL/L (ref 136–145)

## 2025-06-20 PROCEDURE — 3600000004 HC OR TIME - INITIAL BASE CHARGE - PROCEDURE LEVEL FOUR: Performed by: SURGERY

## 2025-06-20 PROCEDURE — 36415 COLL VENOUS BLD VENIPUNCTURE: CPT | Performed by: ANESTHESIOLOGY

## 2025-06-20 PROCEDURE — 2500000005 HC RX 250 GENERAL PHARMACY W/O HCPCS: Performed by: NURSE ANESTHETIST, CERTIFIED REGISTERED

## 2025-06-20 PROCEDURE — 7100000010 HC PHASE TWO TIME - EACH INCREMENTAL 1 MINUTE: Performed by: SURGERY

## 2025-06-20 PROCEDURE — 7100000009 HC PHASE TWO TIME - INITIAL BASE CHARGE: Performed by: SURGERY

## 2025-06-20 PROCEDURE — 74300 X-RAY BILE DUCTS/PANCREAS: CPT

## 2025-06-20 PROCEDURE — RXMED WILLOW AMBULATORY MEDICATION CHARGE

## 2025-06-20 PROCEDURE — 7100000002 HC RECOVERY ROOM TIME - EACH INCREMENTAL 1 MINUTE: Performed by: SURGERY

## 2025-06-20 PROCEDURE — 2720000007 HC OR 272 NO HCPCS: Performed by: SURGERY

## 2025-06-20 PROCEDURE — 2500000004 HC RX 250 GENERAL PHARMACY W/ HCPCS (ALT 636 FOR OP/ED): Performed by: ANESTHESIOLOGY

## 2025-06-20 PROCEDURE — C1729 CATH, DRAINAGE: HCPCS | Performed by: SURGERY

## 2025-06-20 PROCEDURE — 2500000004 HC RX 250 GENERAL PHARMACY W/ HCPCS (ALT 636 FOR OP/ED): Performed by: NURSE ANESTHETIST, CERTIFIED REGISTERED

## 2025-06-20 PROCEDURE — 2500000004 HC RX 250 GENERAL PHARMACY W/ HCPCS (ALT 636 FOR OP/ED)

## 2025-06-20 PROCEDURE — 2500000001 HC RX 250 WO HCPCS SELF ADMINISTERED DRUGS (ALT 637 FOR MEDICARE OP): Performed by: ANESTHESIOLOGY

## 2025-06-20 PROCEDURE — 88304 TISSUE EXAM BY PATHOLOGIST: CPT | Mod: TC,PORLAB | Performed by: SURGERY

## 2025-06-20 PROCEDURE — 3600000009 HC OR TIME - EACH INCREMENTAL 1 MINUTE - PROCEDURE LEVEL FOUR: Performed by: SURGERY

## 2025-06-20 PROCEDURE — 7100000001 HC RECOVERY ROOM TIME - INITIAL BASE CHARGE: Performed by: SURGERY

## 2025-06-20 PROCEDURE — 3700000001 HC GENERAL ANESTHESIA TIME - INITIAL BASE CHARGE: Performed by: SURGERY

## 2025-06-20 PROCEDURE — 2500000005 HC RX 250 GENERAL PHARMACY W/O HCPCS: Performed by: ANESTHESIOLOGY

## 2025-06-20 PROCEDURE — 88304 TISSUE EXAM BY PATHOLOGIST: CPT | Performed by: STUDENT IN AN ORGANIZED HEALTH CARE EDUCATION/TRAINING PROGRAM

## 2025-06-20 PROCEDURE — 80048 BASIC METABOLIC PNL TOTAL CA: CPT | Performed by: ANESTHESIOLOGY

## 2025-06-20 PROCEDURE — 3700000002 HC GENERAL ANESTHESIA TIME - EACH INCREMENTAL 1 MINUTE: Performed by: SURGERY

## 2025-06-20 RX ORDER — SODIUM CITRATE AND CITRIC ACID MONOHYDRATE 334; 500 MG/5ML; MG/5ML
30 SOLUTION ORAL ONCE
Status: COMPLETED | OUTPATIENT
Start: 2025-06-20 | End: 2025-06-20

## 2025-06-20 RX ORDER — NORETHINDRONE AND ETHINYL ESTRADIOL 0.5-0.035
KIT ORAL AS NEEDED
Status: DISCONTINUED | OUTPATIENT
Start: 2025-06-20 | End: 2025-06-20

## 2025-06-20 RX ORDER — PHENYLEPHRINE HCL IN 0.9% NACL 1 MG/10 ML
SYRINGE (ML) INTRAVENOUS AS NEEDED
Status: DISCONTINUED | OUTPATIENT
Start: 2025-06-20 | End: 2025-06-20

## 2025-06-20 RX ORDER — MORPHINE SULFATE 2 MG/ML
2 INJECTION, SOLUTION INTRAMUSCULAR; INTRAVENOUS EVERY 5 MIN PRN
Status: DISCONTINUED | OUTPATIENT
Start: 2025-06-20 | End: 2025-06-20 | Stop reason: HOSPADM

## 2025-06-20 RX ORDER — ONDANSETRON HYDROCHLORIDE 2 MG/ML
4 INJECTION, SOLUTION INTRAVENOUS ONCE
Status: COMPLETED | OUTPATIENT
Start: 2025-06-20 | End: 2025-06-20

## 2025-06-20 RX ORDER — FENTANYL CITRATE 50 UG/ML
INJECTION, SOLUTION INTRAMUSCULAR; INTRAVENOUS AS NEEDED
Status: DISCONTINUED | OUTPATIENT
Start: 2025-06-20 | End: 2025-06-20

## 2025-06-20 RX ORDER — MORPHINE SULFATE 4 MG/ML
4 INJECTION INTRAVENOUS EVERY 5 MIN PRN
Status: DISCONTINUED | OUTPATIENT
Start: 2025-06-20 | End: 2025-06-20 | Stop reason: HOSPADM

## 2025-06-20 RX ORDER — ROCURONIUM BROMIDE 10 MG/ML
INJECTION, SOLUTION INTRAVENOUS AS NEEDED
Status: DISCONTINUED | OUTPATIENT
Start: 2025-06-20 | End: 2025-06-20

## 2025-06-20 RX ORDER — PROPOFOL 10 MG/ML
INJECTION, EMULSION INTRAVENOUS AS NEEDED
Status: DISCONTINUED | OUTPATIENT
Start: 2025-06-20 | End: 2025-06-20

## 2025-06-20 RX ORDER — NITROGLYCERIN 20 MG/G
0.5 OINTMENT TOPICAL ONCE
Status: COMPLETED | OUTPATIENT
Start: 2025-06-20 | End: 2025-06-20

## 2025-06-20 RX ORDER — FAMOTIDINE 10 MG/ML
20 INJECTION, SOLUTION INTRAVENOUS ONCE
Status: COMPLETED | OUTPATIENT
Start: 2025-06-20 | End: 2025-06-20

## 2025-06-20 RX ORDER — METOCLOPRAMIDE HYDROCHLORIDE 5 MG/ML
10 INJECTION INTRAMUSCULAR; INTRAVENOUS ONCE
Status: COMPLETED | OUTPATIENT
Start: 2025-06-20 | End: 2025-06-20

## 2025-06-20 RX ORDER — ONDANSETRON HYDROCHLORIDE 2 MG/ML
4 INJECTION, SOLUTION INTRAVENOUS ONCE AS NEEDED
Status: DISCONTINUED | OUTPATIENT
Start: 2025-06-20 | End: 2025-06-20 | Stop reason: HOSPADM

## 2025-06-20 RX ORDER — ACETAMINOPHEN 325 MG/1
975 TABLET ORAL ONCE
Status: COMPLETED | OUTPATIENT
Start: 2025-06-20 | End: 2025-06-20

## 2025-06-20 RX ORDER — SODIUM CHLORIDE 9 MG/ML
100 INJECTION, SOLUTION INTRAVENOUS CONTINUOUS
Status: DISCONTINUED | OUTPATIENT
Start: 2025-06-20 | End: 2025-06-20 | Stop reason: HOSPADM

## 2025-06-20 RX ORDER — OXYCODONE HYDROCHLORIDE 5 MG/1
5 TABLET ORAL EVERY 6 HOURS PRN
Qty: 15 TABLET | Refills: 0 | Status: SHIPPED | OUTPATIENT
Start: 2025-06-20

## 2025-06-20 RX ORDER — CEFOXITIN SODIUM 2 G/50ML
2 INJECTION, SOLUTION INTRAVENOUS ONCE
Status: COMPLETED | OUTPATIENT
Start: 2025-06-20 | End: 2025-06-20

## 2025-06-20 RX ORDER — LIDOCAINE HYDROCHLORIDE 20 MG/ML
INJECTION, SOLUTION INFILTRATION; PERINEURAL AS NEEDED
Status: DISCONTINUED | OUTPATIENT
Start: 2025-06-20 | End: 2025-06-20

## 2025-06-20 RX ORDER — ONDANSETRON HYDROCHLORIDE 2 MG/ML
INJECTION, SOLUTION INTRAVENOUS AS NEEDED
Status: DISCONTINUED | OUTPATIENT
Start: 2025-06-20 | End: 2025-06-20

## 2025-06-20 RX ORDER — MIDAZOLAM HYDROCHLORIDE 1 MG/ML
INJECTION, SOLUTION INTRAMUSCULAR; INTRAVENOUS AS NEEDED
Status: DISCONTINUED | OUTPATIENT
Start: 2025-06-20 | End: 2025-06-20

## 2025-06-20 RX ORDER — POLYETHYLENE GLYCOL 3350 17 G/17G
17 POWDER, FOR SOLUTION ORAL DAILY
Qty: 10 PACKET | Refills: 0 | Status: SHIPPED | OUTPATIENT
Start: 2025-06-20

## 2025-06-20 RX ORDER — ALBUTEROL SULFATE 0.83 MG/ML
2.5 SOLUTION RESPIRATORY (INHALATION) ONCE
Status: DISCONTINUED | OUTPATIENT
Start: 2025-06-20 | End: 2025-06-20 | Stop reason: HOSPADM

## 2025-06-20 RX ADMIN — PROPOFOL 150 MG: 10 INJECTION, EMULSION INTRAVENOUS at 08:14

## 2025-06-20 RX ADMIN — FENTANYL CITRATE 25 MCG: 50 INJECTION INTRAMUSCULAR; INTRAVENOUS at 09:24

## 2025-06-20 RX ADMIN — Medication 3 L/MIN: at 07:21

## 2025-06-20 RX ADMIN — FAMOTIDINE 20 MG: 10 INJECTION, SOLUTION INTRAVENOUS at 07:22

## 2025-06-20 RX ADMIN — EPHEDRINE SULFATE 10 MG: 50 INJECTION, SOLUTION INTRAVENOUS at 08:21

## 2025-06-20 RX ADMIN — DEXAMETHASONE SODIUM PHOSPHATE 4 MG: 4 INJECTION, SOLUTION INTRAMUSCULAR; INTRAVENOUS at 08:55

## 2025-06-20 RX ADMIN — CEFOXITIN SODIUM 2 G: 2 INJECTION, SOLUTION INTRAVENOUS at 08:06

## 2025-06-20 RX ADMIN — Medication 200 MCG: at 08:19

## 2025-06-20 RX ADMIN — ONDANSETRON 4 MG: 2 INJECTION, SOLUTION INTRAMUSCULAR; INTRAVENOUS at 07:22

## 2025-06-20 RX ADMIN — LIDOCAINE HYDROCHLORIDE 100 MG: 20 INJECTION, SOLUTION INFILTRATION; PERINEURAL at 08:14

## 2025-06-20 RX ADMIN — ONDANSETRON 4 MG: 2 INJECTION INTRAMUSCULAR; INTRAVENOUS at 10:02

## 2025-06-20 RX ADMIN — FENTANYL CITRATE 50 MCG: 50 INJECTION INTRAMUSCULAR; INTRAVENOUS at 08:06

## 2025-06-20 RX ADMIN — SODIUM CITRATE AND CITRIC ACID MONOHYDRATE 30 ML: 500; 334 SOLUTION ORAL at 07:22

## 2025-06-20 RX ADMIN — FENTANYL CITRATE 25 MCG: 50 INJECTION INTRAMUSCULAR; INTRAVENOUS at 10:09

## 2025-06-20 RX ADMIN — METOCLOPRAMIDE 10 MG: 5 INJECTION, SOLUTION INTRAMUSCULAR; INTRAVENOUS at 07:22

## 2025-06-20 RX ADMIN — MIDAZOLAM 1 MG: 1 INJECTION INTRAMUSCULAR; INTRAVENOUS at 08:06

## 2025-06-20 RX ADMIN — NITROGLYCERIN 0.5 INCH: 20 OINTMENT TOPICAL at 07:22

## 2025-06-20 RX ADMIN — FENTANYL CITRATE 50 MCG: 50 INJECTION INTRAMUSCULAR; INTRAVENOUS at 09:01

## 2025-06-20 RX ADMIN — ROCURONIUM BROMIDE 40 MG: 10 INJECTION, SOLUTION INTRAVENOUS at 08:14

## 2025-06-20 RX ADMIN — ACETAMINOPHEN 975 MG: 325 TABLET ORAL at 07:22

## 2025-06-20 RX ADMIN — SODIUM CHLORIDE 100 ML/HR: 0.9 INJECTION, SOLUTION INTRAVENOUS at 07:22

## 2025-06-20 RX ADMIN — SUGAMMADEX 200 MG: 100 INJECTION, SOLUTION INTRAVENOUS at 10:08

## 2025-06-20 SDOH — HEALTH STABILITY: MENTAL HEALTH: CURRENT SMOKER: 0

## 2025-06-20 ASSESSMENT — ENCOUNTER SYMPTOMS
EYE DISCHARGE: 0
DIARRHEA: 0
HEADACHES: 0
VOMITING: 0
FREQUENCY: 0
ABDOMINAL PAIN: 0
EYE REDNESS: 0
CHILLS: 0
LIGHT-HEADEDNESS: 0
SHORTNESS OF BREATH: 0
ARTHRALGIAS: 0
DYSURIA: 0
COUGH: 0
SORE THROAT: 0
FEVER: 0
BACK PAIN: 0
COLOR CHANGE: 0
NAUSEA: 0
CONSTIPATION: 0

## 2025-06-20 ASSESSMENT — PAIN SCALES - GENERAL
PAINLEVEL_OUTOF10: 0 - NO PAIN
PAIN_LEVEL: 0
PAINLEVEL_OUTOF10: 0 - NO PAIN

## 2025-06-20 ASSESSMENT — COLUMBIA-SUICIDE SEVERITY RATING SCALE - C-SSRS
1. IN THE PAST MONTH, HAVE YOU WISHED YOU WERE DEAD OR WISHED YOU COULD GO TO SLEEP AND NOT WAKE UP?: NO
6. HAVE YOU EVER DONE ANYTHING, STARTED TO DO ANYTHING, OR PREPARED TO DO ANYTHING TO END YOUR LIFE?: NO
2. HAVE YOU ACTUALLY HAD ANY THOUGHTS OF KILLING YOURSELF?: NO

## 2025-06-20 ASSESSMENT — PAIN - FUNCTIONAL ASSESSMENT
PAIN_FUNCTIONAL_ASSESSMENT: 0-10

## 2025-06-20 NOTE — ANESTHESIA POSTPROCEDURE EVALUATION
Patient: Sara Russell    Procedure Summary       Date: 06/20/25 Room / Location: POR OR 02 / Virtual POR OR; [facility]    Anesthesia Start: 0806 Anesthesia Stop: 1030    Procedures:       CHOLECYSTECTOMY, LAPAROSCOPIC, WITH CHOLANGIOGRAM , reduction of diaphragmatic hernia (Abdomen)      FL GI CHOLANGIOGRAPHY INTRAOP S Diagnosis:       Symptomatic cholelithiasis      (Symptomatic cholelithiasis [K80.20])      (OR)    Scheduled Providers: Jenaro Mccracken MD Responsible Provider: TERESA Yeh    Anesthesia Type: general ASA Status: 3            Anesthesia Type: general    Vitals Value Taken Time   /61 06/20/25 11:20   Temp 36.7 °C (98 °F) 06/20/25 11:20   Pulse 66 06/20/25 11:20   Resp 11 06/20/25 11:20   SpO2 85 % 06/20/25 11:20       Anesthesia Post Evaluation    Patient location during evaluation: PACU  Patient participation: complete - patient participated  Level of consciousness: awake and alert  Pain score: 0  Pain management: adequate  Airway patency: patent  Cardiovascular status: hemodynamically stable  Respiratory status: room air  Hydration status: acceptable  Postoperative Nausea and Vomiting: none  Comments: PT. TRANSIENTLY REQUIRED HER HOME CPAP TO MAINTAIN SaO2 WHILE SEDATED ON PAIN MEDS.  INSTRUCTED TO UTILIZE HER CPAP AT HOME        There were no known notable events for this encounter.

## 2025-06-20 NOTE — ANESTHESIA PROCEDURE NOTES
Airway  Date/Time: 6/20/2025 8:16 AM  Reason: elective    Airway not difficult    Staffing  Performed: CRNA   Authorized by: DEVON Yeh-CRNA    Performed by: DEVON Yeh-PIA  Patient location during procedure: OR    Patient Condition  Indications for airway management: anesthesia  Patient position: sniffing  No planned trial extubation  Sedation level: deep     Final Airway Details   Preoxygenated: yes  Final airway type: endotracheal airway  Successful airway: ETT  Cuffed: yes   Successful intubation technique: video laryngoscopy  Adjuncts used in placement: intubating stylet  Blade: Sandra  Blade size: #3  ETT size (mm): 7.5  Cormack-Lehane Classification: grade I - full view of glottis  Placement verified by: chest auscultation and capnometry   Cuff volume (mL): 8  Measured from: teeth  ETT to teeth (cm): 21  Number of attempts at approach: 1  Number of other approaches attempted: 0    Additional Comments  Intubation without difficulty. Sargent videoscope utilized

## 2025-06-20 NOTE — H&P
History Of Present Illness  Sara Russell is a 75 y.o. female presenting for cholecystectomy.     Past Medical History  Medical History[1]    Surgical History  Surgical History[2]     Social History  She reports that she has never smoked. She has never used smokeless tobacco. She reports current alcohol use of about 1.0 standard drink of alcohol per week. She reports that she does not use drugs.    Family History  Family History[3]     Allergies  Patient has no known allergies.    Review of Systems   Constitutional:  Negative for chills and fever.   HENT:  Negative for congestion and sore throat.    Eyes:  Negative for discharge and redness.   Respiratory:  Negative for cough and shortness of breath.    Gastrointestinal:  Negative for abdominal pain, constipation, diarrhea, nausea and vomiting.   Endocrine: Negative for cold intolerance and heat intolerance.   Genitourinary:  Negative for dysuria and frequency.   Musculoskeletal:  Negative for arthralgias and back pain.   Skin:  Negative for color change and rash.   Neurological:  Negative for light-headedness and headaches.        Physical Exam  Constitutional:       General: She is not in acute distress.  HENT:      Head: Normocephalic and atraumatic.   Eyes:      Extraocular Movements: Extraocular movements intact.      Pupils: Pupils are equal, round, and reactive to light.   Cardiovascular:      Rate and Rhythm: Normal rate and regular rhythm.   Pulmonary:      Effort: Pulmonary effort is normal.      Breath sounds: Normal breath sounds.   Abdominal:      General: There is no distension.      Palpations: Abdomen is soft.      Tenderness: There is no abdominal tenderness. There is no guarding or rebound.   Musculoskeletal:         General: Normal range of motion.      Right lower leg: No edema.      Left lower leg: No edema.   Skin:     General: Skin is warm and dry.   Neurological:      General: No focal deficit present.      Mental Status: She is alert  "and oriented to person, place, and time.          Last Recorded Vitals  Blood pressure 149/87, pulse 82, temperature 36.7 °C (98 °F), temperature source Temporal, resp. rate 16, height 1.448 m (4' 9\"), weight 69.4 kg (153 lb), SpO2 98%.       Assessment & Plan      To OR for laparoscopic cholecystectomy with C    Volodymyr Jamil DO         [1]   Past Medical History:  Diagnosis Date    Other long term (current) drug therapy     Long term current use of amiodarone    Personal history of other specified conditions     History of palpitations   [2]   Past Surgical History:  Procedure Laterality Date     SECTION, CLASSIC  2018     Section    OTHER SURGICAL HISTORY  2019    Coronary artery bypass graft    OTHER SURGICAL HISTORY  2018    Maze procedure    OTHER SURGICAL HISTORY  2018    Mitral valve annuloplasty    OTHER SURGICAL HISTORY  2018    Exclusion of atrial appendage   [3]   Family History  Problem Relation Name Age of Onset    Hypertension Mother      Hyperlipidemia Mother      Hypertension Father      Hypercalcemia Father      Heart attack Mother's Sister      Heart attack Mother's Brother      Breast cancer Father's Sister      Heart attack Paternal Grandfather       "

## 2025-06-20 NOTE — OP NOTE
CHOLECYSTECTOMY, LAPAROSCOPIC, WITH CHOLANGIOGRAM POSSIBLE OPEN (C ARM) Operative Note     Date: 2025  OR Location: [facility]    Name: Sara Russell, : 1950, Age: 75 y.o., MRN: [id number], Sex: female    Diagnosis  Pre-op Diagnosis      * Symptomatic cholelithiasis [K80.20] Post-op Diagnosis     * Symptomatic cholelithiasis [K80.20] large anterior diaphragmatic hernia     Procedures  CHOLECYSTECTOMY, LAPAROSCOPIC, WITH CHOLANGIOGRAM POSSIBLE OPEN (C ARM)  53325 - MT LAPS SURG CHOLECYSTECTOMY W/CHOLANGIOGRAPHY  Reduction diaphragmatic hernia    Surgeons      * Jenaro Mccracken - Primary    Resident/Fellow/Other Assistant:  Surgeons and Role:  * No surgeons found with a matching role *  Fredy Jamil DO  Staff:   Circulator: Fernanda  Scrub Person: Maribeth Marroquinub Person: Dima  Surgical Assistant: Rosas Blakely Circulator: Silvia    Anesthesia Staff: CRNA: DEVON Yeh-CRNA    Procedure Summary  Anesthesia: General  ASA: III  Estimated Blood Loss: 25mL  Intra-op Medications:   Administrations occurring from 0745 to 1015 on 25:   Medication Name Total Dose   dexAMETHasone (Decadron) 4 mg/mL IV Syringe 2 mL 4 mg   ePHEDrine injection 10 mg   fentaNYL (Sublimaze) injection 50 mcg/mL 150 mcg   lidocaine (Xylocaine) injection 2 % 100 mg   midazolam (Versed) injection 1 mg/mL 1 mg   ondansetron (Zofran) 2 mg/mL injection 4 mg   phenylephrine 100 mcg/mL syringe 10 mL (prefilled) 200 mcg   propofol (Diprivan) injection 10 mg/mL 150 mg   rocuronium (ZeMuron) 50 mg/5 mL injection 40 mg   sugammadex (Bridion) 200 mg/2 mL injection 200 mg   cefOXitin (Mefoxin) 2 g in dextrose (iso) IV 50 mL 2 g              Anesthesia Record               Intraprocedure I/O Totals          Intake    cefOXitin (Mefoxin) 2 g in dextrose (iso) IV 50 mL 50.00 mL    Total Intake 50 mL          Specimen:   ID Type Source Tests Collected by Time   1 : gallbladder Tissue GALLBLADDER CHOLECYSTECTOMY SURGICAL PATHOLOGY EXAM Jenaro Mccracken MD  6/20/2025 0858                 Drains and/or Catheters:   Urethral Catheter Non-latex 16 Fr. (Active)       [REMOVED] NG/OG/Feeding Tube OG - Nolan sump 18 Fr Center mouth (Removed)       Tourniquet Times:         Implants:     Findings:       Indications: Sara Russell is an 75 y.o. female who is having surgery for Symptomatic cholelithiasis [K80.20].       The patient was seen in the preoperative area. The risks, benefits, complications, treatment options, non-operative alternatives, expected recovery and outcomes were discussed with the patient. The possibilities of reaction to medication, pulmonary aspiration, injury to surrounding structures, bleeding, recurrent infection, the need for additional procedures, failure to diagnose a condition, and creating a complication requiring transfusion or operation were discussed with the patient. The patient concurred with the proposed plan, giving informed consent.  The site of surgery was properly noted/marked if necessary per policy. The patient has been actively warmed in preoperative area. Preoperative antibiotics have been ordered and given within 1 hours of incision. Venous thrombosis prophylaxis have been ordered including bilateral sequential compression devices    Procedure Details: Patient presented to the outpatient office with signs and symptoms consistent with symptomatic cholelithiasis she was counseled and brought to the operating room for laparoscopic cholecystectomy.    Patient was brought to the operating room placed in the supine position placed under general endotracheal anesthesia.  Orogastric Gaming catheter PAS stockings were placed.  Supraumbilical vertical incision was made.  This was carried down through the subcutaneous tissues to the fascia which was grasped bilaterally elevated incised and a Gracie clamp was used to enter the peritoneum bluntly.  Bilateral stay sutures 0 Vicryl were placed.  De Guzman trocar was placed.  Abdomen was  insufflated to a pressure of 15 with CO2 gas.  Initial view showed the entire upper abdomen obscured by fat.  We could not see the the liver.  Interestingly the immediately posterior to the sternum there was a large hernial orifice with omentum going up into the space posterior to the sternum.  Upper 3 abdominal ports were placed.  Initially we placed the patient in head up position and teased the fat off the dome of the liver it was not adhesed were able to see the dome of the gallbladder with regard to the hernia there was a large anterior hernia allowing a large tongue of omentum to go into the thorax.  This was gently teased down.  Pictures were taken of before and after.  Once all fat was in its proper location, the gallbladder fundus was grasped and retracted anteriorly there were a large amount of adhesions to the wall of the gallbladder.  These were taken down using a combination of traction and cautery.  Garcia's pouch was grasped and retracted inferolaterally.  Fundus was retracted Aner superolaterally.Fat was taken down from its attachments to the gallbladder wall.  The grasper in the area of Garcia's pouch did create a rent in the gallbladder as the gallbladder was friable.  Calot's triangle was dissected the cystic artery was identified and dissected the cystic duct was identified however this area tore with the rent from the grasper and attempt was made to do cholangiogram this failed the area was irrigated, further dissection on the cystic duct allowed a cholangiogram to be performed a ductotomy was made cholangiocatheter was introduced clipped into place and a cholangiogram showed a cystic duct joining a common hepatic duct to form the common bile duct which drained spontaneously through a tapered ampulla into the duodenum bilateral hepatic radicles and an accessory right duct were also identified.  There were no filling defects.  Cholangiocatheter was removed the cystic duct was clipped twice on  the patient's side and transected cystic artery which had been already preemptively clipped due to a distal arterial bleed on the wall the gallbladder was further clipped and then transected.  Gallbladder was removed from the gallbladder bed using cautery.  When it rested on a thin veil of peritoneum the bed was viewed there was bleeding from a posterior artery which was clipped.  The bed was again viewed there was a small tear in the liver the liver capsule which was cauterized.  Further viewing revealed no further bleeding there was no bile leak all clips were in place the gallbladder was then removed placed in a bag and taken out of the patient.  Camera was replaced the upper abdomen was reviewed it was irrigated for clear return all clips were in place.  Preplaced Vicryl sutures were used to close the fascia at the supraumbilical port.  Skin wounds were closed with Vicryl.  She tolerated the procedure well was extubated and will be returned to the recovery room in satisfactory condition  Evidence of Infection: No   Complications:  None; patient tolerated the procedure well.    Disposition: PACU - hemodynamically stable.  Condition: stable         Task Performed by RNFA or Surgical Assistant:              Additional Details:       Attending Attestation: I was present for the entire procedure.    Jenaro Mccracken  Phone Number: [phone]

## 2025-06-20 NOTE — ANESTHESIA PREPROCEDURE EVALUATION
Patient: Sara Russell    Procedure Information       Date/Time: 06/20/25 0745    Procedure: CHOLECYSTECTOMY, LAPAROSCOPIC, WITH CHOLANGIOGRAM POSSIBLE OPEN (C ARM) (Abdomen) - 2 HOUR    Location: [facility] 02 / [facility]    Surgeons: Jenaro Mccracken MD            Relevant Problems   Anesthesia (within normal limits)      Cardiac   (+) Atrial fibrillation (Multi)   (+) Atypical atrial flutter   (+) Benign essential hypertension   (+) Chronic diastolic congestive heart failure   (+) Heart valve disease   (+) Hyperlipidemia   (+) Myxomatous mitral valve regurgitation   (+) Non-rheumatic tricuspid valve insufficiency   (+) Nonrheumatic mitral (valve) insufficiency   (+) Nonrheumatic mitral valve regurgitation   (+) Paroxysmal atrial fibrillation (Multi)   (+) Persistent atrial fibrillation (Multi)   (+) Valvular heart disease      Neuro   (+) Anxiety   (+) Sciatica   (+) Sciatica of left side      Endocrine   (+) Obesity      Hematology   (+) Anemia       Clinical information reviewed:   Tobacco  Allergies  Meds  Problems  Med Hx  Surg Hx  OB Status    Fam Hx          NPO Detail:  NPO/Void Status  Carbohydrate Drink Given Prior to Surgery? : N  Date of Last Liquid: 06/20/25  Time of Last Liquid: 0530  Date of Last Solid: 06/19/25  Time of Last Solid: 1400  Last Intake Type: Clear fluids  Time of Last Void: 0657         Physical Exam    Airway  Mallampati: III  TM distance: >3 FB  Neck ROM: full  Mouth opening: 3 or more finger widths     Cardiovascular - normal exam   Dental - normal exam     Pulmonary - normal exam   Abdominal - normal exam           Anesthesia Plan    History of general anesthesia?: yes  History of complications of general anesthesia?: no    ASA 3     general     The patient is not a current smoker.  Education provided regarding risk of obstructive sleep apnea.  intravenous induction   Anesthetic plan and risks discussed with patient.  Use of blood products discussed with patient who.     Plan discussed with attending.

## 2025-06-23 ASSESSMENT — PAIN SCALES - GENERAL: PAINLEVEL_OUTOF10: 3

## 2025-06-25 ENCOUNTER — ANTICOAGULATION - WARFARIN VISIT (OUTPATIENT)
Dept: CARDIOLOGY | Facility: HOSPITAL | Age: 75
End: 2025-06-25

## 2025-06-25 ENCOUNTER — CLINICAL SUPPORT (OUTPATIENT)
Dept: CARDIOLOGY | Facility: HOSPITAL | Age: 75
End: 2025-06-25
Payer: MEDICARE

## 2025-06-25 ENCOUNTER — APPOINTMENT (OUTPATIENT)
Dept: PRIMARY CARE | Facility: CLINIC | Age: 75
End: 2025-06-25
Payer: MEDICARE

## 2025-06-25 DIAGNOSIS — I48.91 ATRIAL FIBRILLATION, UNSPECIFIED TYPE (MULTI): ICD-10-CM

## 2025-06-25 LAB
POC INR: 1.4 (ref 2–3)
POC PROTHROMBIN TIME: ABNORMAL (ref 9.3–12.5)

## 2025-06-25 PROCEDURE — 85610 PROTHROMBIN TIME: CPT | Mod: QW | Performed by: INTERNAL MEDICINE

## 2025-06-25 PROCEDURE — 99211 OFF/OP EST MAY X REQ PHY/QHP: CPT

## 2025-06-25 PROCEDURE — 85610 PROTHROMBIN TIME: CPT | Performed by: INTERNAL MEDICINE

## 2025-06-25 PROCEDURE — 93793 ANTICOAG MGMT PT WARFARIN: CPT | Performed by: INTERNAL MEDICINE

## 2025-06-25 NOTE — PROGRESS NOTES
Anticoagulation Episode Summary       Current INR goal:  2.0-3.0   TTR:  81.2% (1.7 y)   Next INR check:  7/2/2025   INR from last check:  1.40 (6/25/2025)   Weekly max warfarin dose:  --   Target end date:  --   INR check location:  --   Preferred lab:  --   Send INR reminders to:  [facility]    Indications    Atrial fibrillation (Multi) [I48.91]             Comments:  --             Anticoagulation Care Providers       Provider Role Specialty Phone number    Vi ANGELINA Santamaria, APRN-CNP  Cardiology [phone]             More data exists     Anticoagulation Monitoring INR INR Date INR Goal Trend Last Week Total Next Week Total Sun Mon Tue 6/25/2025 1.40 6/25/2025 2.0-3.0 Up 30 mg 32.5 mg 5 mg 2.5 mg 5 mg   5/28/2025 2.60 5/28/2025 2.0-3.0 Same 30 mg 30 mg 5 mg 2.5 mg 5 mg   4/28/2025 2.70 4/28/2025 2.0-3.0 Same 30 mg 30 mg 5 mg 2.5 mg 5 mg   3/31/2025 2.00 3/31/2025 2.0-3.0 Same 30 mg 30 mg 5 mg 2.5 mg 5 mg   3/10/2025 2.20 3/10/2025 2.0-3.0 Same 30 mg 30 mg 5 mg 2.5 mg 5 mg   2/24/2025 2.70 2/24/2025 2.0-3.0 Same 30 mg 30 mg 5 mg 2.5 mg 5 mg   2/10/2025 2.90 2/10/2025 2.0-3.0 Same 30 mg 30 mg 5 mg 2.5 mg 5 mg   2/3/2025 2.70 2/3/2025 2.0-3.0 Same 30 mg 30 mg 5 mg 2.5 mg 5 mg   1/20/2025 2.90 1/20/2025 2.0-3.0 Same 30 mg 30 mg 5 mg 2.5 mg 5 mg   1/14/2025 3.40 1/14/2025 2.0-3.0 Down 32.5 mg 30 mg 5 mg 2.5 mg 5 mg   1/7/2025 3.10 1/7/2025 2.0-3.0 Same 32.5 mg 32.5 mg 5 mg 2.5 mg 5 mg   12/4/2024 2.60 12/4/2024 2.0-3.0 Same 32.5 mg 32.5 mg 5 mg 2.5 mg 5 mg   11/6/2024 2.50 11/6/2024 2.0-3.0 Same 32.5 mg 32.5 mg 5 mg 2.5 mg 5 mg   10/2/2024 2.60 10/2/2024 2.0-3.0 Same 32.5 mg 32.5 mg 5 mg 2.5 mg 5 mg   9/4/2024 2.60 9/4/2024 2.0-3.0 Same 32.5 mg 32.5 mg 5 mg 2.5 mg 5 mg   8/7/2024 2.80 8/7/2024 2.0-3.0 Same 32.5 mg 32.5 mg 5 mg 2.5 mg 5 mg   7/10/2024 2.70 7/10/2024 2.0-3.0 Same 32.5 mg 32.5 mg 5 mg 2.5 mg 5 mg   6/14/2024 2.80 6/14/2024 2.0-3.0 Same 32.5 mg 32.5 mg 5 mg 2.5 mg 5 mg   5/31/2024 2.10  5/31/2024 2.0-3.0 Same 32.5 mg 32.5 mg 5 mg 2.5 mg 5 mg   5/20/2024 2.50 5/20/2024 2.0-3.0 Same 32.5 mg 32.5 mg 5 mg 2.5 mg 5 mg       Anticoagulation Monitoring Wed Thu Fri Sat   6/25/2025 7.5 mg 2.5 mg 5 mg 5 mg   5/28/2025 5 mg 2.5 mg 5 mg 5 mg   4/28/2025 5 mg 2.5 mg 5 mg 5 mg   3/31/2025 5 mg 2.5 mg 5 mg 5 mg   3/10/2025 5 mg 2.5 mg 5 mg 5 mg   2/24/2025 5 mg 2.5 mg 5 mg 5 mg   2/10/2025 5 mg 2.5 mg 5 mg 5 mg   2/3/2025 5 mg 2.5 mg 5 mg 5 mg   1/20/2025 5 mg 2.5 mg 5 mg 5 mg   1/14/2025 5 mg 2.5 mg 5 mg 5 mg   1/7/2025 5 mg 5 mg 5 mg 5 mg   12/4/2024 5 mg 5 mg 5 mg 5 mg   11/6/2024 5 mg 5 mg 5 mg 5 mg   10/2/2024 5 mg 5 mg 5 mg 5 mg   9/4/2024 5 mg 5 mg 5 mg 5 mg   8/7/2024 5 mg 5 mg 5 mg 5 mg   7/10/2024 5 mg 5 mg 5 mg 5 mg   6/14/2024 5 mg 5 mg 5 mg 5 mg   5/31/2024 5 mg 5 mg 5 mg 5 mg   5/20/2024 5 mg 5 mg 5 mg 5 mg         Phone communication conducted: [facility] [place]    INR OUT OF RANGE- Change warfarin dose to 7.5 MG W, 2.5 MG M TH, 5 MG OTHERS and repeat INR in 1W week/s.

## 2025-07-02 ENCOUNTER — APPOINTMENT (OUTPATIENT)
Dept: CARDIOLOGY | Facility: HOSPITAL | Age: 75
End: 2025-07-02
Payer: MEDICARE

## 2025-07-02 ENCOUNTER — ANTICOAGULATION - WARFARIN VISIT (OUTPATIENT)
Dept: CARDIOLOGY | Facility: HOSPITAL | Age: 75
End: 2025-07-02
Payer: MEDICARE

## 2025-07-02 DIAGNOSIS — I48.91 ATRIAL FIBRILLATION, UNSPECIFIED TYPE (MULTI): ICD-10-CM

## 2025-07-02 LAB
LABORATORY COMMENT REPORT: NORMAL
PATH REPORT.FINAL DX SPEC: NORMAL
PATH REPORT.GROSS SPEC: NORMAL
PATH REPORT.RELEVANT HX SPEC: NORMAL
PATH REPORT.TOTAL CANCER: NORMAL
POC INR: 1.8 (ref 2–3)
POC PROTHROMBIN TIME: ABNORMAL (ref 9.3–12.5)
RESIDENT REVIEW: NORMAL

## 2025-07-02 PROCEDURE — 99211 OFF/OP EST MAY X REQ PHY/QHP: CPT

## 2025-07-02 PROCEDURE — 85610 PROTHROMBIN TIME: CPT | Mod: QW | Performed by: INTERNAL MEDICINE

## 2025-07-02 PROCEDURE — 85610 PROTHROMBIN TIME: CPT | Performed by: INTERNAL MEDICINE

## 2025-07-02 PROCEDURE — 93793 ANTICOAG MGMT PT WARFARIN: CPT | Performed by: INTERNAL MEDICINE

## 2025-07-02 NOTE — PROGRESS NOTES
Anticoagulation Episode Summary       Current INR goal:  2.0-3.0   TTR:  80.3% (1.7 y)   Next INR check:  7/9/2025   INR from last check:  1.80 (7/2/2025)   Weekly max warfarin dose:  --   Target end date:  --   INR check location:  --   Preferred lab:  --   Send INR reminders to:  [facility]    Indications    Atrial fibrillation (Multi) [I48.91]             Comments:  --             Anticoagulation Care Providers       Provider Role Specialty Phone number    Vi ALFARO Rosalio, APRN-CNP  Cardiology [phone]             More data exists     Anticoagulation Monitoring INR INR Date INR Goal Trend Last Week Total Next Week Total Sun Mon Tue 7/2/2025 1.80 7/2/2025 2.0-3.0 Up 32.5 mg 34 mg 4 mg 5 mg 5 mg   6/25/2025 1.40 6/25/2025 2.0-3.0 Up 30 mg 32.5 mg 5 mg 2.5 mg 5 mg   5/28/2025 2.60 5/28/2025 2.0-3.0 Same 30 mg 30 mg 5 mg 2.5 mg 5 mg   4/28/2025 2.70 4/28/2025 2.0-3.0 Same 30 mg 30 mg 5 mg 2.5 mg 5 mg   3/31/2025 2.00 3/31/2025 2.0-3.0 Same 30 mg 30 mg 5 mg 2.5 mg 5 mg   3/10/2025 2.20 3/10/2025 2.0-3.0 Same 30 mg 30 mg 5 mg 2.5 mg 5 mg   2/24/2025 2.70 2/24/2025 2.0-3.0 Same 30 mg 30 mg 5 mg 2.5 mg 5 mg   2/10/2025 2.90 2/10/2025 2.0-3.0 Same 30 mg 30 mg 5 mg 2.5 mg 5 mg   2/3/2025 2.70 2/3/2025 2.0-3.0 Same 30 mg 30 mg 5 mg 2.5 mg 5 mg   1/20/2025 2.90 1/20/2025 2.0-3.0 Same 30 mg 30 mg 5 mg 2.5 mg 5 mg   1/14/2025 3.40 1/14/2025 2.0-3.0 Down 32.5 mg 30 mg 5 mg 2.5 mg 5 mg   1/7/2025 3.10 1/7/2025 2.0-3.0 Same 32.5 mg 32.5 mg 5 mg 2.5 mg 5 mg   12/4/2024 2.60 12/4/2024 2.0-3.0 Same 32.5 mg 32.5 mg 5 mg 2.5 mg 5 mg   11/6/2024 2.50 11/6/2024 2.0-3.0 Same 32.5 mg 32.5 mg 5 mg 2.5 mg 5 mg   10/2/2024 2.60 10/2/2024 2.0-3.0 Same 32.5 mg 32.5 mg 5 mg 2.5 mg 5 mg   9/4/2024 2.60 9/4/2024 2.0-3.0 Same 32.5 mg 32.5 mg 5 mg 2.5 mg 5 mg   8/7/2024 2.80 8/7/2024 2.0-3.0 Same 32.5 mg 32.5 mg 5 mg 2.5 mg 5 mg   7/10/2024 2.70 7/10/2024 2.0-3.0 Same 32.5 mg 32.5 mg 5 mg 2.5 mg 5 mg   6/14/2024 2.80 6/14/2024  2.0-3.0 Same 32.5 mg 32.5 mg 5 mg 2.5 mg 5 mg   5/31/2024 2.10 5/31/2024 2.0-3.0 Same 32.5 mg 32.5 mg 5 mg 2.5 mg 5 mg       Anticoagulation Monitoring Wed Thu Fri Sat   7/2/2025 5 mg 5 mg 5 mg 5 mg   6/25/2025 7.5 mg 2.5 mg 5 mg 5 mg   5/28/2025 5 mg 2.5 mg 5 mg 5 mg   4/28/2025 5 mg 2.5 mg 5 mg 5 mg   3/31/2025 5 mg 2.5 mg 5 mg 5 mg   3/10/2025 5 mg 2.5 mg 5 mg 5 mg   2/24/2025 5 mg 2.5 mg 5 mg 5 mg   2/10/2025 5 mg 2.5 mg 5 mg 5 mg   2/3/2025 5 mg 2.5 mg 5 mg 5 mg   1/20/2025 5 mg 2.5 mg 5 mg 5 mg   1/14/2025 5 mg 2.5 mg 5 mg 5 mg   1/7/2025 5 mg 5 mg 5 mg 5 mg   12/4/2024 5 mg 5 mg 5 mg 5 mg   11/6/2024 5 mg 5 mg 5 mg 5 mg   10/2/2024 5 mg 5 mg 5 mg 5 mg   9/4/2024 5 mg 5 mg 5 mg 5 mg   8/7/2024 5 mg 5 mg 5 mg 5 mg   7/10/2024 5 mg 5 mg 5 mg 5 mg   6/14/2024 5 mg 5 mg 5 mg 5 mg   5/31/2024 5 mg 5 mg 5 mg 5 mg         Phone communication conducted: [facility] [place]    INR OUT OF RANGE- Change warfarin dose to 4 MG SUN, 5 MG OTHERS and repeat INR in 1W week/s.

## 2025-07-03 DIAGNOSIS — I48.91 ATRIAL FIBRILLATION, UNSPECIFIED TYPE (MULTI): Primary | ICD-10-CM

## 2025-07-03 RX ORDER — WARFARIN 2 MG/1
2 TABLET ORAL SEE ADMIN INSTRUCTIONS
Qty: 90 TABLET | Refills: 0 | Status: SHIPPED | OUTPATIENT
Start: 2025-07-03 | End: 2026-07-03

## 2025-07-09 ENCOUNTER — CLINICAL SUPPORT (OUTPATIENT)
Dept: CARDIOLOGY | Facility: HOSPITAL | Age: 75
End: 2025-07-09
Payer: MEDICARE

## 2025-07-09 ENCOUNTER — ANTICOAGULATION - WARFARIN VISIT (OUTPATIENT)
Dept: CARDIOLOGY | Facility: HOSPITAL | Age: 75
End: 2025-07-09

## 2025-07-09 DIAGNOSIS — I48.91 ATRIAL FIBRILLATION, UNSPECIFIED TYPE (MULTI): ICD-10-CM

## 2025-07-09 LAB
POC INR: 2.3 (ref 2–3)
POC PROTHROMBIN TIME: ABNORMAL (ref 9.3–12.5)

## 2025-07-09 PROCEDURE — 99211 OFF/OP EST MAY X REQ PHY/QHP: CPT

## 2025-07-09 PROCEDURE — 85610 PROTHROMBIN TIME: CPT | Mod: QW | Performed by: INTERNAL MEDICINE

## 2025-07-09 PROCEDURE — 93793 ANTICOAG MGMT PT WARFARIN: CPT | Performed by: INTERNAL MEDICINE

## 2025-07-09 PROCEDURE — 85610 PROTHROMBIN TIME: CPT | Performed by: INTERNAL MEDICINE

## 2025-07-09 NOTE — PROGRESS NOTES
Anticoagulation Episode Summary       Current INR goal:  2.0-3.0   TTR:  80.1% (1.7 y)   Next INR check:  7/16/2025   INR from last check:  2.30 (7/9/2025)   Weekly max warfarin dose:  --   Target end date:  --   INR check location:  --   Preferred lab:  --   Send INR reminders to:  [facility]    Indications    Atrial fibrillation (Multi) [I48.91]             Comments:  --             Anticoagulation Care Providers       Provider Role Specialty Phone number    Vi ALFARO Rosalio, APRN-CNP  Cardiology [phone]             More data exists     Anticoagulation Monitoring INR INR Date INR Goal Trend Last Week Total Next Week Total Sun Mon Tue 7/9/2025 2.30 7/9/2025 2.0-3.0 Same 34 mg 34 mg 4 mg 5 mg 5 mg   7/2/2025 1.80 7/2/2025 2.0-3.0 Up 32.5 mg 34 mg 4 mg 5 mg 5 mg   6/25/2025 1.40 6/25/2025 2.0-3.0 Up 30 mg 32.5 mg 5 mg 2.5 mg 5 mg   5/28/2025 2.60 5/28/2025 2.0-3.0 Same 30 mg 30 mg 5 mg 2.5 mg 5 mg   4/28/2025 2.70 4/28/2025 2.0-3.0 Same 30 mg 30 mg 5 mg 2.5 mg 5 mg   3/31/2025 2.00 3/31/2025 2.0-3.0 Same 30 mg 30 mg 5 mg 2.5 mg 5 mg   3/10/2025 2.20 3/10/2025 2.0-3.0 Same 30 mg 30 mg 5 mg 2.5 mg 5 mg   2/24/2025 2.70 2/24/2025 2.0-3.0 Same 30 mg 30 mg 5 mg 2.5 mg 5 mg   2/10/2025 2.90 2/10/2025 2.0-3.0 Same 30 mg 30 mg 5 mg 2.5 mg 5 mg   2/3/2025 2.70 2/3/2025 2.0-3.0 Same 30 mg 30 mg 5 mg 2.5 mg 5 mg   1/20/2025 2.90 1/20/2025 2.0-3.0 Same 30 mg 30 mg 5 mg 2.5 mg 5 mg   1/14/2025 3.40 1/14/2025 2.0-3.0 Down 32.5 mg 30 mg 5 mg 2.5 mg 5 mg   1/7/2025 3.10 1/7/2025 2.0-3.0 Same 32.5 mg 32.5 mg 5 mg 2.5 mg 5 mg   12/4/2024 2.60 12/4/2024 2.0-3.0 Same 32.5 mg 32.5 mg 5 mg 2.5 mg 5 mg   11/6/2024 2.50 11/6/2024 2.0-3.0 Same 32.5 mg 32.5 mg 5 mg 2.5 mg 5 mg   10/2/2024 2.60 10/2/2024 2.0-3.0 Same 32.5 mg 32.5 mg 5 mg 2.5 mg 5 mg   9/4/2024 2.60 9/4/2024 2.0-3.0 Same 32.5 mg 32.5 mg 5 mg 2.5 mg 5 mg   8/7/2024 2.80 8/7/2024 2.0-3.0 Same 32.5 mg 32.5 mg 5 mg 2.5 mg 5 mg   7/10/2024 2.70 7/10/2024 2.0-3.0  Same 32.5 mg 32.5 mg 5 mg 2.5 mg 5 mg   6/14/2024 2.80 6/14/2024 2.0-3.0 Same 32.5 mg 32.5 mg 5 mg 2.5 mg 5 mg       Anticoagulation Monitoring Wed Thu Fri Sat   7/9/2025 5 mg 5 mg 5 mg 5 mg   7/2/2025 5 mg 5 mg 5 mg 5 mg   6/25/2025 7.5 mg 2.5 mg 5 mg 5 mg   5/28/2025 5 mg 2.5 mg 5 mg 5 mg   4/28/2025 5 mg 2.5 mg 5 mg 5 mg   3/31/2025 5 mg 2.5 mg 5 mg 5 mg   3/10/2025 5 mg 2.5 mg 5 mg 5 mg   2/24/2025 5 mg 2.5 mg 5 mg 5 mg   2/10/2025 5 mg 2.5 mg 5 mg 5 mg   2/3/2025 5 mg 2.5 mg 5 mg 5 mg   1/20/2025 5 mg 2.5 mg 5 mg 5 mg   1/14/2025 5 mg 2.5 mg 5 mg 5 mg   1/7/2025 5 mg 5 mg 5 mg 5 mg   12/4/2024 5 mg 5 mg 5 mg 5 mg   11/6/2024 5 mg 5 mg 5 mg 5 mg   10/2/2024 5 mg 5 mg 5 mg 5 mg   9/4/2024 5 mg 5 mg 5 mg 5 mg   8/7/2024 5 mg 5 mg 5 mg 5 mg   7/10/2024 5 mg 5 mg 5 mg 5 mg   6/14/2024 5 mg 5 mg 5 mg 5 mg         Phone communication conducted: JORDYN MAURICE 07-09-25    INR IN RANGE- Continue current dose of warfarin and repeat INR in 1 week.

## 2025-07-16 ENCOUNTER — APPOINTMENT (OUTPATIENT)
Dept: CARDIOLOGY | Facility: HOSPITAL | Age: 75
End: 2025-07-16
Payer: MEDICARE

## 2025-07-16 ENCOUNTER — ANTICOAGULATION - WARFARIN VISIT (OUTPATIENT)
Dept: CARDIOLOGY | Facility: HOSPITAL | Age: 75
End: 2025-07-16
Payer: MEDICARE

## 2025-07-16 DIAGNOSIS — I48.91 ATRIAL FIBRILLATION, UNSPECIFIED TYPE (MULTI): ICD-10-CM

## 2025-07-16 LAB
POC INR: 1.7 (ref 2–3)
POC PROTHROMBIN TIME: ABNORMAL (ref 9.3–12.5)

## 2025-07-16 PROCEDURE — 85610 PROTHROMBIN TIME: CPT | Mod: QW | Performed by: INTERNAL MEDICINE

## 2025-07-16 PROCEDURE — 93793 ANTICOAG MGMT PT WARFARIN: CPT | Performed by: INTERNAL MEDICINE

## 2025-07-16 PROCEDURE — 99211 OFF/OP EST MAY X REQ PHY/QHP: CPT

## 2025-07-16 PROCEDURE — 85610 PROTHROMBIN TIME: CPT | Performed by: INTERNAL MEDICINE

## 2025-07-16 NOTE — PROGRESS NOTES
Anticoagulation Episode Summary       Current INR goal:  2.0-3.0   TTR:  79.7% (1.7 y)   Next INR check:  7/23/2025   INR from last check:  1.70 (7/16/2025)   Weekly max warfarin dose:  --   Target end date:  --   INR check location:  --   Preferred lab:  --   Send INR reminders to:  [facility]    Indications    Atrial fibrillation (Multi) [I48.91]             Comments:  --             Anticoagulation Care Providers       Provider Role Specialty Phone number    Vi ALFARO Rosalio, APRN-CNP  Cardiology [phone]             More data exists     Anticoagulation Monitoring INR INR Date INR Goal Trend Last Week Total Next Week Total Sun Mon Tue 7/16/2025 1.70 7/16/2025 2.0-3.0 Up 34 mg 36.5 mg 4 mg 5 mg 5 mg   7/9/2025 2.30 7/9/2025 2.0-3.0 Same 34 mg 34 mg 4 mg 5 mg 5 mg   7/2/2025 1.80 7/2/2025 2.0-3.0 Up 32.5 mg 34 mg 4 mg 5 mg 5 mg   6/25/2025 1.40 6/25/2025 2.0-3.0 Up 30 mg 32.5 mg 5 mg 2.5 mg 5 mg   5/28/2025 2.60 5/28/2025 2.0-3.0 Same 30 mg 30 mg 5 mg 2.5 mg 5 mg   4/28/2025 2.70 4/28/2025 2.0-3.0 Same 30 mg 30 mg 5 mg 2.5 mg 5 mg   3/31/2025 2.00 3/31/2025 2.0-3.0 Same 30 mg 30 mg 5 mg 2.5 mg 5 mg   3/10/2025 2.20 3/10/2025 2.0-3.0 Same 30 mg 30 mg 5 mg 2.5 mg 5 mg   2/24/2025 2.70 2/24/2025 2.0-3.0 Same 30 mg 30 mg 5 mg 2.5 mg 5 mg   2/10/2025 2.90 2/10/2025 2.0-3.0 Same 30 mg 30 mg 5 mg 2.5 mg 5 mg   2/3/2025 2.70 2/3/2025 2.0-3.0 Same 30 mg 30 mg 5 mg 2.5 mg 5 mg   1/20/2025 2.90 1/20/2025 2.0-3.0 Same 30 mg 30 mg 5 mg 2.5 mg 5 mg   1/14/2025 3.40 1/14/2025 2.0-3.0 Down 32.5 mg 30 mg 5 mg 2.5 mg 5 mg   1/7/2025 3.10 1/7/2025 2.0-3.0 Same 32.5 mg 32.5 mg 5 mg 2.5 mg 5 mg   12/4/2024 2.60 12/4/2024 2.0-3.0 Same 32.5 mg 32.5 mg 5 mg 2.5 mg 5 mg   11/6/2024 2.50 11/6/2024 2.0-3.0 Same 32.5 mg 32.5 mg 5 mg 2.5 mg 5 mg   10/2/2024 2.60 10/2/2024 2.0-3.0 Same 32.5 mg 32.5 mg 5 mg 2.5 mg 5 mg   9/4/2024 2.60 9/4/2024 2.0-3.0 Same 32.5 mg 32.5 mg 5 mg 2.5 mg 5 mg   8/7/2024 2.80 8/7/2024 2.0-3.0 Same  32.5 mg 32.5 mg 5 mg 2.5 mg 5 mg   7/10/2024 2.70 7/10/2024 2.0-3.0 Same 32.5 mg 32.5 mg 5 mg 2.5 mg 5 mg       Anticoagulation Monitoring Wed Thu Fri Sat   7/16/2025 7.5 mg 5 mg 5 mg 5 mg   7/9/2025 5 mg 5 mg 5 mg 5 mg   7/2/2025 5 mg 5 mg 5 mg 5 mg   6/25/2025 7.5 mg 2.5 mg 5 mg 5 mg   5/28/2025 5 mg 2.5 mg 5 mg 5 mg   4/28/2025 5 mg 2.5 mg 5 mg 5 mg   3/31/2025 5 mg 2.5 mg 5 mg 5 mg   3/10/2025 5 mg 2.5 mg 5 mg 5 mg   2/24/2025 5 mg 2.5 mg 5 mg 5 mg   2/10/2025 5 mg 2.5 mg 5 mg 5 mg   2/3/2025 5 mg 2.5 mg 5 mg 5 mg   1/20/2025 5 mg 2.5 mg 5 mg 5 mg   1/14/2025 5 mg 2.5 mg 5 mg 5 mg   1/7/2025 5 mg 5 mg 5 mg 5 mg   12/4/2024 5 mg 5 mg 5 mg 5 mg   11/6/2024 5 mg 5 mg 5 mg 5 mg   10/2/2024 5 mg 5 mg 5 mg 5 mg   9/4/2024 5 mg 5 mg 5 mg 5 mg   8/7/2024 5 mg 5 mg 5 mg 5 mg   7/10/2024 5 mg 5 mg 5 mg 5 mg         Phone communication conducted: [facility]    INR OUT OF RANGE- Change warfarin dose to 7.5 MG TODAY, 5 MG OTHERS and repeat INR in 1W week/s.

## 2025-07-17 DIAGNOSIS — I48.0 PAROXYSMAL ATRIAL FIBRILLATION (MULTI): Primary | ICD-10-CM

## 2025-07-23 ENCOUNTER — ANTICOAGULATION - WARFARIN VISIT (OUTPATIENT)
Dept: PHARMACY | Facility: HOSPITAL | Age: 75
End: 2025-07-23
Payer: MEDICARE

## 2025-07-23 ENCOUNTER — APPOINTMENT (OUTPATIENT)
Dept: CARDIOLOGY | Facility: HOSPITAL | Age: 75
End: 2025-07-23
Payer: MEDICARE

## 2025-07-23 DIAGNOSIS — I48.91 ATRIAL FIBRILLATION, UNSPECIFIED TYPE (MULTI): Primary | ICD-10-CM

## 2025-07-23 DIAGNOSIS — I48.0 PAROXYSMAL ATRIAL FIBRILLATION (MULTI): ICD-10-CM

## 2025-07-23 LAB
POC INR: 2.9 (ref 2–3)
POC PROTHROMBIN TIME: 35.3 (ref 9.3–12.5)

## 2025-07-23 PROCEDURE — 99211 OFF/OP EST MAY X REQ PHY/QHP: CPT

## 2025-07-23 PROCEDURE — 85610 PROTHROMBIN TIME: CPT | Mod: QW | Performed by: INTERNAL MEDICINE

## 2025-07-23 NOTE — PROGRESS NOTES
Sara Russell is a consult from Dr. Stoddard to our clinic for the management of her warfarin therapy due to her history of A-fib(multi) and paroxysmal atrial fibrillation (multi). She has 2 mg and 5 mg tablets at home. She had her gallbladder procedure last month and held her dose for 5 days. She reports her INRs have been off since the procedure. She tries to eat 3 small meals a day. She eats a salad every day, which does contain dark greens. She does not smoke or drink alcohol. No missed doses or extra doses of warfarin. No signs and symptoms of bleeding and bruising. Dr. Stoddard had boosted her last Wednesday with 7.5mg and then increased her regimen since her INRs were subtherapeutic. Given her INR is in range at 2.9, we will have her continue her weekly regimen of 5 mg daily. Follow up in 1 week.

## 2025-07-23 NOTE — PATIENT INSTRUCTIONS
Your INR is in range at 2.9. Please continue your weekly regimen of 5 mg daily. Follow up in 1 week.   If any questions arise do not hesitate to call us at [phone] M-F from 8:30am-4:30pm or at   [phone] after 5pm or on the weekends. Continue to monitor for any excess bleeding or bruising, especially for blood in your stool.

## 2025-07-30 ENCOUNTER — ANTICOAGULATION - WARFARIN VISIT (OUTPATIENT)
Dept: PHARMACY | Facility: HOSPITAL | Age: 75
End: 2025-07-30
Payer: MEDICARE

## 2025-07-30 DIAGNOSIS — I48.0 PAROXYSMAL ATRIAL FIBRILLATION (MULTI): Primary | ICD-10-CM

## 2025-07-30 LAB
POC INR: 2.7 (ref 2–3)
POC PROTHROMBIN TIME: 32 (ref 9.3–12.5)

## 2025-07-30 PROCEDURE — 99211 OFF/OP EST MAY X REQ PHY/QHP: CPT

## 2025-07-30 PROCEDURE — 85610 PROTHROMBIN TIME: CPT | Mod: QW | Performed by: INTERNAL MEDICINE

## 2025-07-30 NOTE — PROGRESS NOTES
Sara Drew is a consult from Dr. Stoddard to our clinic for the management of her warfarin therapy due to her history of A-fib(multi) and paroxysmal atrial fibrillation (multi). She has 2 mg and 5 mg tablets at home. She had her gallbladder procedure last month and held her dose for 5 days during that time. She reports her INRs have been off since the procedure. She tries to eat 3 small meals a day. She eats a salad every day, which does contain dark greens. She does not smoke or drink alcohol. No missed doses or extra doses of warfarin. No signs and symptoms of bleeding and bruising. She has been taking 5mg daily. Given her INR is in range at 2.7, we will have her continue her weekly regimen of 5 mg daily. We went over foods that contain vitamin K and the patient was given informative packets on what affects the INR. Follow up in 2 weeks.   no

## 2025-07-30 NOTE — PATIENT INSTRUCTIONS
Your INR is in range at 2.7. Please continue your weekly regimen of 5 mg daily. Follow up in 2 weeks.     If any questions arise do not hesitate to call us at [phone] M-F from 8:30am-4:30pm or at   [phone] after 5pm or on the weekends. Continue to monitor for any excess bleeding or bruising, especially for blood in your stool.

## 2025-08-13 ENCOUNTER — APPOINTMENT (OUTPATIENT)
Dept: PHARMACY | Facility: HOSPITAL | Age: 75
End: 2025-08-13
Payer: MEDICARE

## 2025-08-18 ENCOUNTER — ANTICOAGULATION - WARFARIN VISIT (OUTPATIENT)
Dept: PHARMACY | Facility: HOSPITAL | Age: 75
End: 2025-08-18
Payer: MEDICARE

## 2025-08-18 DIAGNOSIS — I48.0 PAROXYSMAL ATRIAL FIBRILLATION (MULTI): ICD-10-CM

## 2025-08-18 DIAGNOSIS — I48.91 ATRIAL FIBRILLATION, UNSPECIFIED TYPE (MULTI): Primary | ICD-10-CM

## 2025-08-18 DIAGNOSIS — I48.91 ATRIAL FIBRILLATION, UNSPECIFIED TYPE (MULTI): ICD-10-CM

## 2025-08-18 LAB
POC INR: 3.7 (ref 2–3)
POC PROTHROMBIN TIME: 44.4 (ref 9.3–12.5)

## 2025-08-18 PROCEDURE — 85610 PROTHROMBIN TIME: CPT | Mod: QW | Performed by: INTERNAL MEDICINE

## 2025-08-18 PROCEDURE — 99211 OFF/OP EST MAY X REQ PHY/QHP: CPT

## 2025-08-29 ENCOUNTER — ANTICOAGULATION - WARFARIN VISIT (OUTPATIENT)
Dept: PHARMACY | Facility: HOSPITAL | Age: 75
End: 2025-08-29
Payer: MEDICARE

## 2025-08-29 DIAGNOSIS — I48.0 PAROXYSMAL ATRIAL FIBRILLATION (MULTI): ICD-10-CM

## 2025-08-29 DIAGNOSIS — I48.91 ATRIAL FIBRILLATION, UNSPECIFIED TYPE (MULTI): Primary | ICD-10-CM

## 2025-08-29 LAB
POC INR: 2.7 (ref 2–3)
POC PROTHROMBIN TIME: 32.8 (ref 9.3–12.5)

## 2025-08-29 PROCEDURE — 85610 PROTHROMBIN TIME: CPT | Mod: QW | Performed by: INTERNAL MEDICINE

## 2025-08-29 PROCEDURE — 99211 OFF/OP EST MAY X REQ PHY/QHP: CPT

## 2025-09-09 ENCOUNTER — APPOINTMENT (OUTPATIENT)
Dept: PRIMARY CARE | Facility: CLINIC | Age: 75
End: 2025-09-09
Payer: MEDICARE

## (undated) DEVICE — TROCAR, BLUNT TIP, KII, W/SEAL, 12MM X 100MM

## (undated) DEVICE — SYRINGE, 30 CC, LUER LOCK

## (undated) DEVICE — PAD, GROUNDING, ELECTROSURGICAL, W/9 FT CABLE, POLYHESIVE II, ADULT, LF

## (undated) DEVICE — Device

## (undated) DEVICE — APPLIER,  LIGACLIP, ENDO ROTATE, ROTATING, 10MM 20M/L, DISP

## (undated) DEVICE — ASSEMBLY, STRYKER FLOW 2, SUCTION IRRIGATOR, WITH TIP

## (undated) DEVICE — TROCAR, KII OPTICAL BLADELESS 5MM Z THREAD 100MM LNGTH

## (undated) DEVICE — DRESSING, GAUZE, SPONGE, 8 PLY, CURITY, 2 X 2 IN, STERILE

## (undated) DEVICE — SLEEVE, KII, Z-THREAD, 5X100CM

## (undated) DEVICE — CABLE, ELECTROSURGICAL, MONOPOLAR, LAPAROSCOPIC, 10 FT, LF

## (undated) DEVICE — SUTURE, VICRYL, 4-0, 18 IN, PS2, UNDYED

## (undated) DEVICE — CATHETER, CHOLANGIOGRAM, 4.5 FR, 45 CM, 18 IN

## (undated) DEVICE — GLOVE, PROTEXIS PI CLASSIC, SZ-7.5, PF, LF

## (undated) DEVICE — DRAPE, C-ARM IMAGE

## (undated) DEVICE — PREP TRAY, SKIN, DRY, W/GLOVES

## (undated) DEVICE — SOLUTION, IRRIGATION, SODIUM CHLORIDE 0.9%, 1000 ML, POUR BOTTLE

## (undated) DEVICE — RETRIEVAL SYSTEM, MONARCH, 10MM DISP ENDOSCOPIC

## (undated) DEVICE — COVER HANDLE LIGHT, STERIS, BLUE, STERILE

## (undated) DEVICE — COVER, PLASTIC, MAYO STAND, 29.5IN X 55.5IN

## (undated) DEVICE — SUTURE, VICRYL, 0, 27 IN, CT-2, UNDYED

## (undated) DEVICE — PREP, SCRUB, SKIN, FOAM, HIBICLENS, 4 OZ